# Patient Record
Sex: FEMALE | Race: WHITE | Employment: OTHER | URBAN - METROPOLITAN AREA
[De-identification: names, ages, dates, MRNs, and addresses within clinical notes are randomized per-mention and may not be internally consistent; named-entity substitution may affect disease eponyms.]

---

## 2017-01-03 DIAGNOSIS — Z79.899 ENCOUNTER FOR LONG-TERM (CURRENT) USE OF HIGH-RISK MEDICATION: ICD-10-CM

## 2017-01-03 LAB
BASOPHILS # BLD AUTO: 0 10E9/L (ref 0–0.2)
BASOPHILS NFR BLD AUTO: 0.3 %
DIFFERENTIAL METHOD BLD: ABNORMAL
EOSINOPHIL # BLD AUTO: 0.2 10E9/L (ref 0–0.7)
EOSINOPHIL NFR BLD AUTO: 1.2 %
ERYTHROCYTE [DISTWIDTH] IN BLOOD BY AUTOMATED COUNT: 14.1 % (ref 10–15)
HCT VFR BLD AUTO: 37.8 % (ref 35–47)
HGB BLD-MCNC: 12.4 G/DL (ref 11.7–15.7)
LYMPHOCYTES # BLD AUTO: 2.1 10E9/L (ref 0.8–5.3)
LYMPHOCYTES NFR BLD AUTO: 17 %
MCH RBC QN AUTO: 32.7 PG (ref 26.5–33)
MCHC RBC AUTO-ENTMCNC: 32.8 G/DL (ref 31.5–36.5)
MCV RBC AUTO: 100 FL (ref 78–100)
MONOCYTES # BLD AUTO: 0.8 10E9/L (ref 0–1.3)
MONOCYTES NFR BLD AUTO: 6.4 %
NEUTROPHILS # BLD AUTO: 9.3 10E9/L (ref 1.6–8.3)
NEUTROPHILS NFR BLD AUTO: 75.1 %
PLATELET # BLD AUTO: 412 10E9/L (ref 150–450)
RBC # BLD AUTO: 3.79 10E12/L (ref 3.8–5.2)
WBC # BLD AUTO: 12.3 10E9/L (ref 4–11)

## 2017-01-03 PROCEDURE — 80159 DRUG ASSAY CLOZAPINE: CPT | Mod: 90 | Performed by: FAMILY MEDICINE

## 2017-01-03 PROCEDURE — 36415 COLL VENOUS BLD VENIPUNCTURE: CPT | Performed by: FAMILY MEDICINE

## 2017-01-03 PROCEDURE — 85025 COMPLETE CBC W/AUTO DIFF WBC: CPT | Performed by: FAMILY MEDICINE

## 2017-01-04 DIAGNOSIS — E61.1 IRON DEFICIENCY: Primary | ICD-10-CM

## 2017-01-04 RX ORDER — FERROUS SULFATE 325(65) MG
1 TABLET ORAL 2 TIMES DAILY
Qty: 60 TABLET | Refills: 11 | Status: SHIPPED | OUTPATIENT
Start: 2017-01-04 | End: 2017-11-22

## 2017-01-05 LAB
CLOZAPINE SERPL-MCNC: 769 NG/ML
CLOZAPINE+NOR SERPL-MCNC: 1224 UG/ML
NORCLOZAPINE SERPL-MCNC: 455 NG/ML

## 2017-01-16 ENCOUNTER — HOSPITAL ENCOUNTER (OUTPATIENT)
Dept: RESPIRATORY THERAPY | Facility: CLINIC | Age: 47
Discharge: HOME | End: 2017-01-16
Attending: NURSE PRACTITIONER | Admitting: NURSE PRACTITIONER
Payer: MEDICARE

## 2017-01-16 DIAGNOSIS — J45.20 ASTHMA, INTERMITTENT, UNCOMPLICATED: Chronic | ICD-10-CM

## 2017-01-16 PROCEDURE — 94726 PLETHYSMOGRAPHY LUNG VOLUMES: CPT

## 2017-01-16 PROCEDURE — 40000275 ZZH STATISTIC RCP TIME EA 10 MIN

## 2017-01-16 PROCEDURE — 94726 PLETHYSMOGRAPHY LUNG VOLUMES: CPT | Mod: 26 | Performed by: INTERNAL MEDICINE

## 2017-01-16 PROCEDURE — 94060 EVALUATION OF WHEEZING: CPT

## 2017-01-16 PROCEDURE — 94060 EVALUATION OF WHEEZING: CPT | Mod: 26 | Performed by: INTERNAL MEDICINE

## 2017-01-16 NOTE — LETTER
[facility] RESPIRATORY THERAPY  [address] [address]  Phone: [phone]  Fax: [phone]    January 18, 2017        Doreen Gramajo  [address] [facility] [address]          Dear Doreen,          The results of your recent Pulmonary Function tests were:  unable to be fully completed. No restriction noted.   I'm going to refer you to allergy/asthma for review of asthma. Please schedule appointment: in [place] [phone].    If you have any further questions or problems, please contact our office.      Sincerely,        Josey Bonilla CNP/ oneyda

## 2017-01-17 ENCOUNTER — ALLIED HEALTH/NURSE VISIT (OUTPATIENT)
Dept: FAMILY MEDICINE | Facility: CLINIC | Age: 47
End: 2017-01-17
Payer: MEDICARE

## 2017-01-17 DIAGNOSIS — F31.9 BIPOLAR AFFECTIVE DISORDER, REMISSION STATUS UNSPECIFIED (H): Primary | Chronic | ICD-10-CM

## 2017-01-17 DIAGNOSIS — Z79.899 ENCOUNTER FOR LONG-TERM (CURRENT) USE OF HIGH-RISK MEDICATION: ICD-10-CM

## 2017-01-17 DIAGNOSIS — F20.9 SCHIZOPHRENIA, UNSPECIFIED TYPE (H): ICD-10-CM

## 2017-01-17 LAB
BASOPHILS # BLD AUTO: 0 10E9/L (ref 0–0.2)
BASOPHILS NFR BLD AUTO: 0.3 %
DIFFERENTIAL METHOD BLD: ABNORMAL
EOSINOPHIL # BLD AUTO: 0.2 10E9/L (ref 0–0.7)
EOSINOPHIL NFR BLD AUTO: 1.3 %
ERYTHROCYTE [DISTWIDTH] IN BLOOD BY AUTOMATED COUNT: 14.4 % (ref 10–15)
HCT VFR BLD AUTO: 37.1 % (ref 35–47)
HGB BLD-MCNC: 12.1 G/DL (ref 11.7–15.7)
LYMPHOCYTES # BLD AUTO: 1.5 10E9/L (ref 0.8–5.3)
LYMPHOCYTES NFR BLD AUTO: 13.3 %
MCH RBC QN AUTO: 32.7 PG (ref 26.5–33)
MCHC RBC AUTO-ENTMCNC: 32.6 G/DL (ref 31.5–36.5)
MCV RBC AUTO: 100 FL (ref 78–100)
MONOCYTES # BLD AUTO: 0.6 10E9/L (ref 0–1.3)
MONOCYTES NFR BLD AUTO: 5.4 %
NEUTROPHILS # BLD AUTO: 9.2 10E9/L (ref 1.6–8.3)
NEUTROPHILS NFR BLD AUTO: 79.7 %
PLATELET # BLD AUTO: 415 10E9/L (ref 150–450)
RBC # BLD AUTO: 3.7 10E12/L (ref 3.8–5.2)
WBC # BLD AUTO: 11.6 10E9/L (ref 4–11)

## 2017-01-17 PROCEDURE — 96372 THER/PROPH/DIAG INJ SC/IM: CPT

## 2017-01-17 PROCEDURE — 99207 ZZC NO CHARGE NURSE ONLY: CPT

## 2017-01-17 PROCEDURE — 36415 COLL VENOUS BLD VENIPUNCTURE: CPT | Performed by: FAMILY MEDICINE

## 2017-01-17 PROCEDURE — 85025 COMPLETE CBC W/AUTO DIFF WBC: CPT | Performed by: FAMILY MEDICINE

## 2017-01-17 PROCEDURE — 80159 DRUG ASSAY CLOZAPINE: CPT | Mod: 90 | Performed by: FAMILY MEDICINE

## 2017-01-18 DIAGNOSIS — R41.83 BORDERLINE INTELLECTUAL FUNCTIONING: ICD-10-CM

## 2017-01-18 DIAGNOSIS — F31.9 BIPOLAR AFFECTIVE DISORDER, REMISSION STATUS UNSPECIFIED (H): Chronic | ICD-10-CM

## 2017-01-18 DIAGNOSIS — F20.9 SCHIZOPHRENIA, UNSPECIFIED TYPE (H): Chronic | ICD-10-CM

## 2017-01-18 DIAGNOSIS — F17.219 CIGARETTE NICOTINE DEPENDENCE WITH NICOTINE-INDUCED DISORDER: ICD-10-CM

## 2017-01-18 DIAGNOSIS — Z78.9 LIVES IN GROUP HOME: ICD-10-CM

## 2017-01-18 DIAGNOSIS — J45.20 ASTHMA, INTERMITTENT, UNCOMPLICATED: Primary | Chronic | ICD-10-CM

## 2017-01-18 LAB
CLOZAPINE SERPL-MCNC: 563 NG/ML
CLOZAPINE+NOR SERPL-MCNC: 858 UG/ML
ERV-%PRED-PRE: 111 %
ERV-PRE: 0.83 L
ERV-PRED: 0.75 L
EXPTIME-PRE: 9.48 SEC
FEF2575-%PRED-POST: 139 %
FEF2575-%PRED-PRE: 117 %
FEF2575-POST: 3.86 L/SEC
FEF2575-PRE: 3.25 L/SEC
FEF2575-PRED: 2.78 L/SEC
FEFMAX-%PRED-PRE: 87 %
FEFMAX-PRE: 5.7 L/SEC
FEFMAX-PRED: 6.54 L/SEC
FEV1-%PRED-PRE: 103 %
FEV1-PRE: 2.77 L
FEV1FEV6-PRE: 89 %
FEV1FEV6-PRED: 83 %
FEV1FVC-PRE: 89 %
FEV1FVC-PRED: 81 %
FEV1SVC-PRE: 83 %
FEV1SVC-PRED: 81 %
FIFMAX-PRE: 2.67 L/SEC
FRCPLETH-%PRED-PRE: 89 %
FRCPLETH-PRE: 2.3 L
FRCPLETH-PRED: 2.57 L
FVC-%PRED-PRE: 94 %
FVC-PRE: 3.13 L
FVC-PRED: 3.3 L
IC-%PRED-PRE: 94 %
IC-PRE: 2.42 L
IC-PRED: 2.56 L
NORCLOZAPINE SERPL-MCNC: 295 NG/ML
RVPLETH-%PRED-PRE: 87 %
RVPLETH-PRE: 1.39 L
RVPLETH-PRED: 1.59 L
TLCPLETH-%PRED-PRE: 102 %
TLCPLETH-PRE: 4.72 L
TLCPLETH-PRED: 4.6 L
VC-%PRED-PRE: 100 %
VC-PRE: 3.32 L
VC-PRED: 3.31 L

## 2017-01-31 ENCOUNTER — OFFICE VISIT (OUTPATIENT)
Dept: ALLERGY | Facility: CLINIC | Age: 47
End: 2017-01-31
Payer: MEDICARE

## 2017-01-31 VITALS
HEART RATE: 97 BPM | HEIGHT: 62 IN | WEIGHT: 165 LBS | OXYGEN SATURATION: 98 % | TEMPERATURE: 98.7 F | BODY MASS INDEX: 30.36 KG/M2 | DIASTOLIC BLOOD PRESSURE: 71 MMHG | SYSTOLIC BLOOD PRESSURE: 97 MMHG

## 2017-01-31 DIAGNOSIS — R09.81 NASAL CONGESTION: ICD-10-CM

## 2017-01-31 DIAGNOSIS — J45.20 ASTHMA, INTERMITTENT, UNCOMPLICATED: Primary | Chronic | ICD-10-CM

## 2017-01-31 DIAGNOSIS — Z79.899 ENCOUNTER FOR LONG-TERM (CURRENT) USE OF HIGH-RISK MEDICATION: ICD-10-CM

## 2017-01-31 LAB
BASOPHILS # BLD AUTO: 0 10E9/L (ref 0–0.2)
BASOPHILS NFR BLD AUTO: 0.4 %
DIFFERENTIAL METHOD BLD: ABNORMAL
EOSINOPHIL # BLD AUTO: 0.1 10E9/L (ref 0–0.7)
EOSINOPHIL NFR BLD AUTO: 1.2 %
ERYTHROCYTE [DISTWIDTH] IN BLOOD BY AUTOMATED COUNT: 14.1 % (ref 10–15)
HCT VFR BLD AUTO: 39.5 % (ref 35–47)
HGB BLD-MCNC: 12.8 G/DL (ref 11.7–15.7)
LYMPHOCYTES # BLD AUTO: 2 10E9/L (ref 0.8–5.3)
LYMPHOCYTES NFR BLD AUTO: 17.9 %
MCH RBC QN AUTO: 32.7 PG (ref 26.5–33)
MCHC RBC AUTO-ENTMCNC: 32.4 G/DL (ref 31.5–36.5)
MCV RBC AUTO: 101 FL (ref 78–100)
MONOCYTES # BLD AUTO: 0.7 10E9/L (ref 0–1.3)
MONOCYTES NFR BLD AUTO: 5.8 %
NEUTROPHILS # BLD AUTO: 8.4 10E9/L (ref 1.6–8.3)
NEUTROPHILS NFR BLD AUTO: 74.7 %
PLATELET # BLD AUTO: 411 10E9/L (ref 150–450)
RBC # BLD AUTO: 3.92 10E12/L (ref 3.8–5.2)
WBC # BLD AUTO: 11.2 10E9/L (ref 4–11)

## 2017-01-31 PROCEDURE — 85025 COMPLETE CBC W/AUTO DIFF WBC: CPT

## 2017-01-31 PROCEDURE — 99203 OFFICE O/P NEW LOW 30 MIN: CPT | Performed by: ALLERGY & IMMUNOLOGY

## 2017-01-31 PROCEDURE — 80159 DRUG ASSAY CLOZAPINE: CPT | Mod: 90

## 2017-01-31 PROCEDURE — 36415 COLL VENOUS BLD VENIPUNCTURE: CPT

## 2017-01-31 RX ORDER — ALBUTEROL SULFATE 90 UG/1
2 AEROSOL, METERED RESPIRATORY (INHALATION) EVERY 4 HOURS PRN
Qty: 1 INHALER | Refills: 3 | Status: SHIPPED | OUTPATIENT
Start: 2017-01-31 | End: 2017-03-30 | Stop reason: ALTCHOICE

## 2017-01-31 ASSESSMENT — ENCOUNTER SYMPTOMS
CHEST TIGHTNESS: 1
NAUSEA: 1
WHEEZING: 0
COUGH: 1
DIARRHEA: 0
LIGHT-HEADEDNESS: 0
VOMITING: 0
SHORTNESS OF BREATH: 1
ARTHRALGIAS: 0
EYE DISCHARGE: 0
RHINORRHEA: 1
EYE REDNESS: 0
FACIAL SWELLING: 0
SINUS PRESSURE: 0
FATIGUE: 1
ACTIVITY CHANGE: 0
CHILLS: 0
EYE ITCHING: 0
MYALGIAS: 0
FEVER: 0
ADENOPATHY: 0

## 2017-01-31 NOTE — Clinical Note
Dear Ms. Bonilla  The patient was seen in Allergy Clinic for consultation.   Please see the office visit note for more details. Thank you for the referral!!!

## 2017-01-31 NOTE — Clinical Note
My Asthma Action Plan  Name: Doreen Gramajo   YOB: 1970  Date: 1/31/2017   My doctor: Josey Bonilla   My clinic: [facility]        My Rescue Medicine: Albuterol (Proair/Ventolin/Proventil) HFA        Dose: 2-4 puffs every 4 hrs as needed for persistent cough/wheezing/chest tightness and shortness of breath   My Asthma Severity: intermittent  Avoid your asthma triggers: smoke, upper respiratory infections and exercise or sports        GREEN ZONE   Good Control    I feel good    No cough or wheeze    Can work, sleep and play without asthma symptoms       Take your asthma control medicine every day.     1. If exercise triggers your asthma, take your rescue medication    15 minutes before exercise or sports, and    During exercise if you have asthma symptoms  2. Spacer to use with inhaler: If you have a spacer, make sure to use it with your inhaler             YELLOW ZONE Getting Worse  I have ANY of these:    I do not feel good    Cough or wheeze    Chest feels tight    Wake up at night   1. Keep taking your Green Zone medications  2. Start taking your rescue medicine:    every 20 minutes for up to 1 hour. Then every 4 hours for 24-48 hours.  3. If you stay in the Yellow Zone for more than 12-24 hours, contact your doctor.  4. If you do not return to the Green Zone in 12-24 hours or you get worse, call your doctor           RED ZONE Medical Alert - Get Help  I have ANY of these:    I feel awful    Medicine is not helping    Breathing getting harder    Trouble walking or talking    Nose opens wide to breathe       1. Take your rescue medicine NOW  2. If your provider has prescribed an oral steroid medicine, start taking it NOW  3. Call your doctor NOW  4. If you are still in the Red Zone after 20 minutes and you have not reached your doctor:    Take your rescue medicine again and    Call 911 or go to the emergency room right away    See your regular doctor within 2 weeks of an  Emergency Room or Urgent Care visit for follow-up treatment.        The above medication may be given at school or day care?: N/A (Adult Patient)  Child can carry and use inhaler(s) at school with approval of school nurse?: N/A (Adult Patient)    Electronically signed by: Dany Brito, January 31, 2017    Annual Reminders:  Meet with Asthma Educator,  Flu Shot in the Fall, consider Pneumonia Vaccination for patients with asthma (aged 19 and older).    Pharmacy:    [facility] - [place], [place], [place] - [address] [facility] - [place], [place] - [address] [place] [address] [facility] - [place], [place] - [address] [facility]                    Asthma Triggers  How To Control Things That Make Your Asthma Worse    Triggers are things that make your asthma worse.  Look at the list below to help you find your triggers and what you can do about them.  You can help prevent asthma flare-ups by staying away from your triggers.      Trigger                                                          What you can do   Cigarette Smoke  Tobacco smoke can make asthma worse. Do not allow smoking in your home, car or around you.  Be sure no one smokes at a child s day care or school.  If you smoke, ask your health care provider for ways to help you quit.  Ask family members to quit too.  Ask your health care provider for a referral to Quit Plan to help you quit smoking, or call 3-440-818-PLAN.     Colds, Flu, Bronchitis  These are common triggers of asthma. Wash your hands often.  Don t touch your eyes, nose or mouth.  Get a flu shot every year.     Dust Mites  These are tiny bugs that live in cloth or carpet. They are too small to see. Wash sheets and blankets in hot water every week.   Encase pillows and mattress in dust mite proof covers.  Avoid having carpet if you can. If you have carpet, vacuum weekly.   Use a dust mask and HEPA vacuum.   Pollen and Outdoor Mold  Some people are  allergic to trees, grass, or weed pollen, or molds. Try to keep your windows closed.  Limit time out doors when pollen count is high.   Ask you health care provider about taking medicine during allergy season.     Animal Dander  Some people are allergic to skin flakes, urine or saliva from pets with fur or feathers. Keep pets with fur or feathers out of your home.    If you can t keep the pet outdoors, then keep the pet out of your bedroom.  Keep the bedroom door closed.  Keep pets off cloth furniture and away from stuffed toys.     Mice, Rats, and Cockroaches  Some people are allergic to the waste from these pests.   Cover food and garbage.  Clean up spills and food crumbs.  Store grease in the refrigerator.   Keep food out of the bedroom.   Indoor Mold  This can be a trigger if your home has high moisture. Fix leaking faucets, pipes, or other sources of water.   Clean moldy surfaces.  Dehumidify basement if it is damp and smelly.   Smoke, Strong Odors, and Sprays  These can reduce air quality. Stay away from strong odors and sprays, such as perfume, powder, hair spray, paints, smoke incense, paint, cleaning products, candles and new carpet.   Exercise or Sports  Some people with asthma have this trigger. Be active!  Ask your doctor about taking medicine before sports or exercise to prevent symptoms.    Warm up for 5-10 minutes before and after sports or exercise.     Other Triggers of Asthma  Cold air:  Cover your nose and mouth with a scarf.  Sometimes laughing or crying can be a trigger.  Some medicines and food can trigger asthma.

## 2017-01-31 NOTE — PROGRESS NOTES
SUBJECTIVE:                                                               Doreen Gramajo presents today to our Allergy Clinic at [facility], she is being seen in consultation at the request of Josey Bonilla.  As you know, she is a 46 year old female with history of asthma.  The patient is accompanied by staff from [facility].     She has a complicated medical history, including mental disorder, hypothyroidism, GERD, history of eating disorder, etc.    The patient states she has been diagnosed with asthma few years ago. She develops chest tightness. She has a problem taking an air in, but no problems getting the air out.  Wheezes seldomly. Associated with chest tightness as well. Albuterol is helpful within 2-3 minutes. Smoking, URI, and exertion can trigger her symptoms.    She doesn't think she has a h/o hospitalizations for asthma. No prednisone for the last year. It seems that she wasn't prescribed albuterol for her asthma.    PFTs performed in January 2017 were within normal limits, without significant response after bronchodilator. They were unable to complete DLCO.    On occasions she may develop rhinorrhea, sneezing, and postnasal drip.  She can't say what triggers it or how long it lasts.      Patient Active Problem List   Diagnosis     Schizophrenia (H)     Hypothyroidism     Bipolar affective (H)     Asthma, intermittent     Gastroesophageal reflux disease without esophagitis     CARDIOVASCULAR SCREENING; LDL GOAL LESS THAN 160     Health Care Home     Psychosis     Behavior disturbance     Cigarette nicotine dependence with nicotine-induced disorder     Iron deficiency     Chronic constipation     Urinary incontinence, unspecified type     Borderline intellectual functioning     History of anorexia nervosa     Encounter for general psychiatric examination, requested by authority     Rectal prolapse     Extrapyramidal symptom     History of eating disorder       Past  Medical History   Diagnosis Date     Constipation      Schizophrenia (H)      Schizophrenia     Hypothyroid      Asthma, intermittent      GERD (gastroesophageal reflux disease)      Depressive disorder      Anxiety      Hemorrhoids, external without complications 2/2013      Problem (# of Occurrences) Relation (Name,Age of Onset)    Asthma (1) Father    CANCER (1) Maternal Aunt: unknown cancer    Depression (2) Father, Mother       Negative family history of: Anesthesia Reaction, Colon Polyps, Colon Cancer, Crohn Disease, Ulcerative Colitis        Past Surgical History   Procedure Laterality Date     No history of surgery       Gi surgery       prolapsed rectum     Davinci rectopexy  4/4/2013     Procedure: DAVINCI RECTOPEXY;  davinci assisted ventral rectopexy,colonoscopy,and Anesthesia general with block;  Surgeon: Stephen Singh MD;  Location:  OR     Social History     Social History     Marital Status: Single     Spouse Name: N/A     Number of Children: N/A     Years of Education: N/A     Social History Main Topics     Smoking status: Current Every Day Smoker -- 1.00 packs/day     Types: Cigarettes     Smokeless tobacco: Former User     Quit date: 02/17/2014     Alcohol Use: No     Drug Use: No     Sexual Activity: No     Other Topics Concern      Service Yes     [employer]  [id number]     Blood Transfusions No     Caffeine Concern No     Occupational Exposure No     Hobby Hazards No     Sleep Concern No     Stress Concern No     Weight Concern No     Special Diet No     Back Care No     Exercise No     Bike Helmet No     Seat Belt Yes     Self-Exams No     Social History Narrative    ENVIRONMENTAL HISTORY: [place] The home is heated with a forced air and gas furnace. They does have central air conditioning. The patient's bedroom is furnished with stuffed animals in bed, carpeting in bedroom and fabric window coverings.  Pets inside the house include 1  cat(s). There is not history of cockroach or mice infestation. There is/are 1 smokers in the house.  The house does not have a damp basement.                    Review of Systems   Constitutional: Positive for fatigue. Negative for fever, chills and activity change.   HENT: Positive for postnasal drip, rhinorrhea and sneezing. Negative for congestion, ear discharge, facial swelling, nosebleeds and sinus pressure.    Eyes: Negative for discharge, redness and itching.   Respiratory: Positive for cough, chest tightness and shortness of breath. Negative for wheezing.    Cardiovascular: Negative for chest pain.   Gastrointestinal: Positive for nausea. Negative for vomiting and diarrhea.   Musculoskeletal: Negative for myalgias and arthralgias.   Skin: Negative for rash.   Neurological: Negative for light-headedness.   Hematological: Negative for adenopathy.   Psychiatric/Behavioral: Positive for behavioral problems.           Current outpatient prescriptions:      ferrous sulfate (IRON) 325 (65 FE) MG tablet, Take 1 tablet (325 mg) by mouth 2 times daily, Disp: 60 tablet, Rfl: 11     vitamin B complex with vitamin C (VITAMIN  B COMPLEX) TABS tablet, Take 1 tablet by mouth daily, Disp: 30 tablet, Rfl: 11     multivitamin, therapeutic with minerals (CERTAVITE/ANTIOXIDANTS) TABS tablet, Take 1 tablet by mouth daily, Disp: 90 each, Rfl: 3     polyethylene glycol (MIRALAX) powder, Take 17 g (1 capful) by mouth daily, Disp: 510 g, Rfl: 3     albuterol (PROAIR HFA, PROVENTIL HFA, VENTOLIN HFA) 108 (90 BASE) MCG/ACT inhaler, Inhale 2 puffs into the lungs every 6 hours as needed for shortness of breath / dyspnea or wheezing, Disp: 3 Inhaler, Rfl: 1     senna (SENOKOT) 8.6 MG tablet, Take 3 tablets by mouth 2 times daily, Disp: 180 tablet, Rfl: 9     order for DME, Equipment being ordered: Incontinent supplies for 1 year, Disp: 1 Month, Rfl: 11     pantoprazole (PROTONIX) 20 MG tablet, Take 1 tablet (20 mg) by mouth daily (Reduced  dosage to 20 mg), Disp: 30 tablet, Rfl: 11     HALOPERIDOL PO, Take 10 mg by mouth At Bedtime, Disp: , Rfl:      levothyroxine (SYNTHROID, LEVOTHROID) 50 MCG tablet, Take 1 tablet (50 mcg) by mouth daily, Disp: 30 tablet, Rfl: 11     lithium 300 MG tablet, Take 300 mg by mouth 2 times daily, Disp: , Rfl:      CLOZAPINE PO (CLOZARIL), , Disp: , Rfl:      haloperidol decanoate (HALDOL DECANOATE) 100 MG/ML injection, Inject 100 mg into the muscle every 21 days Order scanned into EPIC, Order from Mary Pollack NP ([facility]) ph:[phone] Received on 5/31/2016. Refills 6., Disp: 1 mL, Rfl: 6     Incontinence Supply Disposable (DEPEND OVERNIGHT BRIEFS MEDIUM) MISC, 1 each daily as needed, Disp: 120 each, Rfl: 5     Escitalopram Oxalate (LEXAPRO PO), Take 20 mg by mouth daily , Disp: , Rfl:      TRAZODONE HCL PO, Take 200 mg by mouth At Bedtime, Disp: , Rfl:      atropine 0.1 mg/mL, Take by mouth At Bedtime, Disp: , Rfl:      HALOPERIDOL PO, Take 5 mg by mouth every 3 hours as needed for agitation (up to three daily) , Disp: , Rfl:      diphenhydrAMINE (BENADRYL) 50 MG capsule, Take 1 capsule (50 mg) by mouth every 6 hours as needed (anxiety or restlessness) (Patient taking differently: Take 50 mg by mouth 3 times daily ), Disp: 56 capsule, Rfl: 0     LORazepam (ATIVAN) 1 MG tablet, Take 1 tablet by mouth every 6 hours as needed. As needed for anxiety, none after 9pm. (Patient taking differently: Take 1 mg by mouth every 3 hours as needed As needed for anxiety, none after 9pm.), Disp: 60 tablet, Rfl: 0     nystatin (MYCOSTATIN) 154914 UNIT/ML suspension, Take 5 mLs (500,000 Units) by mouth 4 times daily, Disp: 60 mL, Rfl: 0     Miconazole Nitrate 2 % OINT, Apply twice daily to the corners of the mouth for 7-14 days, Disp: 30 g, Rfl: 0     simethicone (MYLICON) 80 MG chewable tablet, Take 1 tablet (80 mg) by mouth every 6 hours as needed for flatulence or cramping PRN, Disp: 180 tablet, Rfl:  "3  Immunization History   Administered Date(s) Administered     DTAP (<7y) 07/23/1991, 02/26/1992, 10/25/1993, 10/02/1995, 08/31/1996     Hepatitis B 08/20/2003, 11/07/2003, 05/11/2004     Influenza (IIV3) 10/27/2010     Influenza Vaccine IM 3yrs+ 4 Valent IIV4 11/22/2016     MMR 01/25/1993, 08/20/2003     Meningococcal (Menomune ) 09/26/2005     OPV 07/23/1991, 02/26/1992, 10/25/1993, 08/31/1996     TD (ADULT, 7+) 08/20/2003     TDAP (ADACEL AGES 11-64) 02/11/2014     Allergies   Allergen Reactions     Clozaril [Clozapine] Other (See Comments)     Neutropenia (9/2013: okay to rechallenge)     Milk Products GI Disturbance     Nicotine Rash     Nicotine patch developed a local rash     No Clinical Screening - See Comments Rash     Pt gets a rash from the nicotine patch-adhesive on the patch     OBJECTIVE:                                                                 BP 97/71 mmHg  Pulse 97  Temp(Src) 98.7  F (37.1  C)  Ht 5' 2\" (1.575 m)  Wt 165 lb (74.844 kg)  BMI 30.17 kg/m2  SpO2 98%        Physical Exam   Constitutional: No distress.   HENT:   Head: Normocephalic and atraumatic.   Right Ear: Tympanic membrane, external ear and ear canal normal.   Left Ear: Tympanic membrane, external ear and ear canal normal.   Nose: No mucosal edema or rhinorrhea.   Mouth/Throat: Oropharynx is clear and moist and mucous membranes are normal.   Eyes: Conjunctivae are normal. Right eye exhibits no discharge. Left eye exhibits no discharge.   Neck: Normal range of motion.   Cardiovascular: Normal rate, regular rhythm and normal heart sounds.    No murmur heard.  Pulmonary/Chest: Effort normal and breath sounds normal. No respiratory distress. She has no wheezes. She has no rales.   Musculoskeletal: Normal range of motion.   Lymphadenopathy:     She has no cervical adenopathy.   Neurological: She is alert.   Skin: Skin is warm. No rash noted. She is not diaphoretic.   Psychiatric:   Flat affect   Nursing note and vitals " reviewed.      ASSESSMENT/PLAN:      Problem List Items Addressed This Visit        Respiratory    1. Asthma, intermittent - Primary (Chronic)  Historical diagnosis. Symptoms of chest tightness improved with albuterol clinically consistent with asthma. One flag is that she reports inability to get the air in and no problems getting the air out, which is usually reversed and asthma patients; however, it could be just her perception.  She had normal spirometry without reversibility after nebulized albuterol in PFT lab. Was unable to perform DLCO.  -Continue using albuterol inhaler 2-4 puffs every 4-6 hours as needed for chest tightness/wheezing/shortness of breath/persistent cough.  -Use inhaler with chamber device (provided).          Relevant Orders    Allergen cat epithellium IgE    Allergen dog epithelium IgE    Allergen Tez grass IgE    Allergen orchard grass IgE    Allergen aleks IgE    Allergen D pteronyssinus IgE    Allergen D farinae IgE    Allergen alternaria alternata IgE    Allergen aspergillus fumigatus IgE    Allergen cladosporium herbarum IgE    Allergen penicillium notatum IgE    Allergen Epicoccum purpurascens IgE    Allergen oak white IgE    Allergen Red Ringgold IgE    Allergen silver  birch IgE    Allergen Byron Tree    Allergen white pine IgE    Allergen Ringgold White    Allergen maple box elder IgE    Allergen elm IgE    Allergen cottonwood IgE    Allergen Cedar IgE    Allergen irina white IgE    Allergen English plantain IgE    Allergen giant ragweed IgE    Allergen lamb's quarter IgE    Allergen Mugwort IgE    Allergen ragweed short IgE    Allergen, Kochia/Firebush    Allergen Weed Nettle IgE    Allergen thistle Russian IgE    Allergen Sheep Sorrel IgE    Allergen Sagebrush Wormwood IgE      Other Visit Diagnoses     2. Nasal congestion      Treatment will depend on serum IgE for regional aeroallergen panel and frequency of symptoms. Currently, the patient states that her symptoms are not  frequent and not very severe.        Follow up in 6 months or sooner if needed.      Thank you for allowing us to participate in the care of this patient. Please feel free to contact us if there are any questions or concerns about the patient.    Disclaimer: This note consists of symbols derived from keyboarding, dictation and/or voice recognition software. As a result, there may be errors in the script that have gone undetected. Please consider this when interpreting information found in this chart.    Dany Brito MD   Allergy, Asthma and Immunology  [place], [place] and [place] [place]

## 2017-01-31 NOTE — NURSING NOTE
"Chief Complaint   Patient presents with     Allergy Consult     Refered by Debby Bonilla for asthma       Initial BP 97/71 mmHg  Pulse 97  Temp(Src) 98.7  F (37.1  C)  Ht 5' 2\" (1.575 m)  Wt 165 lb (74.844 kg)  BMI 30.17 kg/m2  SpO2 98% Estimated body mass index is 30.17 kg/(m^2) as calculated from the following:    Height as of this encounter: 5' 2\" (1.575 m).    Weight as of this encounter: 165 lb (74.844 kg).  BP completed using cuff size: regular      "

## 2017-02-01 ASSESSMENT — ASTHMA QUESTIONNAIRES: ACT_TOTALSCORE: 22

## 2017-02-02 LAB
CLOZAPINE SERPL-MCNC: 609 NG/ML
CLOZAPINE+NOR SERPL-MCNC: 955 UG/ML
NORCLOZAPINE SERPL-MCNC: 346 NG/ML

## 2017-02-07 ENCOUNTER — ALLIED HEALTH/NURSE VISIT (OUTPATIENT)
Dept: FAMILY MEDICINE | Facility: CLINIC | Age: 47
End: 2017-02-07
Payer: MEDICARE

## 2017-02-07 DIAGNOSIS — F20.9 SCHIZOPHRENIA, UNSPECIFIED TYPE (H): ICD-10-CM

## 2017-02-07 DIAGNOSIS — R46.89 BEHAVIOR CONCERN: ICD-10-CM

## 2017-02-07 PROCEDURE — 99207 ZZC NO CHARGE NURSE ONLY: CPT

## 2017-02-07 PROCEDURE — 96372 THER/PROPH/DIAG INJ SC/IM: CPT

## 2017-02-14 DIAGNOSIS — Z79.899 ENCOUNTER FOR LONG-TERM (CURRENT) USE OF HIGH-RISK MEDICATION: ICD-10-CM

## 2017-02-14 LAB
BASOPHILS # BLD AUTO: 0 10E9/L (ref 0–0.2)
BASOPHILS NFR BLD AUTO: 0.5 %
DIFFERENTIAL METHOD BLD: ABNORMAL
EOSINOPHIL # BLD AUTO: 0.2 10E9/L (ref 0–0.7)
EOSINOPHIL NFR BLD AUTO: 2.3 %
ERYTHROCYTE [DISTWIDTH] IN BLOOD BY AUTOMATED COUNT: 13.9 % (ref 10–15)
HCT VFR BLD AUTO: 38.4 % (ref 35–47)
HGB BLD-MCNC: 12.4 G/DL (ref 11.7–15.7)
LYMPHOCYTES # BLD AUTO: 1.6 10E9/L (ref 0.8–5.3)
LYMPHOCYTES NFR BLD AUTO: 21.7 %
MCH RBC QN AUTO: 32.6 PG (ref 26.5–33)
MCHC RBC AUTO-ENTMCNC: 32.3 G/DL (ref 31.5–36.5)
MCV RBC AUTO: 101 FL (ref 78–100)
MONOCYTES # BLD AUTO: 0.5 10E9/L (ref 0–1.3)
MONOCYTES NFR BLD AUTO: 6.4 %
NEUTROPHILS # BLD AUTO: 5.1 10E9/L (ref 1.6–8.3)
NEUTROPHILS NFR BLD AUTO: 69.1 %
PLATELET # BLD AUTO: 398 10E9/L (ref 150–450)
RBC # BLD AUTO: 3.8 10E12/L (ref 3.8–5.2)
WBC # BLD AUTO: 7.3 10E9/L (ref 4–11)

## 2017-02-14 PROCEDURE — 80159 DRUG ASSAY CLOZAPINE: CPT | Mod: 90 | Performed by: FAMILY MEDICINE

## 2017-02-14 PROCEDURE — 85025 COMPLETE CBC W/AUTO DIFF WBC: CPT | Performed by: FAMILY MEDICINE

## 2017-02-14 PROCEDURE — 36415 COLL VENOUS BLD VENIPUNCTURE: CPT | Performed by: FAMILY MEDICINE

## 2017-02-17 LAB
CLOZAPINE SERPL-MCNC: 582 NG/ML
CLOZAPINE+NOR SERPL-MCNC: 923 UG/ML
NORCLOZAPINE SERPL-MCNC: 341 NG/ML

## 2017-02-28 ENCOUNTER — MEDICAL CORRESPONDENCE (OUTPATIENT)
Dept: HEALTH INFORMATION MANAGEMENT | Facility: CLINIC | Age: 47
End: 2017-02-28

## 2017-02-28 ENCOUNTER — ALLIED HEALTH/NURSE VISIT (OUTPATIENT)
Dept: FAMILY MEDICINE | Facility: CLINIC | Age: 47
End: 2017-02-28
Payer: MEDICARE

## 2017-02-28 DIAGNOSIS — R09.81 NASAL CONGESTION: ICD-10-CM

## 2017-02-28 DIAGNOSIS — J45.20 ASTHMA, INTERMITTENT, UNCOMPLICATED: Chronic | ICD-10-CM

## 2017-02-28 DIAGNOSIS — Z79.899 ENCOUNTER FOR LONG-TERM (CURRENT) USE OF MEDICATIONS: ICD-10-CM

## 2017-02-28 DIAGNOSIS — F20.0 PARANOID SCHIZOPHRENIA, SUBCHRONIC CONDITION (H): Primary | ICD-10-CM

## 2017-02-28 DIAGNOSIS — F20.9 SCHIZOPHRENIA, UNSPECIFIED TYPE (H): ICD-10-CM

## 2017-02-28 DIAGNOSIS — Z79.899 ENCOUNTER FOR LONG-TERM (CURRENT) USE OF HIGH-RISK MEDICATION: ICD-10-CM

## 2017-02-28 LAB
BASOPHILS # BLD AUTO: 0 10E9/L (ref 0–0.2)
BASOPHILS NFR BLD AUTO: 0.2 %
DIFFERENTIAL METHOD BLD: NORMAL
EOSINOPHIL # BLD AUTO: 0.2 10E9/L (ref 0–0.7)
EOSINOPHIL NFR BLD AUTO: 1.6 %
ERYTHROCYTE [DISTWIDTH] IN BLOOD BY AUTOMATED COUNT: 14.2 % (ref 10–15)
HCT VFR BLD AUTO: 38.2 % (ref 35–47)
HGB BLD-MCNC: 12.5 G/DL (ref 11.7–15.7)
LYMPHOCYTES # BLD AUTO: 2.2 10E9/L (ref 0.8–5.3)
LYMPHOCYTES NFR BLD AUTO: 20.1 %
MCH RBC QN AUTO: 32.8 PG (ref 26.5–33)
MCHC RBC AUTO-ENTMCNC: 32.7 G/DL (ref 31.5–36.5)
MCV RBC AUTO: 100 FL (ref 78–100)
MONOCYTES # BLD AUTO: 0.9 10E9/L (ref 0–1.3)
MONOCYTES NFR BLD AUTO: 7.8 %
NEUTROPHILS # BLD AUTO: 7.8 10E9/L (ref 1.6–8.3)
NEUTROPHILS NFR BLD AUTO: 70.3 %
PLATELET # BLD AUTO: 385 10E9/L (ref 150–450)
RBC # BLD AUTO: 3.81 10E12/L (ref 3.8–5.2)
WBC # BLD AUTO: 11 10E9/L (ref 4–11)

## 2017-02-28 PROCEDURE — 86003 ALLG SPEC IGE CRUDE XTRC EA: CPT | Performed by: ALLERGY & IMMUNOLOGY

## 2017-02-28 PROCEDURE — 99207 ZZC NO CHARGE NURSE ONLY: CPT

## 2017-02-28 PROCEDURE — 85025 COMPLETE CBC W/AUTO DIFF WBC: CPT | Performed by: NURSE PRACTITIONER

## 2017-02-28 PROCEDURE — 80159 DRUG ASSAY CLOZAPINE: CPT | Mod: 90 | Performed by: NURSE PRACTITIONER

## 2017-02-28 PROCEDURE — 96372 THER/PROPH/DIAG INJ SC/IM: CPT

## 2017-02-28 PROCEDURE — 36415 COLL VENOUS BLD VENIPUNCTURE: CPT | Performed by: NURSE PRACTITIONER

## 2017-03-01 LAB
CLOZAPINE SERPL-MCNC: 557 NG/ML
CLOZAPINE+NOR SERPL-MCNC: 882 UG/ML
NORCLOZAPINE SERPL-MCNC: 325 NG/ML

## 2017-03-02 LAB
CALIF WALNUT IGE QN: NORMAL
DEPRECATED MISC ALLERGEN IGE RAST QL: NORMAL
WHITE MULBERRY IGE QN: NORMAL

## 2017-03-03 LAB
A ALTERNATA IGE QN: NORMAL KU(A)/L
A FUMIGATUS IGE QN: NORMAL KU(A)/L
C HERBARUM IGE QN: NORMAL KU(A)/L
CAT DANDER IGG QN: NORMAL KU(A)/L
CEDAR IGE QN: NORMAL KU(A)/L
COCKSFOOT IGE QN: NORMAL KU(A)/L
COMMON RAGWEED IGE QN: NORMAL KU(A)/L
COTTONWOOD IGE QN: NORMAL KU(A)/L
D FARINAE IGE QN: NORMAL KU(A)/L
D PTERONYSS IGE QN: NORMAL KU(A)/L
DOG DANDER+EPITH IGE QN: NORMAL KU(A)/L
E PURPURASCENS IGE QN: NORMAL KU(A)/L
EAST WHITE PINE IGE QN: NORMAL KU(A)/L
ENGL PLANTAIN IGE QN: NORMAL KU(A)/L
FIREBUSH IGE QN: NORMAL KU(A)/L
GIANT RAGWEED IGE QN: NORMAL KU(A)/L
GOOSEFOOT IGE QN: NORMAL KU(A)/L
JOHNSON GRASS IGE QN: NORMAL KU(A)/L
MAPLE IGE QN: NORMAL KU(A)/L
MUGWORT IGE QN: NORMAL KU(A)/L
NETTLE IGE QN: NORMAL KU(A)/L
P NOTATUM IGE QN: NORMAL KU(A)/L
RED MULBERRY IGE QN: NORMAL KU(A)/L
SALTWORT IGE QN: NORMAL KU(A)/L
SHEEP SORREL IGE QN: NORMAL KU(A)/L
SILVER BIRCH IGE QN: NORMAL KU(A)/L
TIMOTHY IGE QN: NORMAL KU(A)/L
WHITE ASH IGE QN: NORMAL KU(A)/L
WHITE ELM IGE QN: NORMAL KU(A)/L
WHITE OAK IGE QN: NORMAL KU(A)/L
WORMWOOD IGE QN: NORMAL KU(A)/L

## 2017-03-06 NOTE — PROGRESS NOTES
Negative serum IgE for regional aeroallergens panel.  It suggests lack of sensitization to environmental allergens.  Unable to recommend avoidance measures or allergy immunotherapy.  From my understanding, her symptoms in the past but not severe.  -Recommend a trial with oral antihistamines on as needed basis first, like Allegra or cetirizine.  If symptoms become persistent, start intranasal fluticasone 2 sprays in each nostril once a day for the duration of symptoms.  If symptoms are well controlled for one month, she may try discontinuing it until next time.

## 2017-03-20 ENCOUNTER — ALLIED HEALTH/NURSE VISIT (OUTPATIENT)
Dept: FAMILY MEDICINE | Facility: CLINIC | Age: 47
End: 2017-03-20
Payer: MEDICARE

## 2017-03-20 DIAGNOSIS — F31.9 BIPOLAR AFFECTIVE DISORDER, REMISSION STATUS UNSPECIFIED (H): Primary | ICD-10-CM

## 2017-03-20 DIAGNOSIS — F20.9 SCHIZOPHRENIA, UNSPECIFIED TYPE (H): ICD-10-CM

## 2017-03-20 PROCEDURE — 99207 ZZC NO CHARGE NURSE ONLY: CPT

## 2017-03-20 PROCEDURE — 96372 THER/PROPH/DIAG INJ SC/IM: CPT

## 2017-03-28 DIAGNOSIS — Z79.899 ENCOUNTER FOR LONG-TERM (CURRENT) USE OF MEDICATIONS: ICD-10-CM

## 2017-03-28 DIAGNOSIS — F20.0 PARANOID SCHIZOPHRENIA, SUBCHRONIC CONDITION (H): ICD-10-CM

## 2017-03-28 LAB
BASOPHILS # BLD AUTO: 0.1 10E9/L (ref 0–0.2)
BASOPHILS NFR BLD AUTO: 0.6 %
DIFFERENTIAL METHOD BLD: ABNORMAL
EOSINOPHIL # BLD AUTO: 0.2 10E9/L (ref 0–0.7)
EOSINOPHIL NFR BLD AUTO: 2.3 %
ERYTHROCYTE [DISTWIDTH] IN BLOOD BY AUTOMATED COUNT: 14 % (ref 10–15)
HCT VFR BLD AUTO: 38.8 % (ref 35–47)
HGB BLD-MCNC: 12.5 G/DL (ref 11.7–15.7)
LYMPHOCYTES # BLD AUTO: 1.8 10E9/L (ref 0.8–5.3)
LYMPHOCYTES NFR BLD AUTO: 21 %
MCH RBC QN AUTO: 32.6 PG (ref 26.5–33)
MCHC RBC AUTO-ENTMCNC: 32.2 G/DL (ref 31.5–36.5)
MCV RBC AUTO: 101 FL (ref 78–100)
MONOCYTES # BLD AUTO: 0.8 10E9/L (ref 0–1.3)
MONOCYTES NFR BLD AUTO: 8.9 %
NEUTROPHILS # BLD AUTO: 5.7 10E9/L (ref 1.6–8.3)
NEUTROPHILS NFR BLD AUTO: 67.2 %
PLATELET # BLD AUTO: 374 10E9/L (ref 150–450)
RBC # BLD AUTO: 3.83 10E12/L (ref 3.8–5.2)
WBC # BLD AUTO: 8.4 10E9/L (ref 4–11)

## 2017-03-28 PROCEDURE — 85025 COMPLETE CBC W/AUTO DIFF WBC: CPT | Performed by: NURSE PRACTITIONER

## 2017-03-28 PROCEDURE — 36415 COLL VENOUS BLD VENIPUNCTURE: CPT | Performed by: NURSE PRACTITIONER

## 2017-03-30 DIAGNOSIS — J45.20 ASTHMA, INTERMITTENT, UNCOMPLICATED: Chronic | ICD-10-CM

## 2017-03-30 RX ORDER — ALBUTEROL SULFATE 90 UG/1
2 AEROSOL, METERED RESPIRATORY (INHALATION) EVERY 6 HOURS
Qty: 1 INHALER | Refills: 3 | Status: SHIPPED | OUTPATIENT
Start: 2017-03-30 | End: 2018-04-16

## 2017-03-30 NOTE — PROGRESS NOTES
Note from [employer] insurance. Proventil HFA not covered. Ventolin HFA is covered. Will switch to new medication. Will be on hold at pharmacy until she needs a refill at [facility].  Adjust this in her medication record at [facility]     RENÉ Nowak

## 2017-03-31 ENCOUNTER — TELEPHONE (OUTPATIENT)
Dept: FAMILY MEDICINE | Facility: CLINIC | Age: 47
End: 2017-03-31

## 2017-03-31 NOTE — TELEPHONE ENCOUNTER
Called and spoke to Doreen herself and also Demetrice the group home owner and relayed the note below from PCP. Demetrice will put this on patient's medication record.   Josey Collins CNP   Nurse Practitioner - Family      []Hide copied text  []Hover for attribution information  Note from [employer] insurance. Proventil HFA not covered. Ventolin HFA is covered. Will switch to new medication. Will be on hold at pharmacy until she needs a refill at [facility].  Adjust this in her medication record at [facility]     RENÉ Nowak         Electronically signed by Josey Bonilla CNP at 3/30/2017  5:36 PM

## 2017-04-06 DIAGNOSIS — E03.9 HYPOTHYROIDISM, UNSPECIFIED TYPE: ICD-10-CM

## 2017-04-06 NOTE — TELEPHONE ENCOUNTER
levothyroxine (SYNTHROID, LEVOTHROID) 50 MCG tablet     Last Written Prescription Date: 6/1/16  Last Quantity: 30, # refills: 11  Last Office Visit with [facility], [facility] or [facility] prescribing provider: 12/6/16   Next 5 appointments (look out 90 days)     Apr 11, 2017  9:30 AM CDT   Nurse Only with [place] PI GREY/LPN   [facility]    [address] [facility] [address]   [phone]            May 02, 2017  9:30 AM CDT   Nurse Only with [place] PI CMA/LPN   [facility]    [address] [facility] [address]   [phone]                   TSH   Date Value Ref Range Status   05/10/2016 2.54 0.40 - 4.00 mU/L Final

## 2017-04-07 RX ORDER — LEVOTHYROXINE SODIUM 50 UG/1
50 TABLET ORAL DAILY
Qty: 30 TABLET | Refills: 0 | Status: SHIPPED | OUTPATIENT
Start: 2017-04-07 | End: 2017-06-01

## 2017-04-11 ENCOUNTER — ALLIED HEALTH/NURSE VISIT (OUTPATIENT)
Dept: FAMILY MEDICINE | Facility: CLINIC | Age: 47
End: 2017-04-11
Payer: MEDICARE

## 2017-04-11 DIAGNOSIS — F31.9 BIPOLAR AFFECTIVE DISORDER, REMISSION STATUS UNSPECIFIED (H): Chronic | ICD-10-CM

## 2017-04-11 DIAGNOSIS — F20.9 SCHIZOPHRENIA, UNSPECIFIED TYPE (H): Primary | Chronic | ICD-10-CM

## 2017-04-11 PROCEDURE — 96372 THER/PROPH/DIAG INJ SC/IM: CPT

## 2017-04-11 PROCEDURE — 99207 ZZC NO CHARGE NURSE ONLY: CPT

## 2017-04-11 RX ORDER — HALOPERIDOL DECANOATE 100 MG/ML
100 INJECTION INTRAMUSCULAR
Qty: 1 ML | Refills: 6 | COMMUNITY
Start: 2017-04-11 | End: 2017-12-05

## 2017-04-18 ENCOUNTER — TRANSFERRED RECORDS (OUTPATIENT)
Dept: HEALTH INFORMATION MANAGEMENT | Facility: CLINIC | Age: 47
End: 2017-04-18

## 2017-04-18 DIAGNOSIS — Z79.899 ENCOUNTER FOR LONG-TERM (CURRENT) USE OF MEDICATIONS: Primary | ICD-10-CM

## 2017-04-25 DIAGNOSIS — F20.0 PARANOID SCHIZOPHRENIA, SUBCHRONIC CONDITION (H): ICD-10-CM

## 2017-04-25 DIAGNOSIS — Z79.899 ENCOUNTER FOR LONG-TERM (CURRENT) USE OF MEDICATIONS: ICD-10-CM

## 2017-04-25 LAB
ANION GAP SERPL CALCULATED.3IONS-SCNC: 8 MMOL/L (ref 3–14)
BASOPHILS # BLD AUTO: 0 10E9/L (ref 0–0.2)
BASOPHILS NFR BLD AUTO: 0.2 %
BUN SERPL-MCNC: 14 MG/DL (ref 7–30)
CALCIUM SERPL-MCNC: 9.5 MG/DL (ref 8.5–10.1)
CHLORIDE SERPL-SCNC: 104 MMOL/L (ref 94–109)
CHOLEST SERPL-MCNC: 190 MG/DL
CO2 SERPL-SCNC: 25 MMOL/L (ref 20–32)
CREAT SERPL-MCNC: 0.84 MG/DL (ref 0.52–1.04)
DIFFERENTIAL METHOD BLD: NORMAL
EOSINOPHIL # BLD AUTO: 0.1 10E9/L (ref 0–0.7)
EOSINOPHIL NFR BLD AUTO: 1.6 %
ERYTHROCYTE [DISTWIDTH] IN BLOOD BY AUTOMATED COUNT: 14.2 % (ref 10–15)
GFR SERPL CREATININE-BSD FRML MDRD: 73 ML/MIN/1.7M2
GLUCOSE SERPL-MCNC: 100 MG/DL (ref 70–99)
HBA1C MFR BLD: 5.2 % (ref 4.3–6)
HCT VFR BLD AUTO: 39.2 % (ref 35–47)
HDLC SERPL-MCNC: 65 MG/DL
HGB BLD-MCNC: 13 G/DL (ref 11.7–15.7)
LDLC SERPL CALC-MCNC: 99 MG/DL
LYMPHOCYTES # BLD AUTO: 2.1 10E9/L (ref 0.8–5.3)
LYMPHOCYTES NFR BLD AUTO: 23.8 %
MCH RBC QN AUTO: 32.9 PG (ref 26.5–33)
MCHC RBC AUTO-ENTMCNC: 33.2 G/DL (ref 31.5–36.5)
MCV RBC AUTO: 99 FL (ref 78–100)
MONOCYTES # BLD AUTO: 0.7 10E9/L (ref 0–1.3)
MONOCYTES NFR BLD AUTO: 7.7 %
NEUTROPHILS # BLD AUTO: 5.9 10E9/L (ref 1.6–8.3)
NEUTROPHILS NFR BLD AUTO: 66.7 %
NONHDLC SERPL-MCNC: 125 MG/DL
PLATELET # BLD AUTO: 404 10E9/L (ref 150–450)
POTASSIUM SERPL-SCNC: 4.3 MMOL/L (ref 3.4–5.3)
RBC # BLD AUTO: 3.95 10E12/L (ref 3.8–5.2)
SODIUM SERPL-SCNC: 137 MMOL/L (ref 133–144)
TRIGL SERPL-MCNC: 128 MG/DL
TSH SERPL DL<=0.005 MIU/L-ACNC: 3.94 MU/L (ref 0.4–4)
WBC # BLD AUTO: 8.8 10E9/L (ref 4–11)

## 2017-04-25 PROCEDURE — 80048 BASIC METABOLIC PNL TOTAL CA: CPT | Performed by: NURSE PRACTITIONER

## 2017-04-25 PROCEDURE — 84443 ASSAY THYROID STIM HORMONE: CPT | Performed by: NURSE PRACTITIONER

## 2017-04-25 PROCEDURE — 83036 HEMOGLOBIN GLYCOSYLATED A1C: CPT | Performed by: NURSE PRACTITIONER

## 2017-04-25 PROCEDURE — 85025 COMPLETE CBC W/AUTO DIFF WBC: CPT | Performed by: NURSE PRACTITIONER

## 2017-04-25 PROCEDURE — 80061 LIPID PANEL: CPT | Performed by: NURSE PRACTITIONER

## 2017-04-25 PROCEDURE — 36415 COLL VENOUS BLD VENIPUNCTURE: CPT | Performed by: NURSE PRACTITIONER

## 2017-05-01 DIAGNOSIS — K59.09 CHRONIC CONSTIPATION: ICD-10-CM

## 2017-05-01 NOTE — TELEPHONE ENCOUNTER
POLYETHYLENE GLYCOL      Last Written Prescription Date: 11/29/16  Last Fill Quantity: 510g,  # refills: 3   Last Office Visit with G, [facility] or [facility] prescribing provider: 11/22/16                                         Next 5 appointments (look out 90 days)     May 02, 2017  9:30 AM CDT   Nurse [facility] with [place] PI GREY/LPN   [facility]    [address] [place] [id number]   [phone]

## 2017-05-02 ENCOUNTER — ALLIED HEALTH/NURSE VISIT (OUTPATIENT)
Dept: FAMILY MEDICINE | Facility: CLINIC | Age: 47
End: 2017-05-02
Payer: MEDICARE

## 2017-05-02 DIAGNOSIS — F20.9 SCHIZOPHRENIA, UNSPECIFIED TYPE (H): ICD-10-CM

## 2017-05-02 PROCEDURE — 96372 THER/PROPH/DIAG INJ SC/IM: CPT

## 2017-05-02 PROCEDURE — 99207 ZZC NO CHARGE NURSE ONLY: CPT

## 2017-05-02 RX ORDER — SENNOSIDES 8.6 MG
TABLET ORAL
Qty: 180 TABLET | Refills: 3 | Status: SHIPPED | OUTPATIENT
Start: 2017-05-02 | End: 2017-07-18

## 2017-05-02 RX ORDER — POLYETHYLENE GLYCOL 3350 17 G/17G
1 POWDER, FOR SOLUTION ORAL DAILY
Qty: 510 G | Refills: 11 | Status: SHIPPED | OUTPATIENT
Start: 2017-05-02 | End: 2018-05-25

## 2017-05-02 NOTE — TELEPHONE ENCOUNTER
senna (SENOKOT) 8.6 MG tablet      Last Written Prescription Date: 7/21/2016  Last Fill Quantity: 180,  # refills: 9   Last Office Visit with [facility], [facility] or [facility] prescribing provider: 12/6/2016                                         Next 5 appointments (look out 90 days)     May 23, 2017  9:30 AM CDT   Nurse Only with [place] PI CMA/LPN   [facility]    [address] [place] [phone]   [phone]            Jun 13, 2017  9:30 AM CDT   Nurse Only with [place] PI CMA/LPN   [facility]    [address] [place] [phone]   [phone]            Jul 03, 2017  9:30 AM CDT   Nurse Only with [place] PI CMA/LPN   [facility]    [address] [place] [phone]   [phone]

## 2017-05-23 ENCOUNTER — ALLIED HEALTH/NURSE VISIT (OUTPATIENT)
Dept: FAMILY MEDICINE | Facility: CLINIC | Age: 47
End: 2017-05-23
Payer: MEDICARE

## 2017-05-23 DIAGNOSIS — Z79.899 ENCOUNTER FOR LONG-TERM (CURRENT) USE OF MEDICATIONS: ICD-10-CM

## 2017-05-23 DIAGNOSIS — F20.0 PARANOID SCHIZOPHRENIA, SUBCHRONIC CONDITION (H): ICD-10-CM

## 2017-05-23 DIAGNOSIS — F20.9 SCHIZOPHRENIA, UNSPECIFIED TYPE (H): ICD-10-CM

## 2017-05-23 LAB
BASOPHILS # BLD AUTO: 0 10E9/L (ref 0–0.2)
BASOPHILS NFR BLD AUTO: 0.3 %
DIFFERENTIAL METHOD BLD: ABNORMAL
EOSINOPHIL # BLD AUTO: 0.1 10E9/L (ref 0–0.7)
EOSINOPHIL NFR BLD AUTO: 0.9 %
ERYTHROCYTE [DISTWIDTH] IN BLOOD BY AUTOMATED COUNT: 13.7 % (ref 10–15)
HCT VFR BLD AUTO: 38.1 % (ref 35–47)
HGB BLD-MCNC: 12.6 G/DL (ref 11.7–15.7)
LYMPHOCYTES # BLD AUTO: 2 10E9/L (ref 0.8–5.3)
LYMPHOCYTES NFR BLD AUTO: 16.4 %
MCH RBC QN AUTO: 33.2 PG (ref 26.5–33)
MCHC RBC AUTO-ENTMCNC: 33.1 G/DL (ref 31.5–36.5)
MCV RBC AUTO: 100 FL (ref 78–100)
MONOCYTES # BLD AUTO: 0.7 10E9/L (ref 0–1.3)
MONOCYTES NFR BLD AUTO: 5.8 %
NEUTROPHILS # BLD AUTO: 9.3 10E9/L (ref 1.6–8.3)
NEUTROPHILS NFR BLD AUTO: 76.6 %
PLATELET # BLD AUTO: 368 10E9/L (ref 150–450)
RBC # BLD AUTO: 3.8 10E12/L (ref 3.8–5.2)
WBC # BLD AUTO: 12.2 10E9/L (ref 4–11)

## 2017-05-23 PROCEDURE — 99207 ZZC NO CHARGE NURSE ONLY: CPT

## 2017-05-23 PROCEDURE — 36415 COLL VENOUS BLD VENIPUNCTURE: CPT | Performed by: NURSE PRACTITIONER

## 2017-05-23 PROCEDURE — 96372 THER/PROPH/DIAG INJ SC/IM: CPT

## 2017-05-23 PROCEDURE — 85025 COMPLETE CBC W/AUTO DIFF WBC: CPT | Performed by: NURSE PRACTITIONER

## 2017-05-26 ENCOUNTER — OFFICE VISIT (OUTPATIENT)
Dept: FAMILY MEDICINE | Facility: CLINIC | Age: 47
End: 2017-05-26
Payer: MEDICARE

## 2017-05-26 VITALS
WEIGHT: 171 LBS | TEMPERATURE: 97.7 F | DIASTOLIC BLOOD PRESSURE: 70 MMHG | RESPIRATION RATE: 20 BRPM | SYSTOLIC BLOOD PRESSURE: 118 MMHG | HEART RATE: 88 BPM | BODY MASS INDEX: 31.47 KG/M2 | HEIGHT: 62 IN

## 2017-05-26 DIAGNOSIS — K13.79 LESION OF HARD PALATE: Primary | ICD-10-CM

## 2017-05-26 PROCEDURE — 99212 OFFICE O/P EST SF 10 MIN: CPT | Performed by: NURSE PRACTITIONER

## 2017-05-26 NOTE — MR AVS SNAPSHOT
After Visit Summary   5/26/2017    Doreen Gramajo    MRN: [id number]           Patient Information     Date Of Birth          1970        Visit Information        Provider Department      5/26/2017 10:40 AM Josey Bonilla CNP [facility]        Today's Diagnoses     Lesion of hard palate    -  1      Care Instructions    Lesion to hard palate appears to be healing     Continue with antibiotic, Lidocaine topical, and Ibuprofen and Tylenol as needed    Re-start mouth rinse (salt water)              Follow-ups after your visit        Your next 10 appointments already scheduled     Jun 13, 2017  9:30 AM CDT   Nurse Only with [place] PI CMA/LPN   [facility] ([facility])    [address] [id number] [phone]            Jun 20, 2017  9:30 AM CDT   LAB with PI LAB   [facility] ([facility])    [address] [id number] [phone]           Patient must bring picture ID.  Patient should be prepared to give a urine specimen  Please do not eat 10-12 hours before your appointment if you are coming in fasting for labs on lipids, cholesterol, or glucose (sugar).  Pregnant women should follow their Care Team instructions. Water with medications is okay. Do not drink coffee or other fluids.   If you have concerns about taking  your medications, please ask at office or if scheduling via [employer], send a message by clicking on Secure Messaging, Message Your Care Team.            Jul 03, 2017  9:30 AM CDT   Nurse Only with [place] PI CMA/LPN   [facility] ([facility])    [address] [id number] [phone]            Jul 18, 2017  9:30 AM CDT   LAB with PI LAB   [facility] ([facility])    [address] [id number] [phone]           Patient must bring picture ID.  Patient should  "be prepared to give a urine specimen  Please do not eat 10-12 hours before your appointment if you are coming in fasting for labs on lipids, cholesterol, or glucose (sugar).  Pregnant women should follow their Care Team instructions. Water with medications is okay. Do not drink coffee or other fluids.   If you have concerns about taking  your medications, please ask at office or if scheduling via [employer], send a message by clicking on Secure Messaging, Message Your Care Team.              Who to contact     If you have questions or need follow up information about today's clinic visit or your schedule please contact [facility] directly at [phone].  Normal or non-critical lab and imaging results will be communicated to you by [employer], letter or phone within 4 business days after the clinic has received the results. If you do not hear from us within 7 days, please contact the clinic through [employer] or phone. If you have a critical or abnormal lab result, we will notify you by phone as soon as possible.  Submit refill requests through [employer] or call your pharmacy and they will forward the refill request to us. Please allow 3 business days for your refill to be completed.          Additional Information About Your Visit        Tradier Information     [employer] lets you send messages to your doctor, view your test results, renew your prescriptions, schedule appointments and more. To sign up, go to [email or web address] . Click on \"Log in\" on the left side of the screen, which will take you to the Welcome page. Then click on \"Sign up Now\" on the right side of the page.     You will be asked to enter the access code listed below, as well as some personal information. Please follow the directions to create your username and password.     Your access code is: [email or web address]  Expires: 2017 10:36 AM     Your access code will  in 90 days. If you need help or a new code, please call your [place] " "clinic or [phone].        Care EveryWhere ID     This is your Care EveryWhere ID. This could be used by other organizations to access your [place] medical records  [id number]        Your Vitals Were     Pulse Temperature Respirations Height Last Period BMI (Body Mass Index)    88 97.7  F (36.5  C) (Tympanic) 20 5' 2\" (1.575 m) 05/12/2017 31.28 kg/m2       Blood Pressure from Last 3 Encounters:   05/26/17 118/70   01/31/17 97/71   11/22/16 118/72    Weight from Last 3 Encounters:   05/26/17 171 lb (77.6 kg)   01/31/17 165 lb (74.8 kg)   11/22/16 162 lb (73.5 kg)              Today, you had the following     No orders found for display         Today's Medication Changes          These changes are accurate as of: 5/26/17 10:48 AM.  If you have any questions, ask your nurse or doctor.               These medicines have changed or have updated prescriptions.        Dose/Directions    DIPHENDRYL PO   This may have changed:  Another medication with the same name was removed. Continue taking this medication, and follow the directions you see here.   Changed by:  Josey Bonilla CNP        Dose:  50 mg   Take 50 mg by mouth every 3 hours as needed   Refills:  0       LORAZEPAM PO   This may have changed:  Another medication with the same name was removed. Continue taking this medication, and follow the directions you see here.   Changed by:  Josey Bonilla CNP        Dose:  1 mg   Take 1 mg by mouth every 3 hours as needed for anxiety   Refills:  0                Primary Care Provider Office Phone # Fax #    Josey Bonilla [phone]       [address] [address]        Thank you!     Thank you for choosing [facility]  for your care. Our goal is always to provide you with excellent care. Hearing back from our patients is one way we can continue to improve our services. Please take a few minutes to complete the " written survey that you may receive in the mail after your visit with us. Thank you!             Your Updated Medication List - Protect others around you: Learn how to safely use, store and throw away your medicines at www.disposemymeds.org.          This list is accurate as of: 5/26/17 10:48 AM.  Always use your most recent med list.                   Brand Name Dispense Instructions for use    * albuterol 108 (90 BASE) MCG/ACT Inhaler    PROAIR HFA/PROVENTIL HFA/VENTOLIN HFA    3 Inhaler    Inhale 2 puffs into the lungs every 6 hours as needed for shortness of breath / dyspnea or wheezing       * albuterol 108 (90 BASE) MCG/ACT Inhaler    PROAIR HFA/PROVENTIL HFA/VENTOLIN HFA    1 Inhaler    Inhale 2 puffs into the lungs every 6 hours (PRN for shortness of breath)       amoxicillin-clavulanate 875-125 MG per tablet    AUGMENTIN     Take 1 tablet by mouth 2 times daily       atropine 0.1 mg/mL Susp suspension      Take by mouth At Bedtime       CLOZAPINE PO    CLOZARIL         DEPEND OVERNIGHT BRIEFS MEDIUM Misc     120 each    1 each daily as needed       DIPHENDRYL PO      Take 50 mg by mouth every 3 hours as needed       ferrous sulfate 325 (65 FE) MG tablet    IRON    60 tablet    Take 1 tablet (325 mg) by mouth 2 times daily       haloperidol decanoate 100 MG/ML injection    HALDOL DECANOATE    1 mL    Inject 100 mg into the muscle every 21 days Order scanned into [employer], Order from Jody Schoenecker ([facility]) ph:[phone] Received on 04/11/2017. Refills 11.       * HALOPERIDOL PO      Take 5 mg by mouth every 3 hours as needed for agitation (up to three daily)       * HALOPERIDOL PO      Take 10 mg by mouth At Bedtime       IBUPROFEN PO      Take 400 mg by mouth every 4 hours as needed for moderate pain       levothyroxine 50 MCG tablet    SYNTHROID/LEVOTHROID    30 tablet    Take 1 tablet (50 mcg) by mouth daily DUE FOR LAB PRIOR TO FURTHER REFILL       LEXAPRO PO      Take 20 mg by  mouth daily       lidocaine 2 % topical gel    XYLOCAINE     Apply topically every 4 hours as needed for moderate pain       lithium 300 MG tablet      Take 300 mg by mouth 2 times daily       LORAZEPAM PO      Take 1 mg by mouth every 3 hours as needed for anxiety       Miconazole Nitrate 2 % ointment     30 g    Apply twice daily to the corners of the mouth for 7-14 days       multivitamin, therapeutic with minerals Tabs tablet     90 each    Take 1 tablet by mouth daily       nystatin 507736 UNIT/ML suspension    MYCOSTATIN    60 mL    Take 5 mLs (500,000 Units) by mouth 4 times daily       order for DME     1 Month    Equipment being ordered: Incontinent supplies for 1 year       pantoprazole 20 MG EC tablet    PROTONIX    30 tablet    Take 1 tablet (20 mg) by mouth daily (Reduced dosage to 20 mg)       polyethylene glycol powder    MIRALAX    510 g    Take 17 g (1 capful) by mouth daily       sennosides 8.6 MG tablet    SENOKOT    180 tablet    Take 3 tablets by mouth 2 times daily       simethicone 80 MG chewable tablet    MYLICON    180 tablet    Take 1 tablet (80 mg) by mouth every 6 hours as needed for flatulence or cramping PRN       TRAZODONE HCL PO      Take 200 mg by mouth At Bedtime       vitamin B complex with vitamin C Tabs tablet     30 tablet    Take 1 tablet by mouth daily       * Notice:  This list has 4 medication(s) that are the same as other medications prescribed for you. Read the directions carefully, and ask your doctor or other care provider to review them with you.

## 2017-05-26 NOTE — NURSING NOTE
"Chief Complaint   Patient presents with     Urgent Care     Follow up        Initial /70  Pulse 88  Temp 97.7  F (36.5  C) (Tympanic)  Resp 20  Wt 171 lb (77.6 kg)  LMP 05/12/2017  BMI 31.28 kg/m2 Estimated body mass index is 31.28 kg/(m^2) as calculated from the following:    Height as of 1/31/17: 5' 2\" (1.575 m).    Weight as of this encounter: 171 lb (77.6 kg).  Medication Reconciliation: complete  "

## 2017-05-26 NOTE — PROGRESS NOTES
SUBJECTIVE:                                                    Doreen Gramajo is a 46 year old female who presents to clinic today for the following health issues:      ED/UC Followup:    Facility:  [facility]   Date of visit: 05/18/2017  Reason for visit: lesion of hard palate - they wanted her assessed before the long holiday weekend  Current Status: Pain and swelling has decreased. Soft diet. Unable to wear her partial dental plate                             Dental appointment 05/30/2017.    Care Everywhere reviewed from 5/18/17 UC visit with Yas Coronel PA-C: 1-1.5 cm erythematus indurated lump on left hard palate just posterior to front teeth  Augmentin  Topical Lidocaine  Tylenol and Ibuprofen as needed    [facility] in [place] attempted a root canal to left incisor and encountered a problem so they stopped and re-scheduled. This lesion appeared soon thereafter. The Dentist was called when this occurred and they scheduled the re-check.    HPI:     Patient Active Problem List   Diagnosis     Schizophrenia (H)     Hypothyroidism     Bipolar affective (H)     Asthma, intermittent     Gastroesophageal reflux disease without esophagitis     CARDIOVASCULAR SCREENING; LDL GOAL LESS THAN 160     Health Care Home     Psychosis     Behavior disturbance     Cigarette nicotine dependence with nicotine-induced disorder     Iron deficiency     Chronic constipation     Urinary incontinence, unspecified type     Borderline intellectual functioning     History of anorexia nervosa     Encounter for general psychiatric examination, requested by authority     Rectal prolapse     Extrapyramidal symptom     History of eating disorder       Current Outpatient Prescriptions:      DiphenhydrAMINE HCl (DIPHENDRYL PO), Take 50 mg by mouth every 3 hours as needed, Disp: , Rfl:      LORAZEPAM PO, Take 1 mg by mouth every 3 hours as needed for anxiety, Disp: , Rfl:      amoxicillin-clavulanate (AUGMENTIN) 875-125 MG per  tablet, Take 1 tablet by mouth 2 times daily, Disp: , Rfl:      lidocaine (XYLOCAINE) 2 % topical gel, Apply topically every 4 hours as needed for moderate pain, Disp: , Rfl:      IBUPROFEN PO, Take 400 mg by mouth every 4 hours as needed for moderate pain, Disp: , Rfl:      polyethylene glycol (MIRALAX) powder, Take 17 g (1 capful) by mouth daily, Disp: 510 g, Rfl: 11     sennosides (SENOKOT) 8.6 MG tablet, Take 3 tablets by mouth 2 times daily, Disp: 180 tablet, Rfl: 3     haloperidol decanoate (HALDOL DECANOATE) 100 MG/ML injection, Inject 100 mg into the muscle every 21 days Order scanned into [facility], Order from Jody Schoenecker ([facility]) ph:[phone] Received on 04/11/2017. Refills 11., Disp: 1 mL, Rfl: 6     levothyroxine (SYNTHROID/LEVOTHROID) 50 MCG tablet, Take 1 tablet (50 mcg) by mouth daily DUE FOR LAB PRIOR TO FURTHER REFILL, Disp: 30 tablet, Rfl: 0     albuterol (PROAIR HFA/PROVENTIL HFA/VENTOLIN HFA) 108 (90 BASE) MCG/ACT Inhaler, Inhale 2 puffs into the lungs every 6 hours (PRN for shortness of breath), Disp: 1 Inhaler, Rfl: 3     ferrous sulfate (IRON) 325 (65 FE) MG tablet, Take 1 tablet (325 mg) by mouth 2 times daily, Disp: 60 tablet, Rfl: 11     vitamin B complex with vitamin C (VITAMIN  B COMPLEX) TABS tablet, Take 1 tablet by mouth daily, Disp: 30 tablet, Rfl: 11     multivitamin, therapeutic with minerals (CERTAVITE/ANTIOXIDANTS) TABS tablet, Take 1 tablet by mouth daily, Disp: 90 each, Rfl: 3     albuterol (PROAIR HFA, PROVENTIL HFA, VENTOLIN HFA) 108 (90 BASE) MCG/ACT inhaler, Inhale 2 puffs into the lungs every 6 hours as needed for shortness of breath / dyspnea or wheezing, Disp: 3 Inhaler, Rfl: 1     nystatin (MYCOSTATIN) 233894 UNIT/ML suspension, Take 5 mLs (500,000 Units) by mouth 4 times daily, Disp: 60 mL, Rfl: 0     Miconazole Nitrate 2 % OINT, Apply twice daily to the corners of the mouth for 7-14 days, Disp: 30 g, Rfl: 0     order for DME, Equipment being  "ordered: Incontinent supplies for 1 year, Disp: 1 Month, Rfl: 11     simethicone (MYLICON) 80 MG chewable tablet, Take 1 tablet (80 mg) by mouth every 6 hours as needed for flatulence or cramping PRN, Disp: 180 tablet, Rfl: 3     pantoprazole (PROTONIX) 20 MG tablet, Take 1 tablet (20 mg) by mouth daily (Reduced dosage to 20 mg), Disp: 30 tablet, Rfl: 11     HALOPERIDOL PO, Take 10 mg by mouth At Bedtime, Disp: , Rfl:      lithium 300 MG tablet, Take 300 mg by mouth 2 times daily, Disp: , Rfl:      CLOZAPINE PO (CLOZARIL), , Disp: , Rfl:      Incontinence Supply Disposable (DEPEND OVERNIGHT BRIEFS MEDIUM) MISC, 1 each daily as needed, Disp: 120 each, Rfl: 5     Escitalopram Oxalate (LEXAPRO PO), Take 20 mg by mouth daily , Disp: , Rfl:      TRAZODONE HCL PO, Take 200 mg by mouth At Bedtime, Disp: , Rfl:      atropine 0.1 mg/mL, Take by mouth At Bedtime, Disp: , Rfl:      HALOPERIDOL PO, Take 5 mg by mouth every 3 hours as needed for agitation (up to three daily) , Disp: , Rfl:      [DISCONTINUED] LORazepam (ATIVAN) 1 MG tablet, Take 1 tablet by mouth every 6 hours as needed. As needed for anxiety, none after 9pm. (Patient taking differently: Take 1 mg by mouth every 3 hours as needed As needed for anxiety, none after 9pm.), Disp: 60 tablet, Rfl: 0  Labs reviewed in EPIC      Reviewed and updated as needed this visit by Provider  Allergies  Meds  Problems      ROS:  Constitutional, HEENT, cardiovascular, pulmonary, gi and gu systems are negative, except as otherwise noted.  ENT/MOUTH: lesion to roof of mouth- improving    PHYSICAL EXAM:   /70  Pulse 88  Temp 97.7  F (36.5  C) (Tympanic)  Resp 20  Ht 5' 2\" (1.575 m)  Wt 171 lb (77.6 kg)  LMP 05/12/2017  BMI 31.28 kg/m2  Body mass index is 31.28 kg/(m^2).  GENERAL APPEARANCE: healthy, alert and no distress  HENT: ear canals and TM's normal and nose and several cavities,   Roof of mouth just posterior to incisor: lesion 1 cm X 1 cm , mildly tender, not " erythemic  NECK: no adenopathy, no asymmetry, masses, or scars and thyroid normal to palpation  RESP: lungs clear to auscultation - no rales, rhonchi or wheezes  CV: regular rates and rhythm, normal S1 S2, no S3 or S4 and no murmur, click or rub  PSYCH: mentation appears normal and affect normal/bright      ASSESSMENT & PLAN:   Doreen was seen today for urgent care.    Diagnoses and all orders for this visit:    Lesion of hard palate        They will follow-up with already scheduled Dental apt 5/30/17  Patient Instructions   Lesion to hard palate appears to be healing     Continue with antibiotic, Lidocaine topical, and Ibuprofen and Tylenol as needed    Re-start mouth rinse (salt water)        Risks, benefits, side effects and rationale for treatment plan fully discussed with the patient and understanding expressed.    RENÉ Bhagat-BC  [facility]

## 2017-05-26 NOTE — PATIENT INSTRUCTIONS
Lesion to hard palate appears to be healing     Continue with antibiotic, Lidocaine topical, and Ibuprofen and Tylenol as needed    Re-start mouth rinse (salt water)

## 2017-05-30 ENCOUNTER — TELEPHONE (OUTPATIENT)
Dept: FAMILY MEDICINE | Facility: CLINIC | Age: 47
End: 2017-05-30

## 2017-05-30 NOTE — TELEPHONE ENCOUNTER
Jalen, one of [facility]'s caregivers left a message this AM stating that patient was with her family over the weekend and the family forgot to give her a dose of her Miralax powder. The house did restart it the Mirilax on Monday and patient is having no issues.    Meliza [place]

## 2017-06-01 DIAGNOSIS — K21.9 GASTROESOPHAGEAL REFLUX DISEASE WITHOUT ESOPHAGITIS: ICD-10-CM

## 2017-06-01 DIAGNOSIS — E03.9 HYPOTHYROIDISM, UNSPECIFIED TYPE: ICD-10-CM

## 2017-06-01 RX ORDER — LEVOTHYROXINE SODIUM 50 UG/1
50 TABLET ORAL DAILY
Qty: 30 TABLET | Refills: 10 | Status: SHIPPED | OUTPATIENT
Start: 2017-06-01 | End: 2018-03-09

## 2017-06-01 RX ORDER — PANTOPRAZOLE SODIUM 20 MG/1
TABLET, DELAYED RELEASE ORAL
Qty: 30 TABLET | Refills: 5 | Status: SHIPPED | OUTPATIENT
Start: 2017-06-01 | End: 2017-10-23

## 2017-06-01 NOTE — TELEPHONE ENCOUNTER
levothyroxine     Last Written Prescription Date: 4/7/2017  Last Quantity: 30, # refills: 0  Last Office Visit with [facility], [facility] or [facility] prescribing provider: 5/26/2017   Next 5 appointments (look out 90 days)     Jun 13, 2017  9:30 AM CDT   Nurse Only with [place] PI CMA/LPN   [facility]    [address] [address]   [phone]            Jul 03, 2017  9:30 AM CDT   Nurse Only with [place] PI CMA/LPN   [facility]    [address] [id number] [phone]                   TSH   Date Value Ref Range Status   04/25/2017 3.94 0.40 - 4.00 mU/L Final     pantoprazole      Last Written Prescription Date: 6/23/2016  Last Fill Quantity: 30,  # refills: 11   Last Office Visit with [facility], [facility] or [facility] prescribing provider: 5/26/2017                                         Next 5 appointments (look out 90 days)     Jun 13, 2017  9:30 AM CDT   Nurse Only with [place] PI CMA/LPN   [facility]    [address] [id number] [phone]            Jul 03, 2017  9:30 AM CDT   Nurse Only with [place] PI CMA/LPN   [facility]    [address] [id number] [phone]

## 2017-06-01 NOTE — TELEPHONE ENCOUNTER
TSH   Date Value Ref Range Status   04/25/2017 3.94 0.40 - 4.00 mU/L Final   05/10/2016 2.54 0.40 - 4.00 mU/L Final   03/10/2015 3.04 0.40 - 4.00 mU/L Final     Comment:     Effective 7/30/2014, the reference range for this assay has changed to reflect   new instrumentation/methodology.     02/04/2014 2.46 0.4 - 5.0 mU/L Final   05/21/2013 2.90 0.4 - 5.0 mU/L Final     Refill given.  DIANA Shukla RN

## 2017-06-13 ENCOUNTER — ALLIED HEALTH/NURSE VISIT (OUTPATIENT)
Dept: FAMILY MEDICINE | Facility: CLINIC | Age: 47
End: 2017-06-13
Payer: MEDICARE

## 2017-06-13 DIAGNOSIS — F91.9 DISTURBANCE OF CONDUCT: ICD-10-CM

## 2017-06-13 DIAGNOSIS — F20.9 SCHIZOPHRENIA, UNSPECIFIED TYPE (H): ICD-10-CM

## 2017-06-13 PROCEDURE — 96372 THER/PROPH/DIAG INJ SC/IM: CPT

## 2017-06-13 PROCEDURE — 99207 ZZC NO CHARGE NURSE ONLY: CPT

## 2017-06-20 DIAGNOSIS — F20.0 PARANOID SCHIZOPHRENIA, SUBCHRONIC CONDITION (H): ICD-10-CM

## 2017-06-20 DIAGNOSIS — Z79.899 ENCOUNTER FOR LONG-TERM (CURRENT) USE OF MEDICATIONS: ICD-10-CM

## 2017-06-20 LAB
BASOPHILS # BLD AUTO: 0 10E9/L (ref 0–0.2)
BASOPHILS NFR BLD AUTO: 0.4 %
DIFFERENTIAL METHOD BLD: ABNORMAL
EOSINOPHIL # BLD AUTO: 0.2 10E9/L (ref 0–0.7)
EOSINOPHIL NFR BLD AUTO: 1.6 %
ERYTHROCYTE [DISTWIDTH] IN BLOOD BY AUTOMATED COUNT: 13.6 % (ref 10–15)
HCT VFR BLD AUTO: 36.5 % (ref 35–47)
HGB BLD-MCNC: 12 G/DL (ref 11.7–15.7)
LYMPHOCYTES # BLD AUTO: 1.8 10E9/L (ref 0.8–5.3)
LYMPHOCYTES NFR BLD AUTO: 17.5 %
MCH RBC QN AUTO: 32.9 PG (ref 26.5–33)
MCHC RBC AUTO-ENTMCNC: 32.9 G/DL (ref 31.5–36.5)
MCV RBC AUTO: 100 FL (ref 78–100)
MONOCYTES # BLD AUTO: 0.7 10E9/L (ref 0–1.3)
MONOCYTES NFR BLD AUTO: 6.8 %
NEUTROPHILS # BLD AUTO: 7.6 10E9/L (ref 1.6–8.3)
NEUTROPHILS NFR BLD AUTO: 73.7 %
PLATELET # BLD AUTO: 371 10E9/L (ref 150–450)
RBC # BLD AUTO: 3.65 10E12/L (ref 3.8–5.2)
WBC # BLD AUTO: 10.3 10E9/L (ref 4–11)

## 2017-06-20 PROCEDURE — 36415 COLL VENOUS BLD VENIPUNCTURE: CPT | Performed by: NURSE PRACTITIONER

## 2017-06-20 PROCEDURE — 85025 COMPLETE CBC W/AUTO DIFF WBC: CPT | Performed by: NURSE PRACTITIONER

## 2017-07-03 ENCOUNTER — ALLIED HEALTH/NURSE VISIT (OUTPATIENT)
Dept: FAMILY MEDICINE | Facility: CLINIC | Age: 47
End: 2017-07-03
Payer: MEDICARE

## 2017-07-03 DIAGNOSIS — F31.9 BIPOLAR AFFECTIVE DISORDER, REMISSION STATUS UNSPECIFIED (H): Primary | Chronic | ICD-10-CM

## 2017-07-03 DIAGNOSIS — F20.9 SCHIZOPHRENIA, UNSPECIFIED TYPE (H): ICD-10-CM

## 2017-07-03 PROCEDURE — 96372 THER/PROPH/DIAG INJ SC/IM: CPT

## 2017-07-03 PROCEDURE — 99207 ZZC NO CHARGE NURSE ONLY: CPT

## 2017-07-18 ENCOUNTER — OFFICE VISIT (OUTPATIENT)
Dept: FAMILY MEDICINE | Facility: CLINIC | Age: 47
End: 2017-07-18
Payer: MEDICARE

## 2017-07-18 VITALS
HEART RATE: 84 BPM | DIASTOLIC BLOOD PRESSURE: 68 MMHG | RESPIRATION RATE: 20 BRPM | BODY MASS INDEX: 28.34 KG/M2 | TEMPERATURE: 98.7 F | SYSTOLIC BLOOD PRESSURE: 124 MMHG | HEIGHT: 62 IN | WEIGHT: 154 LBS

## 2017-07-18 DIAGNOSIS — K21.9 GASTROESOPHAGEAL REFLUX DISEASE WITHOUT ESOPHAGITIS: ICD-10-CM

## 2017-07-18 DIAGNOSIS — Z00.00 ROUTINE GENERAL MEDICAL EXAMINATION AT A HEALTH CARE FACILITY: ICD-10-CM

## 2017-07-18 DIAGNOSIS — F17.219 CIGARETTE NICOTINE DEPENDENCE WITH NICOTINE-INDUCED DISORDER: ICD-10-CM

## 2017-07-18 DIAGNOSIS — E03.9 HYPOTHYROIDISM, UNSPECIFIED TYPE: Chronic | ICD-10-CM

## 2017-07-18 DIAGNOSIS — F20.0 PARANOID SCHIZOPHRENIA, SUBCHRONIC CONDITION (H): ICD-10-CM

## 2017-07-18 DIAGNOSIS — Z79.899 ENCOUNTER FOR LONG-TERM (CURRENT) USE OF MEDICATIONS: Primary | ICD-10-CM

## 2017-07-18 DIAGNOSIS — J45.20 ASTHMA, INTERMITTENT, UNCOMPLICATED: Chronic | ICD-10-CM

## 2017-07-18 DIAGNOSIS — R32 URINARY INCONTINENCE, UNSPECIFIED TYPE: ICD-10-CM

## 2017-07-18 DIAGNOSIS — F25.9 SCHIZOAFFECTIVE DISORDER, UNSPECIFIED TYPE (H): Chronic | ICD-10-CM

## 2017-07-18 DIAGNOSIS — F31.9 BIPOLAR AFFECTIVE DISORDER, REMISSION STATUS UNSPECIFIED (H): Chronic | ICD-10-CM

## 2017-07-18 DIAGNOSIS — K59.09 CHRONIC CONSTIPATION: ICD-10-CM

## 2017-07-18 DIAGNOSIS — Z78.9 LIVES IN GROUP HOME: Primary | ICD-10-CM

## 2017-07-18 DIAGNOSIS — F20.9 SCHIZOPHRENIA, UNSPECIFIED TYPE (H): Chronic | ICD-10-CM

## 2017-07-18 LAB
BASOPHILS # BLD AUTO: 0 10E9/L (ref 0–0.2)
BASOPHILS NFR BLD AUTO: 0.3 %
DIFFERENTIAL METHOD BLD: NORMAL
EOSINOPHIL # BLD AUTO: 0.2 10E9/L (ref 0–0.7)
EOSINOPHIL NFR BLD AUTO: 1.8 %
ERYTHROCYTE [DISTWIDTH] IN BLOOD BY AUTOMATED COUNT: 13.6 % (ref 10–15)
HCT VFR BLD AUTO: 38.8 % (ref 35–47)
HGB BLD-MCNC: 12.8 G/DL (ref 11.7–15.7)
LITHIUM SERPL-SCNC: 0.7 MMOL/L (ref 0.6–1.2)
LYMPHOCYTES # BLD AUTO: 1.9 10E9/L (ref 0.8–5.3)
LYMPHOCYTES NFR BLD AUTO: 18.1 %
MCH RBC QN AUTO: 32.9 PG (ref 26.5–33)
MCHC RBC AUTO-ENTMCNC: 33 G/DL (ref 31.5–36.5)
MCV RBC AUTO: 100 FL (ref 78–100)
MONOCYTES # BLD AUTO: 0.7 10E9/L (ref 0–1.3)
MONOCYTES NFR BLD AUTO: 6.1 %
NEUTROPHILS # BLD AUTO: 7.8 10E9/L (ref 1.6–8.3)
NEUTROPHILS NFR BLD AUTO: 73.7 %
PLATELET # BLD AUTO: 416 10E9/L (ref 150–450)
RBC # BLD AUTO: 3.89 10E12/L (ref 3.8–5.2)
WBC # BLD AUTO: 10.6 10E9/L (ref 4–11)

## 2017-07-18 PROCEDURE — 99214 OFFICE O/P EST MOD 30 MIN: CPT | Performed by: NURSE PRACTITIONER

## 2017-07-18 PROCEDURE — 80178 ASSAY OF LITHIUM: CPT | Performed by: NURSE PRACTITIONER

## 2017-07-18 PROCEDURE — 85025 COMPLETE CBC W/AUTO DIFF WBC: CPT | Performed by: NURSE PRACTITIONER

## 2017-07-18 PROCEDURE — 36415 COLL VENOUS BLD VENIPUNCTURE: CPT | Performed by: NURSE PRACTITIONER

## 2017-07-18 RX ORDER — CLOZAPINE 200 MG/1
TABLET ORAL
COMMUNITY
Start: 2017-07-18

## 2017-07-18 RX ORDER — INCONTINENCE PAD,LINER,DISP
1 EACH MISCELLANEOUS DAILY PRN
Qty: 31 EACH | Refills: 11 | Status: SHIPPED | OUTPATIENT
Start: 2017-07-18

## 2017-07-18 RX ORDER — SENNOSIDES 8.6 MG
TABLET ORAL
Qty: 180 TABLET | Refills: 5 | Status: SHIPPED | OUTPATIENT
Start: 2017-07-18 | End: 2017-11-22

## 2017-07-18 NOTE — PROGRESS NOTES
SUBJECTIVE:                                                    Doreen Gramajo is a 46 year old female who presents to clinic today for the following health issues:      Asthma- stable, continue Asthma meds and recommend smoking cessation  GERD- stable, continue Protonix  Hypothyroidism- stable, continue Levothyroxine  Tobacco- 1 pack lasts 3 days. Not ready to quit  Iron deficiency- stable, continues taking supplement  Urinary incontinence- chronic, needs DME for depends    Wt Readings from Last 10 Encounters:   07/18/17 154 lb (69.9 kg)   05/26/17 171 lb (77.6 kg)   01/31/17 165 lb (74.8 kg)   11/22/16 162 lb (73.5 kg)   09/29/16 162 lb 6.4 oz (73.7 kg)   09/06/16 163 lb (73.9 kg)   07/13/16 154 lb (69.9 kg)   06/21/16 148 lb (67.1 kg)   11/17/15 135 lb (61.2 kg)   06/23/15 160 lb (72.6 kg)       ACT Total Scores 7/18/2017   ACT TOTAL SCORE (Goal Greater than or Equal to 20) 22   In the past 12 months, how many times did you visit the emergency room for your asthma without being admitted to the hospital? 0   In the past 12 months, how many times were you hospitalized overnight because of your asthma? 0       TSH   Date Value Ref Range Status   04/25/2017 3.94 0.40 - 4.00 mU/L Final   05/10/2016 2.54 0.40 - 4.00 mU/L Final   03/10/2015 3.04 0.40 - 4.00 mU/L Final     Comment:     Effective 7/30/2014, the reference range for this assay has changed to reflect   new instrumentation/methodology.     02/04/2014 2.46 0.4 - 5.0 mU/L Final   05/21/2013 2.90 0.4 - 5.0 mU/L Final     LDL Cholesterol Calculated   Date Value Ref Range Status   04/25/2017 99 <100 mg/dL Final     Comment:     Desirable:       <100 mg/dl   05/10/2016 93 <100 mg/dL Final     Comment:     Desirable:       <100 mg/dl     BP Readings from Last 6 Encounters:   07/18/17 124/68   05/26/17 118/70   01/31/17 97/71   11/22/16 118/72   09/29/16 112/76   09/06/16 110/62       HPI:   PCP:  Josey Bonilla, [facility] [phone]    Patient Active Problem  List   Diagnosis     Schizophrenia (H)     Hypothyroidism     Bipolar affective (H)     Asthma, intermittent     Gastroesophageal reflux disease without esophagitis     CARDIOVASCULAR SCREENING; LDL GOAL LESS THAN 160     Health Care Home     Psychosis     Behavior disturbance     Cigarette nicotine dependence with nicotine-induced disorder     Iron deficiency     Chronic constipation     Urinary incontinence, unspecified type     Borderline intellectual functioning     History of anorexia nervosa     Encounter for general psychiatric examination, requested by authority     Rectal prolapse     Extrapyramidal symptom     History of eating disorder     Disturbance of conduct     Current Outpatient Prescriptions   Medication     Incontinence Supply Disposable (DEPEND UNDERGARMENTS) MISC     sennosides (SENOKOT) 8.6 MG tablet     cloZAPine (CLOZARIL) 200 MG tablet     pantoprazole (PROTONIX) 20 MG EC tablet     levothyroxine (SYNTHROID/LEVOTHROID) 50 MCG tablet     DiphenhydrAMINE HCl (DIPHENDRYL PO)     LORAZEPAM PO     IBUPROFEN PO     polyethylene glycol (MIRALAX) powder     haloperidol decanoate (HALDOL DECANOATE) 100 MG/ML injection     albuterol (PROAIR HFA/PROVENTIL HFA/VENTOLIN HFA) 108 (90 BASE) MCG/ACT Inhaler     ferrous sulfate (IRON) 325 (65 FE) MG tablet     vitamin B complex with vitamin C (VITAMIN  B COMPLEX) TABS tablet     multivitamin, therapeutic with minerals (CERTAVITE/ANTIOXIDANTS) TABS tablet     simethicone (MYLICON) 80 MG chewable tablet     HALOPERIDOL PO     lithium 300 MG tablet     Escitalopram Oxalate (LEXAPRO PO)     TRAZODONE HCL PO     atropine 0.1 mg/mL     HALOPERIDOL PO     [DISCONTINUED] albuterol (PROAIR HFA, PROVENTIL HFA, VENTOLIN HFA) 108 (90 BASE) MCG/ACT inhaler     [DISCONTINUED] CLOZAPINE PO (CLOZARIL)     No current facility-administered medications for this visit.        Reviewed and updated:  Tobacco  Allergies  Meds  Med Hx  Surg Hx  Fam Hx  Soc Hx  "    ROS:  Constitutional, neuro, ENT, endocrine, pulmonary, cardiac, gastrointestinal, genitourinary, musculoskeletal, integument and psychiatric systems are negative, except as otherwise noted.  GI: NEGATIVE for nausea, abdominal pain, heartburn, or change in bowel habits, constipation and Hx GERD  : urinary incontinence  PSYCHIATRIC: multiple psych diagnoses    PHYSICAL EXAM:   /68 (BP Location: Right arm, Patient Position: Sitting, Cuff Size: Adult Regular)  Pulse 84  Temp 98.7  F (37.1  C) (Tympanic)  Resp 20  Ht 5' 2\" (1.575 m)  Wt 154 lb (69.9 kg)  LMP 07/09/2017  BMI 28.17 kg/m2  Body mass index is 28.17 kg/(m^2).  See scanned physical    ASSESSMENT & PLAN:   Doreen was seen today for physical.    Diagnoses and all orders for this visit:    Lives in group home    Routine general medical examination at a health care facility    Schizophrenia, unspecified type (H)    Bipolar affective disorder, remission status unspecified (H)    Asthma, intermittent, uncomplicated    Schizoaffective disorder, unspecified type (H)    Hypothyroidism, unspecified type    Cigarette nicotine dependence with nicotine-induced disorder    Gastroesophageal reflux disease without esophagitis    Urinary incontinence, unspecified type  -     Incontinence Supply Disposable (DEPEND UNDERGARMENTS) MISC; 1 each daily as needed MEDIUM sized briefs    Chronic constipation  -     sennosides (SENOKOT) 8.6 MG tablet; Take 3 tablets by mouth 2 times daily    Patient continues to follow with Psychiatry for multiple psychiatric disorders    Patient Instructions       Prevention Guidelines, Women Ages 40 to 49  Screening tests and vaccines are an important part of managing your health. Health counseling is essential, too. Below are guidelines for these, for women ages 40 to 49. Talk with your healthcare provider to make sure you re up-to-date on what you need.  Screening Who needs it How often   Type 2 diabetes or prediabetes All adults " beginning at age 45 and adults without symptoms at any age who are overweight or obese and have 1 or more additional risk factors for diabetes At least every 3 years   Alcohol misuse All women in this age group At routine exams   Blood pressure All women in this age group Every 2 years if your blood pressure is less than 120/80 mm Hg; yearly if your systolic blood pressure is 120 to 139 mm Hg, or your diastolic blood pressure reading is 80 to 89 mm Hg   Breast cancer All women in this age group Yearly mammogram and clinical breast exam2  Each woman should make her own decision. If a woman decides to have mammograms before age 50, they should be done every 2 years.3   Cervical cancer All women in this age group, except women who have had a complete hysterectomy Pap test every 3 years or Pap test plus human papilloma virus (HPV) test every 5 years   Chlamydia Women at increased risk for infection At routine exams if you're at risk or have symptoms   Depression All women in this age group At routine exams   Gonorrhea Sexually active women at increased risk for infection At routine exams   Hepatitis C Anyone at increased risk; 1 time for those born between 1945 and 1965 At routine exams   High cholesterol or triglycerides All women ages 45 and older who are at risk for coronary artery disease; younger women, talk with your healthcare provider At least every 5 years   HIV All women At routine exams   Obesity All women in this age group At routine exams   Syphilis Women at increased risk for infection - talk with your healthcare provider At routine exams   Tuberculosis Women at increased risk for infection - talk with your healthcare provider Ask your healthcare provider   Vision All women in this age group Complete exam at age 40 and eye exams every 2 to 4 years. If you have a chronic disease, ask your healthcare provider how often you should have your eyes examined.4   Vaccine Who needs it How often   Chickenpox  (varicella) All women in this age group who have no record of this infection or vaccine 2 doses; the second dose should be given at least 4 weeks after the first dose   Hepatitis A Women at increased risk for infection - talk with your healthcare provider 2 doses given 6 months apart   Hepatitis B Women at increased risk for infection - talk with your healthcare provider 3 doses over 6 months; second dose should be given 1 month after the first dose; the third dose should be given at least 2 months after the second dose and at least 4 months after the first dose   Haemophilus influenzae Type B (HIB) Women at increased risk 1 to 3 doses   Influenza (flu) All women in this age group Once a year   Measles, mumps, rubella (MMR) All women in this age group who have no record of these infections or vaccines 1 or 2 doses   Meningococcal Women at increased risk for infection - talk with your healthcare provider 1 or more doses   Pneumococcal conjugate vaccine (PCV13) and pneumococcal polysaccharide vaccine (PPSV23) Women at increased risk for infection - talk with your healthcare provider 1 or 2 doses   Tetanus/diphtheria/pertussis (Td/Tdap) booster All women in this age group A one-time dose of Tdap instead of a Td booster after age 18, then Td every 10 years   Counseling Who needs it How often   BRCA gene mutation testing for breast and ovarian cancer susceptibility Women with increased risk for having gene mutation When your risk is known   Breast cancer and chemoprevention Women at high risk for breast cancer When your risk is known   Diet and exercise Women who are overweight or obese When diagnosed, and then at routine exams   Domestic violence Women at the age in which they are able to have children At routine exams   Sexually transmitted infection prevention Women at increased risk for infection - talk with your healthcare provider At routine exams   Use of tobacco and the health effects it can cause All women in  this age group Every exam   1[facility] Association  2American Cancer Society  3[place].S[place] Preventive Services Task Force  4[facility] [employer] of [employer]  Date Last Reviewed: 8/27/2015 2000-2017 [employer]. [address] [place] [address], [place] [address]. All rights reserved. This information is not intended as a substitute for professional medical care. Always follow your healthcare professional's instructions.          Risks, benefits, side effects and rationale for treatment plan fully discussed with the patient and understanding expressed.    RENÉ Bhagat-BC  [facility]

## 2017-07-18 NOTE — MR AVS SNAPSHOT
After Visit Summary   7/18/2017    Doreen Gramajo    MRN: [id number]           Patient Information     Date Of Birth          1970        Visit Information        Provider Department      7/18/2017 9:00 AM Josey Bonilla CNP [facility]        Today's Diagnoses     Lives in group home    -  1    Routine general medical examination at a health care facility        Schizophrenia, unspecified type (H)        Bipolar affective disorder, remission status unspecified (H)        Asthma, intermittent, uncomplicated        Schizoaffective disorder, unspecified type (H)        Hypothyroidism, unspecified type        Cigarette nicotine dependence with nicotine-induced disorder        Gastroesophageal reflux disease without esophagitis        Urinary incontinence, unspecified type        Chronic constipation          Care Instructions      Prevention Guidelines, Women Ages 40 to 49  Screening tests and vaccines are an important part of managing your health. Health counseling is essential, too. Below are guidelines for these, for women ages 40 to 49. Talk with your healthcare provider to make sure you re up-to-date on what you need.  Screening Who needs it How often   Type 2 diabetes or prediabetes All adults beginning at age 45 and adults without symptoms at any age who are overweight or obese and have 1 or more additional risk factors for diabetes At least every 3 years   Alcohol misuse All women in this age group At routine exams   Blood pressure All women in this age group Every 2 years if your blood pressure is less than 120/80 mm Hg; yearly if your systolic blood pressure is 120 to 139 mm Hg, or your diastolic blood pressure reading is 80 to 89 mm Hg   Breast cancer All women in this age group Yearly mammogram and clinical breast exam2  Each woman should make her own decision. If a woman decides to have mammograms before age 50, they should be done every 2 years.3   Cervical  cancer All women in this age group, except women who have had a complete hysterectomy Pap test every 3 years or Pap test plus human papilloma virus (HPV) test every 5 years   Chlamydia Women at increased risk for infection At routine exams if you're at risk or have symptoms   Depression All women in this age group At routine exams   Gonorrhea Sexually active women at increased risk for infection At routine exams   Hepatitis C Anyone at increased risk; 1 time for those born between 1945 and 1965 At routine exams   High cholesterol or triglycerides All women ages 45 and older who are at risk for coronary artery disease; younger women, talk with your healthcare provider At least every 5 years   HIV All women At routine exams   Obesity All women in this age group At routine exams   Syphilis Women at increased risk for infection - talk with your healthcare provider At routine exams   Tuberculosis Women at increased risk for infection - talk with your healthcare provider Ask your healthcare provider   Vision All women in this age group Complete exam at age 40 and eye exams every 2 to 4 years. If you have a chronic disease, ask your healthcare provider how often you should have your eyes examined.4   Vaccine Who needs it How often   Chickenpox (varicella) All women in this age group who have no record of this infection or vaccine 2 doses; the second dose should be given at least 4 weeks after the first dose   Hepatitis A Women at increased risk for infection - talk with your healthcare provider 2 doses given 6 months apart   Hepatitis B Women at increased risk for infection - talk with your healthcare provider 3 doses over 6 months; second dose should be given 1 month after the first dose; the third dose should be given at least 2 months after the second dose and at least 4 months after the first dose   Haemophilus influenzae Type B (HIB) Women at increased risk 1 to 3 doses   Influenza (flu) All women in this age group  Once a year   Measles, mumps, rubella (MMR) All women in this age group who have no record of these infections or vaccines 1 or 2 doses   Meningococcal Women at increased risk for infection - talk with your healthcare provider 1 or more doses   Pneumococcal conjugate vaccine (PCV13) and pneumococcal polysaccharide vaccine (PPSV23) Women at increased risk for infection - talk with your healthcare provider 1 or 2 doses   Tetanus/diphtheria/pertussis (Td/Tdap) booster All women in this age group A one-time dose of Tdap instead of a Td booster after age 18, then Td every 10 years   Counseling Who needs it How often   BRCA gene mutation testing for breast and ovarian cancer susceptibility Women with increased risk for having gene mutation When your risk is known   Breast cancer and chemoprevention Women at high risk for breast cancer When your risk is known   Diet and exercise Women who are overweight or obese When diagnosed, and then at routine exams   Domestic violence Women at the age in which they are able to have children At routine exams   Sexually transmitted infection prevention Women at increased risk for infection - talk with your healthcare provider At routine exams   Use of tobacco and the health effects it can cause All women in this age group Every exam   1American Diabetes Association  2American Cancer Society  3U.S. Preventive Services Task Force  4[facility] Academy of Ophthalmology  Date Last Reviewed: 8/27/2015 2000-2017 [employer]. [address] [place] [address], [place] [address]. All rights reserved. This information is not intended as a substitute for professional medical care. Always follow your healthcare professional's instructions.                Follow-ups after your visit        Future tests that were ordered for you today     Open Future Orders        Priority Expected Expires Ordered    *MA Screening Digital Bilateral Routine  7/18/2018 7/18/2017            Who to contact     If you  "have questions or need follow up information about today's clinic visit or your schedule please contact [facility] directly at [phone].  Normal or non-critical lab and imaging results will be communicated to you by MyChart, letter or phone within 4 business days after the clinic has received the results. If you do not hear from us within 7 days, please contact the clinic through [employer] or phone. If you have a critical or abnormal lab result, we will notify you by phone as soon as possible.  Submit refill requests through [employer] or call your pharmacy and they will forward the refill request to us. Please allow 3 business days for your refill to be completed.          Additional Information About Your Visit        [employer] Information     [employer] lets you send messages to your doctor, view your test results, renew your prescriptions, schedule appointments and more. To sign up, go to [email or web address] . Click on \"Log in\" on the left side of the screen, which will take you to the Welcome page. Then click on \"Sign up Now\" on the right side of the page.     You will be asked to enter the access code listed below, as well as some personal information. Please follow the directions to create your username and password.     Your access code is: [email or web address]  Expires: 2017  9:42 AM     Your access code will  in 90 days. If you need help or a new code, please call your [facility] or [phone].        Care EveryWhere ID     This is your Care EveryWhere ID. This could be used by other organizations to access your [place] medical records  [id number]        Your Vitals Were     Pulse Temperature Respirations Height Last Period BMI (Body Mass Index)    84 98.7  F (37.1  C) (Tympanic) 20 5' 2\" (1.575 m) 2017 28.17 kg/m2       Blood Pressure from Last 3 Encounters:   17 124/68   17 118/70   17 97/71    Weight from Last 3 Encounters:   17 154 lb (69.9 kg) "   05/26/17 171 lb (77.6 kg)   01/31/17 165 lb (74.8 kg)              We Performed the Following     Asthma Action Plan (AAP)          Today's Medication Changes          These changes are accurate as of: 7/18/17 10:59 AM.  If you have any questions, ask your nurse or doctor.               These medicines have changed or have updated prescriptions.        Dose/Directions    Clozapine 200 MG tablet   Commonly known as:  CLOZARIL   Indication:  150 mg every AM, 650 mg every HS   This may have changed:    - medication strength  - how to take this  - additional instructions   Changed by:  Josey Bonilla CNP        100 mg at breakfast and 650 mg after dinner   Refills:  0       DEPEND UNDERGARMENTS Misc   This may have changed:  additional instructions   Used for:  Urinary incontinence, unspecified type   Changed by:  Josey Bonilla CNP        Dose:  1 each   1 each daily as needed MEDIUM sized briefs   Quantity:  31 each   Refills:  11       sennosides 8.6 MG tablet   Commonly known as:  SENOKOT   This may have changed:  See the new instructions.   Used for:  Chronic constipation   Changed by:  Josey Bonilla CNP        Take 3 tablets by mouth 2 times daily   Quantity:  180 tablet   Refills:  5            Where to get your medicines      These medications were sent to [facility] [address]  [address] [facility] [address]    Hours:  test rx sent successfully  2/8/05   Phone:  [phone]     sennosides 8.6 MG tablet         Some of these will need a paper prescription and others can be bought over the counter.  Ask your nurse if you have questions.     Bring a paper prescription for each of these medications     DEPEND UNDERGARMENTS Misc                Primary Care Provider Office Phone # Fax #    Josey Bonilla [phone]       [address] [address]         Equal Access to Services     [facility]: Hadii aad ku hadirinacarisa Jagrutibethany, waaxda luqadaha, qaybta kaalmamarcela mathews, alise giles. So [facility] [phone].    ATENCIÓN: Si habla daphne, tiene a mariano disposición servicios gratuitos de asistencia lingüística. Llame al [phone].    We comply with applicable federal civil rights laws and [place] laws. We do not discriminate on the basis of race, color, national origin, age, disability sex, sexual orientation or gender identity.            Thank you!     Thank you for choosing [facility]  for your care. Our goal is always to provide you with excellent care. Hearing back from our patients is one way we can continue to improve our services. Please take a few minutes to complete the written survey that you may receive in the mail after your visit with us. Thank you!             Your Updated Medication List - Protect others around you: Learn how to safely use, store and throw away your medicines at www.disposemymeds.org.          This list is accurate as of: 7/18/17 10:59 AM.  Always use your most recent med list.                   Brand Name Dispense Instructions for use Diagnosis    albuterol 108 (90 BASE) MCG/ACT Inhaler    PROAIR HFA/PROVENTIL HFA/VENTOLIN HFA    1 Inhaler    Inhale 2 puffs into the lungs every 6 hours (PRN for shortness of breath)    Asthma, intermittent, uncomplicated       atropine 0.1 mg/mL Susp suspension      Take by mouth At Bedtime        Clozapine 200 MG tablet    CLOZARIL     100 mg at breakfast and 650 mg after dinner        DEPEND UNDERGARMENTS Misc     31 each    1 each daily as needed MEDIUM sized briefs    Urinary incontinence, unspecified type       DIPHENDRYL PO      Take 50 mg by mouth every 3 hours as needed        ferrous sulfate 325 (65 FE) MG tablet    IRON    60 tablet    Take 1 tablet (325 mg) by mouth 2 times daily    Iron deficiency       haloperidol decanoate 100 MG/ML  injection    HALDOL DECANOATE    1 mL    Inject 100 mg into the muscle every 21 days Order scanned into [facility], Order from Jody Schoenecker ([facility]) ph:[phone] Received on 04/11/2017. Refills 11.    Schizophrenia, unspecified type (H), Bipolar affective disorder, remission status unspecified (H)       * HALOPERIDOL PO      Take 5 mg by mouth every 3 hours as needed for agitation (up to three daily)        * HALOPERIDOL PO      Take 10 mg by mouth At Bedtime        IBUPROFEN PO      Take 400 mg by mouth every 4 hours as needed for moderate pain        levothyroxine 50 MCG tablet    SYNTHROID/LEVOTHROID    30 tablet    Take 1 tablet (50 mcg) by mouth daily    Hypothyroidism, unspecified type       LEXAPRO PO      Take 20 mg by mouth daily        lithium 300 MG tablet      Take 300 mg by mouth 2 times daily        LORAZEPAM PO      Take 1 mg by mouth every 3 hours as needed for anxiety        multivitamin, therapeutic with minerals Tabs tablet     90 each    Take 1 tablet by mouth daily    Schizoaffective disorder, unspecified type (H)       pantoprazole 20 MG EC tablet    PROTONIX    30 tablet    Take 1 tablet (20 mg) by mouth daily (Reduced dosage to 20 mg)    Gastroesophageal reflux disease without esophagitis       polyethylene glycol powder    MIRALAX    510 g    Take 17 g (1 capful) by mouth daily    Chronic constipation       sennosides 8.6 MG tablet    SENOKOT    180 tablet    Take 3 tablets by mouth 2 times daily    Chronic constipation       simethicone 80 MG chewable tablet    MYLICON    180 tablet    Take 1 tablet (80 mg) by mouth every 6 hours as needed for flatulence or cramping PRN    Flatulence, eructation and gas pain       TRAZODONE HCL PO      Take 200 mg by mouth At Bedtime        vitamin B complex with vitamin C Tabs tablet     30 tablet    Take 1 tablet by mouth daily    Iron deficiency       * Notice:  This list has 2 medication(s) that are the same as other medications  prescribed for you. Read the directions carefully, and ask your doctor or other care provider to review them with you.

## 2017-07-18 NOTE — LETTER
My Asthma Action Plan  Name: Doreen Gramajo   YOB: 1970  Date: 7/18/2017   My doctor: Josey Bonilla CNP   My clinic: [facility]        My Control Medicine: None  My Rescue Medicine: Albuterol (Proair/Ventolin/Proventil) inhaler 2 puffs as needed   My Asthma Severity: intermittent  Avoid your asthma triggers: exercise or sports               GREEN ZONE   Good Control    I feel good    No cough or wheeze    Can work, sleep and play without asthma symptoms       Take your asthma control medicine every day.     1. If exercise triggers your asthma, take your rescue medication    15 minutes before exercise or sports, and    During exercise if you have asthma symptoms  2. Spacer to use with inhaler: If you have a spacer, make sure to use it with your inhaler             YELLOW ZONE Getting Worse  I have ANY of these:    I do not feel good    Cough or wheeze    Chest feels tight    Wake up at night   1. Keep taking your Green Zone medications  2. Start taking your rescue medicine:    every 20 minutes for up to 1 hour. Then every 4 hours for 24-48 hours.  3. If you stay in the Yellow Zone for more than 12-24 hours, contact your doctor.  4. If you do not return to the Green Zone in 12-24 hours or you get worse, start taking your oral steroid medicine if prescribed by your provider.           RED ZONE Medical Alert - Get Help  I have ANY of these:    I feel awful    Medicine is not helping    Breathing getting harder    Trouble walking or talking    Nose opens wide to breathe       1. Take your rescue medicine NOW  2. If your provider has prescribed an oral steroid medicine, start taking it NOW  3. Call your doctor NOW  4. If you are still in the Red Zone after 20 minutes and you have not reached your doctor:    Take your rescue medicine again and    Call 911 or go to the emergency room right away    See your regular doctor within 2 weeks of an Emergency Room or Urgent Care visit for  follow-up treatment.        Electronically signed by: Nena Reagan, July 18, 2017    Annual Reminders:  Meet with Asthma Educator,  Flu Shot in the Fall, consider Pneumonia Vaccination for patients with asthma (aged 19 and older).    Pharmacy: [place], [place] - [facility] [address]                    Asthma Triggers  How To Control Things That Make Your Asthma Worse    Triggers are things that make your asthma worse.  Look at the list below to help you find your triggers and what you can do about them.  You can help prevent asthma flare-ups by staying away from your triggers.      Trigger                                                          What you can do   Cigarette Smoke  Tobacco smoke can make asthma worse. Do not allow smoking in your home, car or around you.  Be sure no one smokes at a child s day care or school.  If you smoke, ask your health care provider for ways to help you quit.  Ask family members to quit too.  Ask your health care provider for a referral to Quit Plan to help you quit smoking, or call 1-194-990-PLAN.     Colds, Flu, Bronchitis  These are common triggers of asthma. Wash your hands often.  Don t touch your eyes, nose or mouth.  Get a flu shot every year.     Dust Mites  These are tiny bugs that live in cloth or carpet. They are too small to see. Wash sheets and blankets in hot water every week.   Encase pillows and mattress in dust mite proof covers.  Avoid having carpet if you can. If you have carpet, vacuum weekly.   Use a dust mask and HEPA vacuum.   Pollen and Outdoor Mold  Some people are allergic to trees, grass, or weed pollen, or molds. Try to keep your windows closed.  Limit time out doors when pollen count is high.   Ask you health care provider about taking medicine during allergy season.     Animal Dander  Some people are allergic to skin flakes, urine or saliva from pets with fur or feathers. Keep pets with fur or feathers out of your  home.    If you can t keep the pet outdoors, then keep the pet out of your bedroom.  Keep the bedroom door closed.  Keep pets off cloth furniture and away from stuffed toys.     Mice, Rats, and Cockroaches  Some people are allergic to the waste from these pests.   Cover food and garbage.  Clean up spills and food crumbs.  Store grease in the refrigerator.   Keep food out of the bedroom.   Indoor Mold  This can be a trigger if your home has high moisture. Fix leaking faucets, pipes, or other sources of water.   Clean moldy surfaces.  Dehumidify basement if it is damp and smelly.   Smoke, Strong Odors, and Sprays  These can reduce air quality. Stay away from strong odors and sprays, such as perfume, powder, hair spray, paints, smoke incense, paint, cleaning products, candles and new carpet.   Exercise or Sports  Some people with asthma have this trigger. Be active!  Ask your doctor about taking medicine before sports or exercise to prevent symptoms.    Warm up for 5-10 minutes before and after sports or exercise.     Other Triggers of Asthma  Cold air:  Cover your nose and mouth with a scarf.  Sometimes laughing or crying can be a trigger.  Some medicines and food can trigger asthma.

## 2017-07-18 NOTE — NURSING NOTE
"Chief Complaint   Patient presents with     Physical     Annual ELIE see scanned copy        Initial /68 (BP Location: Right arm, Patient Position: Sitting, Cuff Size: Adult Regular)  Pulse 84  Temp 98.7  F (37.1  C) (Tympanic)  Resp 20  Ht 5' 2\" (1.575 m)  Wt 154 lb (69.9 kg)  LMP 07/09/2017  BMI 28.17 kg/m2 Estimated body mass index is 28.17 kg/(m^2) as calculated from the following:    Height as of this encounter: 5' 2\" (1.575 m).    Weight as of this encounter: 154 lb (69.9 kg).  Medication Reconciliation: complete    Health Maintenance that is potentially due pending provider review:  ACT    Done today .    "

## 2017-07-18 NOTE — PATIENT INSTRUCTIONS
Prevention Guidelines, Women Ages 40 to 49  Screening tests and vaccines are an important part of managing your health. Health counseling is essential, too. Below are guidelines for these, for women ages 40 to 49. Talk with your healthcare provider to make sure you re up-to-date on what you need.  Screening Who needs it How often   Type 2 diabetes or prediabetes All adults beginning at age 45 and adults without symptoms at any age who are overweight or obese and have 1 or more additional risk factors for diabetes At least every 3 years   Alcohol misuse All women in this age group At routine exams   Blood pressure All women in this age group Every 2 years if your blood pressure is less than 120/80 mm Hg; yearly if your systolic blood pressure is 120 to 139 mm Hg, or your diastolic blood pressure reading is 80 to 89 mm Hg   Breast cancer All women in this age group Yearly mammogram and clinical breast exam2  Each woman should make her own decision. If a woman decides to have mammograms before age 50, they should be done every 2 years.3   Cervical cancer All women in this age group, except women who have had a complete hysterectomy Pap test every 3 years or Pap test plus human papilloma virus (HPV) test every 5 years   Chlamydia Women at increased risk for infection At routine exams if you're at risk or have symptoms   Depression All women in this age group At routine exams   Gonorrhea Sexually active women at increased risk for infection At routine exams   Hepatitis C Anyone at increased risk; 1 time for those born between 1945 and 1965 At routine exams   High cholesterol or triglycerides All women ages 45 and older who are at risk for coronary artery disease; younger women, talk with your healthcare provider At least every 5 years   HIV All women At routine exams   Obesity All women in this age group At routine exams   Syphilis Women at increased risk for infection - talk with your healthcare provider At routine  exams   Tuberculosis Women at increased risk for infection - talk with your healthcare provider Ask your healthcare provider   Vision All women in this age group Complete exam at age 40 and eye exams every 2 to 4 years. If you have a chronic disease, ask your healthcare provider how often you should have your eyes examined.4   Vaccine Who needs it How often   Chickenpox (varicella) All women in this age group who have no record of this infection or vaccine 2 doses; the second dose should be given at least 4 weeks after the first dose   Hepatitis A Women at increased risk for infection - talk with your healthcare provider 2 doses given 6 months apart   Hepatitis B Women at increased risk for infection - talk with your healthcare provider 3 doses over 6 months; second dose should be given 1 month after the first dose; the third dose should be given at least 2 months after the second dose and at least 4 months after the first dose   Haemophilus influenzae Type B (HIB) Women at increased risk 1 to 3 doses   Influenza (flu) All women in this age group Once a year   Measles, mumps, rubella (MMR) All women in this age group who have no record of these infections or vaccines 1 or 2 doses   Meningococcal Women at increased risk for infection - talk with your healthcare provider 1 or more doses   Pneumococcal conjugate vaccine (PCV13) and pneumococcal polysaccharide vaccine (PPSV23) Women at increased risk for infection - talk with your healthcare provider 1 or 2 doses   Tetanus/diphtheria/pertussis (Td/Tdap) booster All women in this age group A one-time dose of Tdap instead of a Td booster after age 18, then Td every 10 years   Counseling Who needs it How often   BRCA gene mutation testing for breast and ovarian cancer susceptibility Women with increased risk for having gene mutation When your risk is known   Breast cancer and chemoprevention Women at high risk for breast cancer When your risk is known   Diet and exercise  Women who are overweight or obese When diagnosed, and then at routine exams   Domestic violence Women at the age in which they are able to have children At routine exams   Sexually transmitted infection prevention Women at increased risk for infection - talk with your healthcare provider At routine exams   Use of tobacco and the health effects it can cause All women in this age group Every exam   1[facility] Diabetes Association  2American Cancer Society  3U.S. Preventive Services Task Force  4[facility] [employer]  Date Last Reviewed: 8/27/2015 2000-2017 [employer]. [address] [place], [place] [address]. All rights reserved. This information is not intended as a substitute for professional medical care. Always follow your healthcare professional's instructions.

## 2017-07-19 DIAGNOSIS — F25.9 SCHIZOAFFECTIVE DISORDER, CHRONIC CONDITION (H): Primary | ICD-10-CM

## 2017-07-19 ASSESSMENT — ASTHMA QUESTIONNAIRES: ACT_TOTALSCORE: 22

## 2017-07-20 ENCOUNTER — TELEPHONE (OUTPATIENT)
Dept: FAMILY MEDICINE | Facility: CLINIC | Age: 47
End: 2017-07-20

## 2017-07-20 DIAGNOSIS — F91.9 DISTURBANCE OF CONDUCT: ICD-10-CM

## 2017-07-20 DIAGNOSIS — F20.9 SCHIZOPHRENIA, UNSPECIFIED TYPE (H): Chronic | ICD-10-CM

## 2017-07-20 DIAGNOSIS — R32 URINARY INCONTINENCE, UNSPECIFIED TYPE: ICD-10-CM

## 2017-07-20 DIAGNOSIS — F25.9 SCHIZOAFFECTIVE DISORDER, UNSPECIFIED TYPE (H): Chronic | ICD-10-CM

## 2017-07-20 DIAGNOSIS — K59.09 CHRONIC CONSTIPATION: Primary | ICD-10-CM

## 2017-07-20 DIAGNOSIS — R29.818 EXTRAPYRAMIDAL SYMPTOM: ICD-10-CM

## 2017-07-20 DIAGNOSIS — F31.9 BIPOLAR AFFECTIVE DISORDER, REMISSION STATUS UNSPECIFIED (H): Chronic | ICD-10-CM

## 2017-07-20 NOTE — TELEPHONE ENCOUNTER
Received a request form [facility] requesting incontinence supplies. If you feel this patient is able to get these products please send us an Rx for INCONTINENCE PRODUCTS (up to 300), Gloves, Wipes & Chux Pads. Please include all associated ICD-10 Diagnosis Codes. If you don't include a REFILL amount of quantity we can give the patient the products they need even if they are different products. If you feel this patient DOES NOT qualify, please stat on this form and send it back to me ASAP. Please also note that with their insurance if there is NOT an expiration date, I can make the Rx good for 5 years for this patient.    Thank you for your time in this matter. If you have any questions, please call us at [phone].    Please fax the completed copy back at [phone].

## 2017-07-31 ENCOUNTER — RADIANT APPOINTMENT (OUTPATIENT)
Dept: MAMMOGRAPHY | Facility: CLINIC | Age: 47
End: 2017-07-31
Attending: NURSE PRACTITIONER
Payer: MEDICARE

## 2017-07-31 DIAGNOSIS — Z12.31 VISIT FOR SCREENING MAMMOGRAM: ICD-10-CM

## 2017-07-31 PROCEDURE — G0202 SCR MAMMO BI INCL CAD: HCPCS | Mod: TC

## 2017-08-01 ENCOUNTER — ALLIED HEALTH/NURSE VISIT (OUTPATIENT)
Dept: FAMILY MEDICINE | Facility: CLINIC | Age: 47
End: 2017-08-01
Payer: MEDICARE

## 2017-08-01 DIAGNOSIS — F20.9 SCHIZOPHRENIA, UNSPECIFIED TYPE (H): ICD-10-CM

## 2017-08-01 PROCEDURE — 99207 ZZC NO CHARGE NURSE ONLY: CPT

## 2017-08-01 PROCEDURE — 96372 THER/PROPH/DIAG INJ SC/IM: CPT

## 2017-08-01 NOTE — MR AVS SNAPSHOT
After Visit Summary   8/1/2017    Doreen Gramajo    MRN: [id number]           Patient Information     Date Of Birth          1970        Visit Information        Provider Department      8/1/2017 9:30 AM [place] PI CMA/LPN [facility]        Today's Diagnoses     Schizophrenia, unspecified type (H)           Follow-ups after your visit        Your next 10 appointments already scheduled     Aug 15, 2017  9:00 AM CDT   LAB with [facility] LAB   [facility]    [address] [place] [id number]   [phone]           Patient must bring picture ID. Patient should be prepared to give a urine specimen  Please do not eat 10-12 hours before your appointment if you are coming in fasting for labs on lipids, cholesterol, or glucose (sugar). Pregnant women should follow their Care Team instructions. Water with medications is okay. Do not drink coffee or other fluids. If you have concerns about taking  your medications, please ask at office or if scheduling via MycooNt, send a message by clicking on Secure Messaging, Message Your Care Team.            Aug 22, 2017 10:15 AM CDT   Nurse Only with [place] PI CMA/LPN   [facility]    [address] [place] [id number]   [phone]            Sep 12, 2017 10:15 AM CDT   Nurse Only with [place] PI CMA/LPN   [facility]    [address] [place] [address]   [phone]            Sep 12, 2017 10:30 AM CDT   LAB with PI LAB   [facility]    [address] [place] [address]   [phone]           Patient must bring picture ID. Patient should be prepared to give a urine specimen  Please do not eat 10-12 hours before your appointment if you are coming in fasting for labs on lipids, cholesterol, or glucose (sugar). Pregnant women should follow their  Care Team instructions. Water with medications is okay. Do not drink coffee or other fluids. If you have concerns about taking  your medications, please ask at office or if scheduling via [employer]ite, send a message by clicking on Secure Messaging, Message Your Care Team.            Oct 03, 2017 10:15 AM CDT   Nurse Only with [place] PI GREY/LPN   [facility]    [address] [place] [id number] [phone]            Oct 10, 2017  9:00 AM CDT   LAB with PI LAB   [facility]    [address] [place] [id number] [phone]           Patient must bring picture ID. Patient should be prepared to give a urine specimen  Please do not eat 10-12 hours before your appointment if you are coming in fasting for labs on lipids, cholesterol, or glucose (sugar). Pregnant women should follow their Care Team instructions. Water with medications is okay. Do not drink coffee or other fluids. If you have concerns about taking  your medications, please ask at office or if scheduling via [employer], send a message by clicking on Secure Messaging, Message Your Care Team.              Who to contact     If you have questions or need follow up information about today's clinic visit or your schedule please contact [facility] directly at [phone].  Normal or non-critical lab and imaging results will be communicated to you by Flipzuhart, letter or phone within 4 business days after the clinic has received the results. If you do not hear from us within 7 days, please contact the clinic through Flipzuhart or phone. If you have a critical or abnormal lab result, we will notify you by phone as soon as possible.  Submit refill requests through Jobvite or call your pharmacy and they will forward the refill request to us. Please allow 3 business days for your refill to be completed.          Additional Information About Your Visit    "     MyChart Information     [facility] lets you send messages to your doctor, view your test results, renew your prescriptions, schedule appointments and more. To sign up, go to [email or web address] . Click on \"Log in\" on the left side of the screen, which will take you to the Welcome page. Then click on \"Sign up Now\" on the right side of the page.     You will be asked to enter the access code listed below, as well as some personal information. Please follow the directions to create your username and password.     Your access code is: [email or web address]  Expires: 2017  9:42 AM     Your access code will  in 90 days. If you need help or a new code, please call your [facility] clinic or [phone].        Care EveryWhere ID     This is your Care EveryWhere ID. This could be used by other organizations to access your [facility] medical records  [id number]        Your Vitals Were     Last Period                   2017            Blood Pressure from Last 3 Encounters:   17 124/68   17 118/70   17 97/71    Weight from Last 3 Encounters:   17 154 lb (69.9 kg)   17 171 lb (77.6 kg)   17 165 lb (74.8 kg)              We Performed the Following     HALOPERIDOL DECANOATE INJ     INJECTION INTRAMUSCULAR OR SUB-Q        Primary Care Provider Office Phone # Fax #    Josey Lawton Chad, [facility] [phone]       [address] [address]        Equal Access to Services     [facility]: Hadii maxwell velasquez hadasho Soomaali, waaxda luqadaha, qaybta kaalmada alise mathews . So [facility] [phone].    ATENCIÓN: Si habla español, tiene a mariano disposición servicios gratuitos de asistencia lingüística. Llame al [phone].    We comply with applicable federal civil rights laws and [place] laws. We do not discriminate on the basis of race, color, national origin, age, disability sex, sexual orientation " or gender identity.            Thank you!     Thank you for choosing [facility]  for your care. Our goal is always to provide you with excellent care. Hearing back from our patients is one way we can continue to improve our services. Please take a few minutes to complete the written survey that you may receive in the mail after your visit with us. Thank you!             Your Updated Medication List - Protect others around you: Learn how to safely use, store and throw away your medicines at www.disposemymeds.org.          This list is accurate as of: 8/1/17  9:35 AM.  Always use your most recent med list.                   Brand Name Dispense Instructions for use Diagnosis    albuterol 108 (90 BASE) MCG/ACT Inhaler    PROAIR HFA/PROVENTIL HFA/VENTOLIN HFA    1 Inhaler    Inhale 2 puffs into the lungs every 6 hours (PRN for shortness of breath)    Asthma, intermittent, uncomplicated       atropine 0.1 mg/mL Susp suspension      Take by mouth At Bedtime        Clozapine 200 MG tablet    CLOZARIL     100 mg at breakfast and 650 mg after dinner        DEPEND UNDERGARMENTS Misc     31 each    1 each daily as needed MEDIUM sized briefs    Urinary incontinence, unspecified type       DIPHENDRYL PO      Take 50 mg by mouth every 3 hours as needed        ferrous sulfate 325 (65 FE) MG tablet    IRON    60 tablet    Take 1 tablet (325 mg) by mouth 2 times daily    Iron deficiency       haloperidol decanoate 100 MG/ML injection    HALDOL DECANOATE    1 mL    Inject 100 mg into the muscle every 21 days Order scanned into [employer], Order from Jody Schoenecker ([facility]) ph:[phone] Received on 04/11/2017. Refills 11.    Schizophrenia, unspecified type (H), Bipolar affective disorder, remission status unspecified (H)       * HALOPERIDOL PO      Take 5 mg by mouth every 3 hours as needed for agitation (up to three daily)        * HALOPERIDOL PO      Take 10 mg by mouth At Bedtime         IBUPROFEN PO      Take 400 mg by mouth every 4 hours as needed for moderate pain        levothyroxine 50 MCG tablet    SYNTHROID/LEVOTHROID    30 tablet    Take 1 tablet (50 mcg) by mouth daily    Hypothyroidism, unspecified type       LEXAPRO PO      Take 20 mg by mouth daily        lithium 300 MG tablet      Take 300 mg by mouth 2 times daily        LORAZEPAM PO      Take 1 mg by mouth every 3 hours as needed for anxiety        multivitamin, therapeutic with minerals Tabs tablet     90 each    Take 1 tablet by mouth daily    Schizoaffective disorder, unspecified type (H)       * order for DME     1 each    INCONTINENT PRODUCTS (UP / MONTH)    Chronic constipation, Schizophrenia, unspecified type (H), Schizoaffective disorder, unspecified type (H), Disturbance of conduct, Extrapyramidal symptom, Urinary incontinence, unspecified type, Bipolar affective disorder, remission status unspecified (H)       * order for DME     1 each    GLOVES    Chronic constipation, Schizophrenia, unspecified type (H), Schizoaffective disorder, unspecified type (H), Disturbance of conduct, Extrapyramidal symptom, Urinary incontinence, unspecified type, Bipolar affective disorder, remission status unspecified (H)       * order for DME     1 each    INCONTINENT PERSONAL WIPES    Chronic constipation, Schizophrenia, unspecified type (H), Schizoaffective disorder, unspecified type (H), Disturbance of conduct, Extrapyramidal symptom, Urinary incontinence, unspecified type, Bipolar affective disorder, remission status unspecified (H)       * order for DME     1 each    CHUX PADS    Chronic constipation, Schizophrenia, unspecified type (H), Schizoaffective disorder, unspecified type (H), Disturbance of conduct, Extrapyramidal symptom, Urinary incontinence, unspecified type, Bipolar affective disorder, remission status unspecified (H)       pantoprazole 20 MG EC tablet    PROTONIX    30 tablet    Take 1 tablet (20 mg) by mouth daily  (Reduced dosage to 20 mg)    Gastroesophageal reflux disease without esophagitis       polyethylene glycol powder    MIRALAX    510 g    Take 17 g (1 capful) by mouth daily    Chronic constipation       sennosides 8.6 MG tablet    SENOKOT    180 tablet    Take 3 tablets by mouth 2 times daily    Chronic constipation       simethicone 80 MG chewable tablet    MYLICON    180 tablet    Take 1 tablet (80 mg) by mouth every 6 hours as needed for flatulence or cramping PRN    Flatulence, eructation and gas pain       TRAZODONE HCL PO      Take 200 mg by mouth At Bedtime        vitamin B complex with vitamin C Tabs tablet     30 tablet    Take 1 tablet by mouth daily    Iron deficiency       * Notice:  This list has 6 medication(s) that are the same as other medications prescribed for you. Read the directions carefully, and ask your doctor or other care provider to review them with you.

## 2017-08-01 NOTE — NURSING NOTE
Prior to injection verified patient identity using patient's name and date of birth.    injection of Haldol was given by Johanny Garcia. Patient instructed to remain in clinic for 15 minutes afterwards, and to report any adverse reaction to me immediately.

## 2017-08-14 ENCOUNTER — TELEPHONE (OUTPATIENT)
Dept: ALLERGY | Facility: CLINIC | Age: 47
End: 2017-08-14

## 2017-08-16 ENCOUNTER — TELEPHONE (OUTPATIENT)
Dept: FAMILY MEDICINE | Facility: CLINIC | Age: 47
End: 2017-08-16

## 2017-08-16 DIAGNOSIS — F20.0 PARANOID SCHIZOPHRENIA, SUBCHRONIC CONDITION (H): ICD-10-CM

## 2017-08-16 DIAGNOSIS — Z79.899 ENCOUNTER FOR LONG-TERM (CURRENT) USE OF MEDICATIONS: ICD-10-CM

## 2017-08-16 DIAGNOSIS — F25.9 SCHIZOAFFECTIVE DISORDER, CHRONIC CONDITION (H): ICD-10-CM

## 2017-08-16 LAB
BASOPHILS # BLD AUTO: 0 10E9/L (ref 0–0.2)
BASOPHILS NFR BLD AUTO: 0.2 %
DIFFERENTIAL METHOD BLD: ABNORMAL
EOSINOPHIL # BLD AUTO: 0.1 10E9/L (ref 0–0.7)
EOSINOPHIL NFR BLD AUTO: 0.9 %
ERYTHROCYTE [DISTWIDTH] IN BLOOD BY AUTOMATED COUNT: 14.2 % (ref 10–15)
HCT VFR BLD AUTO: 37.1 % (ref 35–47)
HGB BLD-MCNC: 12.2 G/DL (ref 11.7–15.7)
LYMPHOCYTES # BLD AUTO: 1.5 10E9/L (ref 0.8–5.3)
LYMPHOCYTES NFR BLD AUTO: 12.6 %
MCH RBC QN AUTO: 32.8 PG (ref 26.5–33)
MCHC RBC AUTO-ENTMCNC: 32.9 G/DL (ref 31.5–36.5)
MCV RBC AUTO: 100 FL (ref 78–100)
MONOCYTES # BLD AUTO: 0.6 10E9/L (ref 0–1.3)
MONOCYTES NFR BLD AUTO: 4.6 %
NEUTROPHILS # BLD AUTO: 9.8 10E9/L (ref 1.6–8.3)
NEUTROPHILS NFR BLD AUTO: 81.7 %
PLATELET # BLD AUTO: 379 10E9/L (ref 150–450)
RBC # BLD AUTO: 3.72 10E12/L (ref 3.8–5.2)
WBC # BLD AUTO: 12.1 10E9/L (ref 4–11)

## 2017-08-16 PROCEDURE — 85025 COMPLETE CBC W/AUTO DIFF WBC: CPT | Performed by: NURSE PRACTITIONER

## 2017-08-16 PROCEDURE — 80159 DRUG ASSAY CLOZAPINE: CPT | Mod: 90 | Performed by: NURSE PRACTITIONER

## 2017-08-16 PROCEDURE — 36415 COLL VENOUS BLD VENIPUNCTURE: CPT | Performed by: NURSE PRACTITIONER

## 2017-08-16 NOTE — TELEPHONE ENCOUNTER
Patient came in for some labs and asked the  if she could get an extension on her sudafed and her robitussin as she is still having symptoms. No fever. Please advise.    Meliza [place]

## 2017-08-16 NOTE — TELEPHONE ENCOUNTER
"[facility] and he stated \" she is actually getting better and she doesn't need any further medications \" he will call back with any further concerns.  Ratna Alejo RN    "

## 2017-08-19 LAB
CLOZAPINE AND METABOLITES TOTAL: 1018 NG/ML
CLOZAPINE SERPL-MCNC: 516 NG/ML
CLOZAPINE-N-OXIDE QUANT: 158 NG/ML
NORCLOZAPINE SERPL-MCNC: 344 NG/ML

## 2017-08-22 ENCOUNTER — ALLIED HEALTH/NURSE VISIT (OUTPATIENT)
Dept: FAMILY MEDICINE | Facility: CLINIC | Age: 47
End: 2017-08-22
Payer: MEDICARE

## 2017-08-22 DIAGNOSIS — F20.9 SCHIZOPHRENIA, UNSPECIFIED TYPE (H): ICD-10-CM

## 2017-08-22 PROCEDURE — 96372 THER/PROPH/DIAG INJ SC/IM: CPT

## 2017-08-22 PROCEDURE — 99207 ZZC NO CHARGE NURSE ONLY: CPT

## 2017-08-22 NOTE — MR AVS SNAPSHOT
After Visit Summary   8/22/2017    Doreen Gramajo    MRN: [id number]           Patient Information     Date Of Birth          1970        Visit Information        Provider Department      8/22/2017 3:45 PM [place] PI CMA/LPN [facility]        Today's Diagnoses     Schizophrenia, unspecified type (H)           Follow-ups after your visit        Your next 10 appointments already scheduled     Sep 12, 2017 10:15 AM CDT   Nurse Only with [place] PI CMA/LPN   [facility]    [address] [address]   [phone]            Sep 12, 2017 10:30 AM CDT   LAB with PI LAB   [facility] ([facility]    [address] [address]   [phone]           Patient must bring picture ID. Patient should be prepared to give a urine specimen  Please do not eat 10-12 hours before your appointment if you are coming in fasting for labs on lipids, cholesterol, or glucose (sugar). Pregnant women should follow their Care Team instructions. Water with medications is okay. Do not drink coffee or other fluids. If you have concerns about taking  your medications, please ask at office or if scheduling via HubCast, send a message by clicking on Secure Messaging, Message Your Care Team.            Oct 03, 2017 10:15 AM CDT   Nurse Only with [place] PI CMA/LPN   [facility]    [address] [address]   [phone]            Oct 10, 2017  9:00 AM CDT   LAB with PI LAB   [facility]    [address] [address]   [phone]           Patient must bring picture ID. Patient should be prepared to give a urine specimen  Please do not eat 10-12 hours before your appointment if you are coming in fasting for labs on lipids, cholesterol, or glucose (sugar). Pregnant women should follow their  "Care Team instructions. Water with medications is okay. Do not drink coffee or other fluids. If you have concerns about taking  your medications, please ask at office or if scheduling via [employer], send a message by clicking on Secure Messaging, Message Your Care Team.              Who to contact     If you have questions or need follow up information about today's clinic visit or your schedule please contact [facility] directly at [phone].  Normal or non-critical lab and imaging results will be communicated to you by CarHound[employer], letter or phone within 4 business days after the clinic has received the results. If you do not hear from us within 7 days, please contact the clinic through [employer] or phone. If you have a critical or abnormal lab result, we will notify you by phone as soon as possible.  Submit refill requests through [employer] or call your pharmacy and they will forward the refill request to us. Please allow 3 business days for your refill to be completed.          Additional Information About Your Visit        [employer] Information     [employer] lets you send messages to your doctor, view your test results, renew your prescriptions, schedule appointments and more. To sign up, go to [email or web address] . Click on \"Log in\" on the left side of the screen, which will take you to the Welcome page. Then click on \"Sign up Now\" on the right side of the page.     You will be asked to enter the access code listed below, as well as some personal information. Please follow the directions to create your username and password.     Your access code is: [email or web address]  Expires: 2017  9:42 AM     Your access code will  in 90 days. If you need help or a new code, please call your [facility] or [phone].        Care EveryWhere ID     This is your Care EveryWhere ID. This could be used by other organizations to access your [place] medical records  [id number]         Blood Pressure from " Last 3 Encounters:   07/18/17 124/68   05/26/17 118/70   01/31/17 97/71    Weight from Last 3 Encounters:   07/18/17 154 lb (69.9 kg)   05/26/17 171 lb (77.6 kg)   01/31/17 165 lb (74.8 kg)              We Performed the Following     HALOPERIDOL DECANOATE INJ     INJECTION INTRAMUSCULAR OR SUB-Q        Primary Care Provider Office Phone # Fax #    Josey Bonilla, [facility] [phone]       [address] [address]        Equal Access to Services     [facility]: Hadii aad ku hadasho Soomaali, waaxda luqadaha, qaybta kaalmada reid, alise gutierrez . So [facility] [phone].    ATENCIÓN: Si habla español, tiene a mariano disposición servicios gratuitos de asistencia lingüística. Llame al [phone].    We comply with applicable federal civil rights laws and [place] laws. We do not discriminate on the basis of race, color, national origin, age, disability sex, sexual orientation or gender identity.            Thank you!     Thank you for choosing [facility]  for your care. Our goal is always to provide you with excellent care. Hearing back from our patients is one way we can continue to improve our services. Please take a few minutes to complete the written survey that you may receive in the mail after your visit with us. Thank you!             Your Updated Medication List - Protect others around you: Learn how to safely use, store and throw away your medicines at www.disposemymeds.org.          This list is accurate as of: 8/22/17  6:44 PM.  Always use your most recent med list.                   Brand Name Dispense Instructions for use Diagnosis    albuterol 108 (90 BASE) MCG/ACT Inhaler    PROAIR HFA/PROVENTIL HFA/VENTOLIN HFA    1 Inhaler    Inhale 2 puffs into the lungs every 6 hours (PRN for shortness of breath)    Asthma, intermittent, uncomplicated       atropine 0.1 mg/mL Susp suspension      Take by mouth At Bedtime        Clozapine  200 MG tablet    CLOZARIL     100 mg at breakfast and 650 mg after dinner        DEPEND UNDERGARMENTS Misc     31 each    1 each daily as needed MEDIUM sized briefs    Urinary incontinence, unspecified type       DIPHENDRYL PO      Take 50 mg by mouth every 3 hours as needed        ferrous sulfate 325 (65 FE) MG tablet    IRON    60 tablet    Take 1 tablet (325 mg) by mouth 2 times daily    Iron deficiency       haloperidol decanoate 100 MG/ML injection    HALDOL DECANOATE    1 mL    Inject 100 mg into the muscle every 21 days Order scanned into [facility], Order from Jody Schoenecker ([facility]) ph:[phone] Received on 04/11/2017. Refills 11.    Schizophrenia, unspecified type (H), Bipolar affective disorder, remission status unspecified (H)       * HALOPERIDOL PO      Take 5 mg by mouth every 3 hours as needed for agitation (up to three daily)        * HALOPERIDOL PO      Take 10 mg by mouth At Bedtime        IBUPROFEN PO      Take 400 mg by mouth every 4 hours as needed for moderate pain        levothyroxine 50 MCG tablet    SYNTHROID/LEVOTHROID    30 tablet    Take 1 tablet (50 mcg) by mouth daily    Hypothyroidism, unspecified type       LEXAPRO PO      Take 20 mg by mouth daily        lithium 300 MG tablet      Take 300 mg by mouth 2 times daily        LORAZEPAM PO      Take 1 mg by mouth every 3 hours as needed for anxiety        multivitamin, therapeutic with minerals Tabs tablet     90 each    Take 1 tablet by mouth daily    Schizoaffective disorder, unspecified type (H)       * order for DME     1 each    INCONTINENT PRODUCTS (UP / MONTH)    Chronic constipation, Schizophrenia, unspecified type (H), Schizoaffective disorder, unspecified type (H), Disturbance of conduct, Extrapyramidal symptom, Urinary incontinence, unspecified type, Bipolar affective disorder, remission status unspecified (H)       * order for DME     1 each    GLOVES    Chronic constipation, Schizophrenia,  unspecified type (H), Schizoaffective disorder, unspecified type (H), Disturbance of conduct, Extrapyramidal symptom, Urinary incontinence, unspecified type, Bipolar affective disorder, remission status unspecified (H)       * order for DME     1 each    INCONTINENT PERSONAL WIPES    Chronic constipation, Schizophrenia, unspecified type (H), Schizoaffective disorder, unspecified type (H), Disturbance of conduct, Extrapyramidal symptom, Urinary incontinence, unspecified type, Bipolar affective disorder, remission status unspecified (H)       * order for DME     1 each    CHUX PADS    Chronic constipation, Schizophrenia, unspecified type (H), Schizoaffective disorder, unspecified type (H), Disturbance of conduct, Extrapyramidal symptom, Urinary incontinence, unspecified type, Bipolar affective disorder, remission status unspecified (H)       pantoprazole 20 MG EC tablet    PROTONIX    30 tablet    Take 1 tablet (20 mg) by mouth daily (Reduced dosage to 20 mg)    Gastroesophageal reflux disease without esophagitis       polyethylene glycol powder    MIRALAX    510 g    Take 17 g (1 capful) by mouth daily    Chronic constipation       sennosides 8.6 MG tablet    SENOKOT    180 tablet    Take 3 tablets by mouth 2 times daily    Chronic constipation       simethicone 80 MG chewable tablet    MYLICON    180 tablet    Take 1 tablet (80 mg) by mouth every 6 hours as needed for flatulence or cramping PRN    Flatulence, eructation and gas pain       TRAZODONE HCL PO      Take 200 mg by mouth At Bedtime        vitamin B complex with vitamin C Tabs tablet     30 tablet    Take 1 tablet by mouth daily    Iron deficiency       * Notice:  This list has 6 medication(s) that are the same as other medications prescribed for you. Read the directions carefully, and ask your doctor or other care provider to review them with you.

## 2017-09-11 ENCOUNTER — ALLIED HEALTH/NURSE VISIT (OUTPATIENT)
Dept: FAMILY MEDICINE | Facility: CLINIC | Age: 47
End: 2017-09-11
Payer: MEDICARE

## 2017-09-11 DIAGNOSIS — F20.9 SCHIZOPHRENIA, UNSPECIFIED TYPE (H): ICD-10-CM

## 2017-09-11 PROCEDURE — 96372 THER/PROPH/DIAG INJ SC/IM: CPT

## 2017-09-11 PROCEDURE — 99207 ZZC NO CHARGE NURSE ONLY: CPT

## 2017-09-12 DIAGNOSIS — Z79.899 ENCOUNTER FOR LONG-TERM (CURRENT) USE OF MEDICATIONS: ICD-10-CM

## 2017-09-12 DIAGNOSIS — F20.0 PARANOID SCHIZOPHRENIA, SUBCHRONIC CONDITION (H): ICD-10-CM

## 2017-09-12 LAB
BASOPHILS # BLD AUTO: 0 10E9/L (ref 0–0.2)
BASOPHILS NFR BLD AUTO: 0.3 %
DIFFERENTIAL METHOD BLD: ABNORMAL
EOSINOPHIL # BLD AUTO: 0.2 10E9/L (ref 0–0.7)
EOSINOPHIL NFR BLD AUTO: 1.7 %
ERYTHROCYTE [DISTWIDTH] IN BLOOD BY AUTOMATED COUNT: 14.8 % (ref 10–15)
HCT VFR BLD AUTO: 35.3 % (ref 35–47)
HGB BLD-MCNC: 11.4 G/DL (ref 11.7–15.7)
LYMPHOCYTES # BLD AUTO: 1.6 10E9/L (ref 0.8–5.3)
LYMPHOCYTES NFR BLD AUTO: 16.1 %
MCH RBC QN AUTO: 32.9 PG (ref 26.5–33)
MCHC RBC AUTO-ENTMCNC: 32.3 G/DL (ref 31.5–36.5)
MCV RBC AUTO: 102 FL (ref 78–100)
MONOCYTES # BLD AUTO: 0.7 10E9/L (ref 0–1.3)
MONOCYTES NFR BLD AUTO: 6.6 %
NEUTROPHILS # BLD AUTO: 7.6 10E9/L (ref 1.6–8.3)
NEUTROPHILS NFR BLD AUTO: 75.3 %
PLATELET # BLD AUTO: 353 10E9/L (ref 150–450)
RBC # BLD AUTO: 3.47 10E12/L (ref 3.8–5.2)
WBC # BLD AUTO: 10.1 10E9/L (ref 4–11)

## 2017-09-12 PROCEDURE — 85025 COMPLETE CBC W/AUTO DIFF WBC: CPT | Performed by: NURSE PRACTITIONER

## 2017-09-12 PROCEDURE — 36415 COLL VENOUS BLD VENIPUNCTURE: CPT | Performed by: NURSE PRACTITIONER

## 2017-10-03 ENCOUNTER — ALLIED HEALTH/NURSE VISIT (OUTPATIENT)
Dept: FAMILY MEDICINE | Facility: CLINIC | Age: 47
End: 2017-10-03
Payer: MEDICARE

## 2017-10-03 DIAGNOSIS — Z79.899 LITHIUM USE: Primary | ICD-10-CM

## 2017-10-03 DIAGNOSIS — F20.9 SCHIZOPHRENIA, UNSPECIFIED TYPE (H): ICD-10-CM

## 2017-10-03 DIAGNOSIS — F91.9 DISRUPTIVE BEHAVIOR DISORDER: ICD-10-CM

## 2017-10-03 PROCEDURE — 99207 ZZC NO CHARGE NURSE ONLY: CPT

## 2017-10-03 PROCEDURE — 96372 THER/PROPH/DIAG INJ SC/IM: CPT

## 2017-10-03 NOTE — MR AVS SNAPSHOT
After Visit Summary   10/3/2017    Doreen Gramajo    MRN: [id number]           Patient Information     Date Of Birth          1970        Visit Information        Provider Department      10/3/2017 10:15 AM [place] PI GREY/LPN [facility]        Today's Diagnoses     Disruptive behavior disorder        Schizophrenia, unspecified type (H)           Follow-ups after your visit        Your next 10 appointments already scheduled     Oct 10, 2017  9:00 AM CDT   LAB with [facility] LAB   [facility]    [address] [address]   [phone]           Patient must bring picture ID. Patient should be prepared to give a urine specimen  Please do not eat 10-12 hours before your appointment if you are coming in fasting for labs on lipids, cholesterol, or glucose (sugar). Pregnant women should follow their Care Team instructions. Water with medications is okay. Do not drink coffee or other fluids. If you have concerns about taking  your medications, please ask at office or if scheduling via [employer], send a message by clicking on Secure Messaging, Message Your Care Team.              Who to contact     If you have questions or need follow up information about today's clinic visit or your schedule please contact [facility] directly at [phone].  Normal or non-critical lab and imaging results will be communicated to you by MyChart, letter or phone within 4 business days after the clinic has received the results. If you do not hear from us within 7 days, please contact the clinic through MyChart or phone. If you have a critical or abnormal lab result, we will notify you by phone as soon as possible.  Submit refill requests through [employer] or call your pharmacy and they will forward the refill request to us. Please allow 3 business days for your refill to be completed.          Additional Information About Your Visit       "  MyChart Information     [employer] lets you send messages to your doctor, view your test results, renew your prescriptions, schedule appointments and more. To sign up, go to [email or web address] . Click on \"Log in\" on the left side of the screen, which will take you to the Welcome page. Then click on \"Sign up Now\" on the right side of the page.     You will be asked to enter the access code listed below, as well as some personal information. Please follow the directions to create your username and password.     Your access code is: [email or web address]  Expires: 12/10/2017  4:16 PM     Your access code will  in 90 days. If you need help or a new code, please call your [place] clinic or [phone].        Care EveryWhere ID     This is your Care EveryWhere ID. This could be used by other organizations to access your [place] medical records  [id number]         Blood Pressure from Last 3 Encounters:   17 124/68   17 118/70   17 97/71    Weight from Last 3 Encounters:   17 154 lb (69.9 kg)   17 171 lb (77.6 kg)   17 165 lb (74.8 kg)              We Performed the Following     HALOPERIDOL DECANOATE INJ     INJECTION INTRAMUSCULAR OR SUB-Q        Primary Care Provider Office Phone # Fax #    Josey Lawton Chad, [facility] [phone]       [address] [address]        Equal Access to Services     [facility]: Hadii aad ku hadasho Soomaali, waaxda luqadaha, qaybta kaalmada adeegyada, waxay lauro gutierrez . So [facility] [phone].    ATENCIÓN: Si habla español, tiene a mariano disposición servicios gratuitos de asistencia lingüística. Llame al [phone].    We comply with applicable federal civil rights laws and [place] laws. We do not discriminate on the basis of race, color, national origin, age, disability, sex, sexual orientation, or gender identity.            Thank you!     Thank you for choosing [facility]  " for your care. Our goal is always to provide you with excellent care. Hearing back from our patients is one way we can continue to improve our services. Please take a few minutes to complete the written survey that you may receive in the mail after your visit with us. Thank you!             Your Updated Medication List - Protect others around you: Learn how to safely use, store and throw away your medicines at www.disposemymeds.org.          This list is accurate as of: 10/3/17 11:01 AM.  Always use your most recent med list.                   Brand Name Dispense Instructions for use Diagnosis    albuterol 108 (90 BASE) MCG/ACT Inhaler    PROAIR HFA/PROVENTIL HFA/VENTOLIN HFA    1 Inhaler    Inhale 2 puffs into the lungs every 6 hours (PRN for shortness of breath)    Asthma, intermittent, uncomplicated       atropine 0.1 mg/mL Susp suspension      Take by mouth At Bedtime        Clozapine 200 MG tablet    CLOZARIL     100 mg at breakfast and 650 mg after dinner        DEPEND UNDERGARMENTS Misc     31 each    1 each daily as needed MEDIUM sized briefs    Urinary incontinence, unspecified type       DIPHENDRYL PO      Take 50 mg by mouth every 3 hours as needed        ferrous sulfate 325 (65 FE) MG tablet    IRON    60 tablet    Take 1 tablet (325 mg) by mouth 2 times daily    Iron deficiency       haloperidol decanoate 100 MG/ML injection    HALDOL DECANOATE    1 mL    Inject 100 mg into the muscle every 21 days Order scanned into [facility], Order from Jody Schoenecker ([facility]) ph:[phone] Received on 04/11/2017. Refills 11.    Schizophrenia, unspecified type (H), Bipolar affective disorder, remission status unspecified (H)       * HALOPERIDOL PO      Take 5 mg by mouth every 3 hours as needed for agitation (up to three daily)        * HALOPERIDOL PO      Take 10 mg by mouth At Bedtime        IBUPROFEN PO      Take 400 mg by mouth every 4 hours as needed for moderate pain        levothyroxine  50 MCG tablet    SYNTHROID/LEVOTHROID    30 tablet    Take 1 tablet (50 mcg) by mouth daily    Hypothyroidism, unspecified type       LEXAPRO PO      Take 20 mg by mouth daily        lithium 300 MG tablet      Take 300 mg by mouth 2 times daily        LORAZEPAM PO      Take 1 mg by mouth every 3 hours as needed for anxiety        multivitamin, therapeutic with minerals Tabs tablet     90 each    Take 1 tablet by mouth daily    Schizoaffective disorder, unspecified type (H)       * order for DME     1 each    INCONTINENT PRODUCTS (UP / MONTH)    Chronic constipation, Schizophrenia, unspecified type (H), Schizoaffective disorder, unspecified type (H), Disturbance of conduct, Extrapyramidal symptom, Urinary incontinence, unspecified type, Bipolar affective disorder, remission status unspecified (H)       * order for DME     1 each    GLOVES    Chronic constipation, Schizophrenia, unspecified type (H), Schizoaffective disorder, unspecified type (H), Disturbance of conduct, Extrapyramidal symptom, Urinary incontinence, unspecified type, Bipolar affective disorder, remission status unspecified (H)       * order for DME     1 each    INCONTINENT PERSONAL WIPES    Chronic constipation, Schizophrenia, unspecified type (H), Schizoaffective disorder, unspecified type (H), Disturbance of conduct, Extrapyramidal symptom, Urinary incontinence, unspecified type, Bipolar affective disorder, remission status unspecified (H)       * order for DME     1 each    CHUX PADS    Chronic constipation, Schizophrenia, unspecified type (H), Schizoaffective disorder, unspecified type (H), Disturbance of conduct, Extrapyramidal symptom, Urinary incontinence, unspecified type, Bipolar affective disorder, remission status unspecified (H)       pantoprazole 20 MG EC tablet    PROTONIX    30 tablet    Take 1 tablet (20 mg) by mouth daily (Reduced dosage to 20 mg)    Gastroesophageal reflux disease without esophagitis       polyethylene glycol  powder    MIRALAX    510 g    Take 17 g (1 capful) by mouth daily    Chronic constipation       sennosides 8.6 MG tablet    SENOKOT    180 tablet    Take 3 tablets by mouth 2 times daily    Chronic constipation       simethicone 80 MG chewable tablet    MYLICON    180 tablet    Take 1 tablet (80 mg) by mouth every 6 hours as needed for flatulence or cramping PRN    Flatulence, eructation and gas pain       TRAZODONE HCL PO      Take 200 mg by mouth At Bedtime        vitamin B complex with vitamin C Tabs tablet     30 tablet    Take 1 tablet by mouth daily    Iron deficiency       * Notice:  This list has 6 medication(s) that are the same as other medications prescribed for you. Read the directions carefully, and ask your doctor or other care provider to review them with you.

## 2017-10-10 DIAGNOSIS — Z79.899 ENCOUNTER FOR LONG-TERM (CURRENT) USE OF MEDICATIONS: ICD-10-CM

## 2017-10-10 DIAGNOSIS — F20.0 PARANOID SCHIZOPHRENIA, SUBCHRONIC CONDITION (H): ICD-10-CM

## 2017-10-10 DIAGNOSIS — Z79.899 LITHIUM USE: ICD-10-CM

## 2017-10-10 LAB
BASOPHILS # BLD AUTO: 0 10E9/L (ref 0–0.2)
BASOPHILS NFR BLD AUTO: 0.3 %
DIFFERENTIAL METHOD BLD: ABNORMAL
EOSINOPHIL # BLD AUTO: 0.1 10E9/L (ref 0–0.7)
EOSINOPHIL NFR BLD AUTO: 0.9 %
ERYTHROCYTE [DISTWIDTH] IN BLOOD BY AUTOMATED COUNT: 14 % (ref 10–15)
HCT VFR BLD AUTO: 35.7 % (ref 35–47)
HGB BLD-MCNC: 11.6 G/DL (ref 11.7–15.7)
LITHIUM SERPL-SCNC: 0.9 MMOL/L (ref 0.6–1.2)
LYMPHOCYTES # BLD AUTO: 1.9 10E9/L (ref 0.8–5.3)
LYMPHOCYTES NFR BLD AUTO: 21.7 %
MCH RBC QN AUTO: 32.7 PG (ref 26.5–33)
MCHC RBC AUTO-ENTMCNC: 32.5 G/DL (ref 31.5–36.5)
MCV RBC AUTO: 101 FL (ref 78–100)
MONOCYTES # BLD AUTO: 0.6 10E9/L (ref 0–1.3)
MONOCYTES NFR BLD AUTO: 6.8 %
NEUTROPHILS # BLD AUTO: 6 10E9/L (ref 1.6–8.3)
NEUTROPHILS NFR BLD AUTO: 70.3 %
PLATELET # BLD AUTO: 402 10E9/L (ref 150–450)
RBC # BLD AUTO: 3.55 10E12/L (ref 3.8–5.2)
TSH SERPL DL<=0.005 MIU/L-ACNC: 3.02 MU/L (ref 0.4–4)
WBC # BLD AUTO: 8.6 10E9/L (ref 4–11)

## 2017-10-10 PROCEDURE — 85025 COMPLETE CBC W/AUTO DIFF WBC: CPT | Performed by: NURSE PRACTITIONER

## 2017-10-10 PROCEDURE — 36415 COLL VENOUS BLD VENIPUNCTURE: CPT | Performed by: NURSE PRACTITIONER

## 2017-10-10 PROCEDURE — 80178 ASSAY OF LITHIUM: CPT | Performed by: NURSE PRACTITIONER

## 2017-10-10 PROCEDURE — 84443 ASSAY THYROID STIM HORMONE: CPT | Performed by: NURSE PRACTITIONER

## 2017-10-23 DIAGNOSIS — E61.1 IRON DEFICIENCY: ICD-10-CM

## 2017-10-23 DIAGNOSIS — K21.9 GASTROESOPHAGEAL REFLUX DISEASE WITHOUT ESOPHAGITIS: ICD-10-CM

## 2017-10-23 RX ORDER — PANTOPRAZOLE SODIUM 20 MG/1
TABLET, DELAYED RELEASE ORAL
Qty: 30 TABLET | Refills: 3 | Status: SHIPPED | OUTPATIENT
Start: 2017-10-23 | End: 2018-01-15

## 2017-10-23 RX ORDER — MULTIVITAMIN WITH IRON
TABLET ORAL
Qty: 30 TABLET | Refills: 11 | Status: SHIPPED | OUTPATIENT
Start: 2017-10-23 | End: 2018-07-26

## 2017-10-24 ENCOUNTER — ALLIED HEALTH/NURSE VISIT (OUTPATIENT)
Dept: FAMILY MEDICINE | Facility: CLINIC | Age: 47
End: 2017-10-24
Payer: MEDICARE

## 2017-10-24 DIAGNOSIS — F91.9 DISRUPTIVE BEHAVIOR DISORDER: ICD-10-CM

## 2017-10-24 DIAGNOSIS — F20.9 SCHIZOPHRENIA, UNSPECIFIED TYPE (H): ICD-10-CM

## 2017-10-24 PROCEDURE — 99207 ZZC NO CHARGE NURSE ONLY: CPT

## 2017-10-24 PROCEDURE — 96372 THER/PROPH/DIAG INJ SC/IM: CPT

## 2017-11-07 DIAGNOSIS — Z79.899 ENCOUNTER FOR LONG-TERM (CURRENT) USE OF MEDICATIONS: ICD-10-CM

## 2017-11-07 DIAGNOSIS — F20.0 PARANOID SCHIZOPHRENIA, SUBCHRONIC CONDITION (H): ICD-10-CM

## 2017-11-07 LAB
BASOPHILS # BLD AUTO: 0 10E9/L (ref 0–0.2)
BASOPHILS NFR BLD AUTO: 0.3 %
DIFFERENTIAL METHOD BLD: ABNORMAL
EOSINOPHIL # BLD AUTO: 0 10E9/L (ref 0–0.7)
EOSINOPHIL NFR BLD AUTO: 0.1 %
ERYTHROCYTE [DISTWIDTH] IN BLOOD BY AUTOMATED COUNT: 14 % (ref 10–15)
HCT VFR BLD AUTO: 37.9 % (ref 35–47)
HGB BLD-MCNC: 12.3 G/DL (ref 11.7–15.7)
LYMPHOCYTES # BLD AUTO: 1.9 10E9/L (ref 0.8–5.3)
LYMPHOCYTES NFR BLD AUTO: 20.9 %
MCH RBC QN AUTO: 33.1 PG (ref 26.5–33)
MCHC RBC AUTO-ENTMCNC: 32.5 G/DL (ref 31.5–36.5)
MCV RBC AUTO: 102 FL (ref 78–100)
MONOCYTES # BLD AUTO: 0.6 10E9/L (ref 0–1.3)
MONOCYTES NFR BLD AUTO: 7 %
NEUTROPHILS # BLD AUTO: 6.5 10E9/L (ref 1.6–8.3)
NEUTROPHILS NFR BLD AUTO: 71.7 %
PLATELET # BLD AUTO: 376 10E9/L (ref 150–450)
RBC # BLD AUTO: 3.72 10E12/L (ref 3.8–5.2)
WBC # BLD AUTO: 9 10E9/L (ref 4–11)

## 2017-11-07 PROCEDURE — 85025 COMPLETE CBC W/AUTO DIFF WBC: CPT | Performed by: NURSE PRACTITIONER

## 2017-11-07 PROCEDURE — 36415 COLL VENOUS BLD VENIPUNCTURE: CPT | Performed by: NURSE PRACTITIONER

## 2017-11-14 ENCOUNTER — OFFICE VISIT (OUTPATIENT)
Dept: FAMILY MEDICINE | Facility: CLINIC | Age: 47
End: 2017-11-14
Payer: MEDICARE

## 2017-11-14 VITALS
HEART RATE: 96 BPM | TEMPERATURE: 97.8 F | OXYGEN SATURATION: 100 % | RESPIRATION RATE: 20 BRPM | HEIGHT: 62 IN | WEIGHT: 139 LBS | SYSTOLIC BLOOD PRESSURE: 104 MMHG | DIASTOLIC BLOOD PRESSURE: 68 MMHG | BODY MASS INDEX: 25.58 KG/M2

## 2017-11-14 DIAGNOSIS — R09.81 NASAL CONGESTION: Primary | ICD-10-CM

## 2017-11-14 DIAGNOSIS — F91.9 DISRUPTIVE BEHAVIOR DISORDER: ICD-10-CM

## 2017-11-14 DIAGNOSIS — F20.9 SCHIZOPHRENIA, UNSPECIFIED TYPE (H): ICD-10-CM

## 2017-11-14 DIAGNOSIS — S39.012A BACK STRAIN, INITIAL ENCOUNTER: ICD-10-CM

## 2017-11-14 PROCEDURE — 99214 OFFICE O/P EST MOD 30 MIN: CPT | Mod: 25 | Performed by: NURSE PRACTITIONER

## 2017-11-14 PROCEDURE — 96372 THER/PROPH/DIAG INJ SC/IM: CPT | Performed by: NURSE PRACTITIONER

## 2017-11-14 NOTE — PATIENT INSTRUCTIONS
1. Nasal congestion  Acute  -Follow instructions below  -Use an over-the-counter antihistamine like zyrtec, allegra, claritin to help reduce runny nose    2. Back strain, initial encounter  Chronic, stablae  - PHYSICAL THERAPY REFERRAL      Nasal congestion    The sinuses are air-filled spaces within the bones of the face. They connect to the inside of the nose. Sinusitis is an inflammation of the tissue lining the sinus cavity. Sinus inflammation can occur during a cold or hay-fever (allergies to pollens and other particles in the air) and cause symptoms of sinus congestion and fullness and perhaps a low-grade fever. These symptoms can last up to 7-10 days. This does not require antibiotic treatment.  Home Care:  1. Drink plenty of water, hot tea, and other liquids to stay well hydrated. This thins the mucus and promotes sinus drainage.  2. Apply heat to the painful areas of the face. Use a towel soaked in hot water. Or,  the shower and direct the hot spray onto your face. This is a good way to inhale warm water vapor and get heat on your face at the same time. (Cover your mouth and nose with your hands so you can still breathe as you do this.)  3. Use a vaporizer with products such as Vicks VapoRub (contains menthol) at night. Suck on peppermint, menthol or eucalyptus hard candies during the day.  4. An expectorant containing guaifenesin (such as Robitussin), helps to thin the mucus and promote drainage from the sinuses.  5. Over-the-counter decongestants may be used unless a similar medicine was prescribed. Nasal sprays work the fastest. Use one that contains phenylephrine (Rik-synephrine, Sinex, Flonase or others). First blow the nose gently to remove mucus, then apply the drops. Do not use these medicines more often than directed on the label or for more than three days or symptoms may worsen. You may also use tablets containing pseudoephedrine (Sudafed). Many sinus remedies combine ingredients, which  may increase side effects. Read the labels or ask the pharmacist for help. NOTE: Persons with high blood pressure should not use decongestants. They can raise blood pressure.  Look at CORICIDIN products instead.  6. Antihistamines are useful if allergies are a cause of your sinusitis. The mildest one is chlorpheniramine (available without a prescription). The dose for adults is 8-12mg three times a day. [NOTE: Do not use chlorpheniramine if you have glaucoma or if you are a man with trouble urinating due to an enlarged prostate.] Claritin (loratidine) is an antihistamine that causes less drowsiness and is a good alternative for daytime use.  7. When allergies are the cause for sinusitis, a saline nasal rinse alone may give relief. Saline nasal rinse reduces swelling and clears excess mucus. This allows sinuses to drain. Pre-packaged cans are available at most drug stores. These contain pre-mixed salt in an irrigation device.  8. You may use acetaminophen (Tylenol) or ibuprofen (Motrin, Advil) to control pain, unless another pain medicine was prescribed. [ NOTE: If you have chronic liver or kidney disease or ever had a stomach ulcer, talk with your doctor before using these medicines.] (Aspirin should never be used in anyone under 18 years of age who is ill with a fever. It may cause severe liver damage.)  9. Using a neti pot, or pre-mixed nasal saline rinse can daily. This will help clear your sinuses of mucous:  Use Saltwater Rinses  Rinses help keep your sinuses and nose moist. Mix a teaspoon of salt in eight ounces of bottled, water. Use a bulb syringe to gently squirt the water into your nose a few times a day. You can also buy pre-mixed saline nasal sprays (i.e. Neti Pot, Saline spray, Sinus rinse). Daily rinses will be helpful. Humidification will also help- steam your head for 10 minutes, take a steam shower for 10 minutes, and ensure your dwelling has sufficient humidification.  Medications  Your doctor may  prescribe medications to help treat your sinusitis. If you have an infection, antibiotics can help clear it up. If you are prescribed antibiotics, take all pills on schedule until they are gone, even if you feel better. Decongestants help relieve swelling. Use decongestant sprays for short periods only under the direction of your doctor. If you have allergies, your doctor may prescribe medications to help relieve them.   Decongestants- over-the-counter Pseudophedrine  Nasal corticosteroid- Nasonex, Flonase  Antihistamine- over-the-counter Claritin, Allegra, Zyrtec etc...         To use the neti pot, tilt your head sideways over the sink and place the spout of the neti pot in the upper nostril. Breathing through your open mouth, gently pour the saltwater solution into your upper nostril so that the liquid drains through the lower nostril. Repeat on the other side.    Be sure to rinse the irrigation device after each use with similarly distilled, sterile, previously boiled and cooled, or filtered water and leave open to air dry.    Neti pots are often available in pharmacies and health food stores, as well online.     Apply Hot or Cold Packs  Applying heat to the area surrounding your sinuses may make you feel more comfortable. Use a hot water bottle or a hand towel dipped in hot water. Some people also find ice packs effective for relieving pain.  Follow Up  with your health care provider, or this facility in one week or as instructed by our staff if not improving.  Get Prompt Medical Attention  if any of the following occur:    Green or yellow drainage from the nose or into the back of the throat (post-nasal drip)    Worsening sinus pain or headache    Stiff neck    Unusual drowsiness, confusion or not acting like your normal self    Swelling of the forehead or eyelids    Vision problems including blurred or double vision    Fever of 100.4 F (38 C) or higher, or as directed by your healthcare provider    Seizure     8969-3209 Pavithra Samuel, [address] [place] [address], [place] [address]. All rights reserved. This information is not intended as a substitute for professional medical care. Always follow your healthcare professional's instructions.

## 2017-11-14 NOTE — NURSING NOTE
"Chief Complaint   Patient presents with     Cough       Initial /68 (BP Location: Right arm, Patient Position: Sitting, Cuff Size: Adult Regular)  Pulse 96  Temp 97.8  F (36.6  C) (Tympanic)  Resp 20  Ht 5' 2\" (1.575 m)  Wt 139 lb (63 kg)  SpO2 100%  BMI 25.42 kg/m2 Estimated body mass index is 25.42 kg/(m^2) as calculated from the following:    Height as of this encounter: 5' 2\" (1.575 m).    Weight as of this encounter: 139 lb (63 kg).  Medication Reconciliation: complete    Health Maintenance that is potentially due pending provider review:  NONE    n/a    Is there anyone who you would like to be able to receive your results? Not Applicable  If yes have patient fill out WOODY    "

## 2017-11-14 NOTE — PROGRESS NOTES
SUBJECTIVE:   Doreen Gramajo is a 47 year old female who presents to clinic today for the following health issues:    Group home staff with her    RESPIRATORY SYMPTOMS      Duration: 2 weeks     Description  nasal congestion and cough    Severity: cough not resolving     Accompanying signs and symptoms: None    History (predisposing factors):  tobacco abuse trying to quit     Precipitating or alleviating factors: None    Therapies tried and outcome:  Robitussin and cough drops       She noted on the way out, that she works cleaning hotels, and has had some low back pain on/off. She is seen by Chiropractor who thought a back brace might be helpful. She denies any injury. She denies any prior Physical Therapy.     WOODY signed for Dr. Shoenecker, psychiatrist from [place]. She'll get her Haldol injection today    HPI:   PCP:  Josey Bonilla, [facility] [phone]    Patient Active Problem List   Diagnosis     Schizophrenia (H)     Hypothyroidism     Bipolar affective (H)     Asthma, intermittent     Gastroesophageal reflux disease without esophagitis     CARDIOVASCULAR SCREENING; LDL GOAL LESS THAN 160     Health Care Home     Psychosis     Behavior disturbance     Cigarette nicotine dependence with nicotine-induced disorder     Iron deficiency     Chronic constipation     Urinary incontinence, unspecified type     Borderline intellectual functioning     History of anorexia nervosa     Rectal prolapse     Extrapyramidal symptom     History of eating disorder     Disturbance of conduct     Current Outpatient Prescriptions   Medication     pantoprazole (PROTONIX) 20 MG EC tablet     B Complex-C TABS     order for DME     order for DME     order for DME     order for DME     Incontinence Supply Disposable (DEPEND UNDERGARMENTS) MISC     sennosides (SENOKOT) 8.6 MG tablet     cloZAPine (CLOZARIL) 200 MG tablet     levothyroxine (SYNTHROID/LEVOTHROID) 50 MCG tablet     DiphenhydrAMINE HCl (DIPHENDRYL PO)     LORAZEPAM  "PO     IBUPROFEN PO     polyethylene glycol (MIRALAX) powder     haloperidol decanoate (HALDOL DECANOATE) 100 MG/ML injection     albuterol (PROAIR HFA/PROVENTIL HFA/VENTOLIN HFA) 108 (90 BASE) MCG/ACT Inhaler     ferrous sulfate (IRON) 325 (65 FE) MG tablet     multivitamin, therapeutic with minerals (CERTAVITE/ANTIOXIDANTS) TABS tablet     simethicone (MYLICON) 80 MG chewable tablet     HALOPERIDOL PO     lithium 300 MG tablet     Escitalopram Oxalate (LEXAPRO PO)     TRAZODONE HCL PO     atropine 0.1 mg/mL     HALOPERIDOL PO     No current facility-administered medications for this visit.        Health Maintenance Due   Topic Date Due     INFLUENZA VACCINE (SYSTEM ASSIGNED)  09/01/2017       Reviewed and updated:  Tobacco  Allergies  Meds  Med Hx  Surg Hx  Fam Hx  Soc Hx     ROS:  Constitutional, HEENT, cardiovascular, pulmonary, gi and gu systems are negative, except as otherwise noted.  CONSTITUTIONAL:NEGATIVE for fever, chills, change in weight  EYES: NEGATIVE for vision changes or irritation  ENT/MOUTH: NEGATIVE for ear, mouth and throat problems, clear rhinorrhea  RESP:NEGATIVE for significant cough or SOB  CV: NEGATIVE for chest pain, palpitations or peripheral edema  MUSCULOSKELETAL: NEGATIVE for significant arthralgias or myalgia, occasional low back strain  PSYCHIATRIC: NEGATIVE for changes in mood or affect, history of psychiatric disorders      PHYSICAL EXAM:   /68 (BP Location: Right arm, Patient Position: Sitting, Cuff Size: Adult Regular)  Pulse 96  Temp 97.8  F (36.6  C) (Tympanic)  Resp 20  Ht 5' 2\" (1.575 m)  Wt 139 lb (63 kg)  SpO2 100%  BMI 25.42 kg/m2  Body mass index is 25.42 kg/(m^2).  GENERAL APPEARANCE: healthy, alert and no distress  HENT: ear canals and TM's normal and nose and mouth without ulcers or lesions, clear rhinorrhea  NECK: no adenopathy, no asymmetry, masses, or scars and thyroid normal to palpation  RESP: lungs clear to auscultation - no rales, rhonchi or " wheezes  CV: regular rates and rhythm, normal S1 S2, no S3 or S4 and no murmur, click or rub  MS: extremities normal- no gross deformities noted  PSYCH: mentation appears normal and affect normal/bright    ASSESSMENT & PLAN:     Haldol injection given in clinic today per Dr. Schoenecker's order    1. Nasal congestion  Acute  -Follow instructions below  -Use an over-the-counter antihistamine like zyrtec, allegra, claritin to help reduce runny nose    2. Back strain, initial encounter  Chronic, stablae  - PHYSICAL THERAPY REFERRAL      Nasal congestion    The sinuses are air-filled spaces within the bones of the face. They connect to the inside of the nose. Sinusitis is an inflammation of the tissue lining the sinus cavity. Sinus inflammation can occur during a cold or hay-fever (allergies to pollens and other particles in the air) and cause symptoms of sinus congestion and fullness and perhaps a low-grade fever. These symptoms can last up to 7-10 days. This does not require antibiotic treatment.  Home Care:  1. Drink plenty of water, hot tea, and other liquids to stay well hydrated. This thins the mucus and promotes sinus drainage.  2. Apply heat to the painful areas of the face. Use a towel soaked in hot water. Or,  the shower and direct the hot spray onto your face. This is a good way to inhale warm water vapor and get heat on your face at the same time. (Cover your mouth and nose with your hands so you can still breathe as you do this.)  3. Use a vaporizer with products such as Vicks VapoRub (contains menthol) at night. Suck on peppermint, menthol or eucalyptus hard candies during the day.  4. An expectorant containing guaifenesin (such as Robitussin), helps to thin the mucus and promote drainage from the sinuses.  5. Over-the-counter decongestants may be used unless a similar medicine was prescribed. Nasal sprays work the fastest. Use one that contains phenylephrine (Rik-synephrine, Sinex, Flonase or  others). First blow the nose gently to remove mucus, then apply the drops. Do not use these medicines more often than directed on the label or for more than three days or symptoms may worsen. You may also use tablets containing pseudoephedrine (Sudafed). Many sinus remedies combine ingredients, which may increase side effects. Read the labels or ask the pharmacist for help. NOTE: Persons with high blood pressure should not use decongestants. They can raise blood pressure.  Look at CORICIDIN products instead.  6. Antihistamines are useful if allergies are a cause of your sinusitis. The mildest one is chlorpheniramine (available without a prescription). The dose for adults is 8-12mg three times a day. [NOTE: Do not use chlorpheniramine if you have glaucoma or if you are a man with trouble urinating due to an enlarged prostate.] Claritin (loratidine) is an antihistamine that causes less drowsiness and is a good alternative for daytime use.  7. When allergies are the cause for sinusitis, a saline nasal rinse alone may give relief. Saline nasal rinse reduces swelling and clears excess mucus. This allows sinuses to drain. Pre-packaged cans are available at most drug stores. These contain pre-mixed salt in an irrigation device.  8. You may use acetaminophen (Tylenol) or ibuprofen (Motrin, Advil) to control pain, unless another pain medicine was prescribed. [ NOTE: If you have chronic liver or kidney disease or ever had a stomach ulcer, talk with your doctor before using these medicines.] (Aspirin should never be used in anyone under 18 years of age who is ill with a fever. It may cause severe liver damage.)  9. Using a neti pot, or pre-mixed nasal saline rinse can daily. This will help clear your sinuses of mucous:  Use Saltwater Rinses  Rinses help keep your sinuses and nose moist. Mix a teaspoon of salt in eight ounces of bottled, water. Use a bulb syringe to gently squirt the water into your nose a few times a day. You  can also buy pre-mixed saline nasal sprays (i.e. Neti Pot, Saline spray, Sinus rinse). Daily rinses will be helpful. Humidification will also help- steam your head for 10 minutes, take a steam shower for 10 minutes, and ensure your dwelling has sufficient humidification.  Medications  Your doctor may prescribe medications to help treat your sinusitis. If you have an infection, antibiotics can help clear it up. If you are prescribed antibiotics, take all pills on schedule until they are gone, even if you feel better. Decongestants help relieve swelling. Use decongestant sprays for short periods only under the direction of your doctor. If you have allergies, your doctor may prescribe medications to help relieve them.   Decongestants- over-the-counter Pseudophedrine  Nasal corticosteroid- Nasonex, Flonase  Antihistamine- over-the-counter Claritin, Allegra, Zyrtec etc...         To use the neti pot, tilt your head sideways over the sink and place the spout of the neti pot in the upper nostril. Breathing through your open mouth, gently pour the saltwater solution into your upper nostril so that the liquid drains through the lower nostril. Repeat on the other side.    Be sure to rinse the irrigation device after each use with similarly distilled, sterile, previously boiled and cooled, or filtered water and leave open to air dry.    Neti pots are often available in pharmacies and health food stores, as well online.     Apply Hot or Cold Packs  Applying heat to the area surrounding your sinuses may make you feel more comfortable. Use a hot water bottle or a hand towel dipped in hot water. Some people also find ice packs effective for relieving pain.  Follow Up  with your health care provider, or this facility in one week or as instructed by our staff if not improving.  Get Prompt Medical Attention  if any of the following occur:    Green or yellow drainage from the nose or into the back of the throat (post-nasal  drip)    Worsening sinus pain or headache    Stiff neck    Unusual drowsiness, confusion or not acting like your normal self    Swelling of the forehead or eyelids    Vision problems including blurred or double vision    Fever of 100.4 F (38 C) or higher, or as directed by your healthcare provider    Seizure    [id number] Pavithra Samuel, [address] [place] [id number]. All rights reserved. This information is not intended as a substitute for professional medical care. Always follow your healthcare professional's instructions.    Risks, benefits, side effects and rationale for treatment plan fully discussed with the patient and understanding expressed.    Josey Bonilla, [facility]

## 2017-11-14 NOTE — MR AVS SNAPSHOT
After Visit Summary   11/14/2017    Doreen Gramajo    MRN: [id number]           Patient Information     Date Of Birth          1970        Visit Information        Provider Department      11/14/2017 10:00 AM Josey Bonilla CNP [facility]        Today's Diagnoses     Nasal congestion    -  1    Back strain, initial encounter          Care Instructions    1. Nasal congestion  Acute  -Follow instructions below  -Use an over-the-counter antihistamine like zyrtec, allegra, claritin to help reduce runny nose    2. Back strain, initial encounter  Chronic, stablae  - PHYSICAL THERAPY REFERRAL      Nasal congestion    The sinuses are air-filled spaces within the bones of the face. They connect to the inside of the nose. Sinusitis is an inflammation of the tissue lining the sinus cavity. Sinus inflammation can occur during a cold or hay-fever (allergies to pollens and other particles in the air) and cause symptoms of sinus congestion and fullness and perhaps a low-grade fever. These symptoms can last up to 7-10 days. This does not require antibiotic treatment.  Home Care:  1. Drink plenty of water, hot tea, and other liquids to stay well hydrated. This thins the mucus and promotes sinus drainage.  2. Apply heat to the painful areas of the face. Use a towel soaked in hot water. Or,  the shower and direct the hot spray onto your face. This is a good way to inhale warm water vapor and get heat on your face at the same time. (Cover your mouth and nose with your hands so you can still breathe as you do this.)  3. Use a vaporizer with products such as Vicks VapoRub (contains menthol) at night. Suck on peppermint, menthol or eucalyptus hard candies during the day.  4. An expectorant containing guaifenesin (such as Robitussin), helps to thin the mucus and promote drainage from the sinuses.  5. Over-the-counter decongestants may be used unless a similar medicine was prescribed.  Nasal sprays work the fastest. Use one that contains phenylephrine (Rik-synephrine, Sinex, Flonase or others). First blow the nose gently to remove mucus, then apply the drops. Do not use these medicines more often than directed on the label or for more than three days or symptoms may worsen. You may also use tablets containing pseudoephedrine (Sudafed). Many sinus remedies combine ingredients, which may increase side effects. Read the labels or ask the pharmacist for help. NOTE: Persons with high blood pressure should not use decongestants. They can raise blood pressure.  Look at CORICIDIN products instead.  6. Antihistamines are useful if allergies are a cause of your sinusitis. The mildest one is chlorpheniramine (available without a prescription). The dose for adults is 8-12mg three times a day. [NOTE: Do not use chlorpheniramine if you have glaucoma or if you are a man with trouble urinating due to an enlarged prostate.] Claritin (loratidine) is an antihistamine that causes less drowsiness and is a good alternative for daytime use.  7. When allergies are the cause for sinusitis, a saline nasal rinse alone may give relief. Saline nasal rinse reduces swelling and clears excess mucus. This allows sinuses to drain. Pre-packaged cans are available at most drug stores. These contain pre-mixed salt in an irrigation device.  8. You may use acetaminophen (Tylenol) or ibuprofen (Motrin, Advil) to control pain, unless another pain medicine was prescribed. [ NOTE: If you have chronic liver or kidney disease or ever had a stomach ulcer, talk with your doctor before using these medicines.] (Aspirin should never be used in anyone under 18 years of age who is ill with a fever. It may cause severe liver damage.)  9. Using a neti pot, or pre-mixed nasal saline rinse can daily. This will help clear your sinuses of mucous:  Use Saltwater Rinses  Rinses help keep your sinuses and nose moist. Mix a teaspoon of salt in eight ounces  of bottled, water. Use a bulb syringe to gently squirt the water into your nose a few times a day. You can also buy pre-mixed saline nasal sprays (i.e. Neti Pot, Saline spray, Sinus rinse). Daily rinses will be helpful. Humidification will also help- steam your head for 10 minutes, take a steam shower for 10 minutes, and ensure your dwelling has sufficient humidification.  Medications  Your doctor may prescribe medications to help treat your sinusitis. If you have an infection, antibiotics can help clear it up. If you are prescribed antibiotics, take all pills on schedule until they are gone, even if you feel better. Decongestants help relieve swelling. Use decongestant sprays for short periods only under the direction of your doctor. If you have allergies, your doctor may prescribe medications to help relieve them.   Decongestants- over-the-counter Pseudophedrine  Nasal corticosteroid- Nasonex, Flonase  Antihistamine- over-the-counter Claritin, Allegra, Zyrtec etc...         To use the neti pot, tilt your head sideways over the sink and place the spout of the neti pot in the upper nostril. Breathing through your open mouth, gently pour the saltwater solution into your upper nostril so that the liquid drains through the lower nostril. Repeat on the other side.    Be sure to rinse the irrigation device after each use with similarly distilled, sterile, previously boiled and cooled, or filtered water and leave open to air dry.    Neti pots are often available in pharmacies and health food stores, as well online.     Apply Hot or Cold Packs  Applying heat to the area surrounding your sinuses may make you feel more comfortable. Use a hot water bottle or a hand towel dipped in hot water. Some people also find ice packs effective for relieving pain.  Follow Up  with your health care provider, or this facility in one week or as instructed by our staff if not improving.  Get Prompt Medical Attention  if any of the following  "occur:    Green or yellow drainage from the nose or into the back of the throat (post-nasal drip)    Worsening sinus pain or headache    Stiff neck    Unusual drowsiness, confusion or not acting like your normal self    Swelling of the forehead or eyelids    Vision problems including blurred or double vision    Fever of 100.4 F (38 C) or higher, or as directed by your healthcare provider    Seizure    [id number] [employer]ith[employer], [address] [place] [address], [place] [address]. All rights reserved. This information is not intended as a substitute for professional medical care. Always follow your healthcare professional's instructions.          Follow-ups after your visit        Additional Services     PHYSICAL THERAPY REFERRAL       *This therapy referral will be filtered to a centralized scheduling office at [facility] and the patient will receive a call to schedule an appointment at a [place] location most convenient for them. *     [facility] provides Physical Therapy evaluation and treatment and many specialty services across the [place] system.  If requesting a specialty program, please choose from the list below.    If you have not heard from the scheduling office within 2 business days, please call [phone] for all locations, with the exception of [place], please call [phone].  Treatment: Evaluation & Treatment  Special Instructions/Modalities: back strengthening exercises  Special Programs:     Please be aware that coverage of these services is subject to the terms and limitations of your health insurance plan.  Call member services at your health plan with any benefit or coverage questions.      **Note to Provider:  If you are referring outside of [place] for the therapy appointment, please list the name of the location in the \"special instructions\" above, print the referral and give to the patient to schedule the appointment.                  Your next 10 " "appointments already scheduled     Dec 05, 2017  9:00 AM CST   [facility]    [address] [id number] [phone]           Please do not eat 10-12 hours before your appointment if you are coming in fasting for labs on lipids, cholesterol, or glucose (sugar). This does not apply to pregnant women. Water, hot tea and black coffee (with nothing added) are okay. Do not drink other fluids, diet soda or chew gum.            Dec 05, 2017  9:30 AM CST   Nurse Only with [place] PI CMA/LPN   [facility]    [address] [id number] [phone]            Dec 26, 2017  5:00 PM CST   Nurse Only with [place] PI CMA/LPN   [facility]    [address] [id number] [phone]              Who to contact     If you have questions or need follow up information about today's clinic visit or your schedule please contact [facility] directly at [phone].  Normal or non-critical lab and imaging results will be communicated to you by MyChart, letter or phone within 4 business days after the clinic has received the results. If you do not hear from us within 7 days, please contact the clinic through [facility] or phone. If you have a critical or abnormal lab result, we will notify you by phone as soon as possible.  Submit refill requests through [employer] or call your pharmacy and they will forward the refill request to us. Please allow 3 business days for your refill to be completed.          Additional Information About Your Visit        MyChart Information     [employer] lets you send messages to your doctor, view your test results, renew your prescriptions, schedule appointments and more. To sign up, go to [email or web address] . Click on \"Log in\" on the left side of the screen, which will take you to the Welcome " "page. Then click on \"Sign up Now\" on the right side of the page.     You will be asked to enter the access code listed below, as well as some personal information. Please follow the directions to create your username and password.     Your access code is: [email or web address]  Expires: 12/10/2017  3:16 PM     Your access code will  in 90 days. If you need help or a new code, please call your [place] clinic or [phone].        Care EveryWhere ID     This is your Care EveryWhere ID. This could be used by other organizations to access your [place] medical records  [id number]        Your Vitals Were     Pulse Temperature Respirations Height Pulse Oximetry BMI (Body Mass Index)    96 97.8  F (36.6  C) (Tympanic) 20 5' 2\" (1.575 m) 100% 25.42 kg/m2       Blood Pressure from Last 3 Encounters:   17 104/68   17 124/68   17 118/70    Weight from Last 3 Encounters:   17 139 lb (63 kg)   17 154 lb (69.9 kg)   17 171 lb (77.6 kg)              We Performed the Following     PHYSICAL THERAPY REFERRAL        Primary Care Provider Office Phone # Fax #    Josey Leighann Bonilla, [facility] [phone]       [address] [facility] [address]        Equal Access to Services     [facility]: Hadii maxwell silvao Sobethany, waaxda luqadaha, qaybta kaalmada reid, alise gutierrez . So [facility] [phone].    ATENCIÓN: Si habla español, tiene a mariano disposición servicios gratuitos de asistencia lingüística. Llame al [phone].    We comply with applicable federal civil rights laws and [place] laws. We do not discriminate on the basis of race, color, national origin, age, disability, sex, sexual orientation, or gender identity.            Thank you!     Thank you for choosing [facility]  for your care. Our goal is always to provide you with excellent care. Hearing back from our patients is one way we can continue to improve our services. " Please take a few minutes to complete the written survey that you may receive in the mail after your visit with us. Thank you!             Your Updated Medication List - Protect others around you: Learn how to safely use, store and throw away your medicines at www.disposemymeds.org.          This list is accurate as of: 11/14/17 10:33 AM.  Always use your most recent med list.                   Brand Name Dispense Instructions for use Diagnosis    albuterol 108 (90 BASE) MCG/ACT Inhaler    PROAIR HFA/PROVENTIL HFA/VENTOLIN HFA    1 Inhaler    Inhale 2 puffs into the lungs every 6 hours (PRN for shortness of breath)    Asthma, intermittent, uncomplicated       atropine 0.1 mg/mL Susp suspension      Take by mouth At Bedtime        B Complex-C Tabs     30 tablet    Take 1 tablet by mouth daily    Iron deficiency       Clozapine 200 MG tablet    CLOZARIL     100 mg at breakfast and 650 mg after dinner        DEPEND UNDERGARMENTS Misc     31 each    1 each daily as needed MEDIUM sized briefs    Urinary incontinence, unspecified type       DIPHENDRYL PO      Take 50 mg by mouth every 3 hours as needed        ferrous sulfate 325 (65 FE) MG tablet    IRON    60 tablet    Take 1 tablet (325 mg) by mouth 2 times daily    Iron deficiency       haloperidol decanoate 100 MG/ML injection    HALDOL DECANOATE    1 mL    Inject 100 mg into the muscle every 21 days Order scanned into [facility], Order from Jody Schoenecker ([facility]) ph:[phone] Received on 04/11/2017. Refills 11.    Schizophrenia, unspecified type (H), Bipolar affective disorder, remission status unspecified (H)       * HALOPERIDOL PO      Take 5 mg by mouth every 3 hours as needed for agitation (up to three daily)        * HALOPERIDOL PO      Take 10 mg by mouth At Bedtime        IBUPROFEN PO      Take 400 mg by mouth every 4 hours as needed for moderate pain        levothyroxine 50 MCG tablet    SYNTHROID/LEVOTHROID    30 tablet    Take 1  tablet (50 mcg) by mouth daily    Hypothyroidism, unspecified type       LEXAPRO PO      Take 20 mg by mouth daily        lithium 300 MG tablet      Take 300 mg by mouth 2 times daily        LORAZEPAM PO      Take 1 mg by mouth every 3 hours as needed for anxiety        multivitamin, therapeutic with minerals Tabs tablet     90 each    Take 1 tablet by mouth daily    Schizoaffective disorder, unspecified type (H)       * order for DME     1 each    INCONTINENT PRODUCTS (UP / MONTH)    Chronic constipation, Schizophrenia, unspecified type (H), Schizoaffective disorder, unspecified type (H), Disturbance of conduct, Extrapyramidal symptom, Urinary incontinence, unspecified type, Bipolar affective disorder, remission status unspecified (H)       * order for DME     1 each    GLOVES    Chronic constipation, Schizophrenia, unspecified type (H), Schizoaffective disorder, unspecified type (H), Disturbance of conduct, Extrapyramidal symptom, Urinary incontinence, unspecified type, Bipolar affective disorder, remission status unspecified (H)       * order for DME     1 each    INCONTINENT PERSONAL WIPES    Chronic constipation, Schizophrenia, unspecified type (H), Schizoaffective disorder, unspecified type (H), Disturbance of conduct, Extrapyramidal symptom, Urinary incontinence, unspecified type, Bipolar affective disorder, remission status unspecified (H)       * order for DME     1 each    CHUX PADS    Chronic constipation, Schizophrenia, unspecified type (H), Schizoaffective disorder, unspecified type (H), Disturbance of conduct, Extrapyramidal symptom, Urinary incontinence, unspecified type, Bipolar affective disorder, remission status unspecified (H)       pantoprazole 20 MG EC tablet    PROTONIX    30 tablet    Take 1 tablet (20 mg) by mouth daily (Reduced dosage to 20 mg)    Gastroesophageal reflux disease without esophagitis       polyethylene glycol powder    MIRALAX    510 g    Take 17 g (1 capful) by mouth  daily    Chronic constipation       sennosides 8.6 MG tablet    SENOKOT    180 tablet    Take 3 tablets by mouth 2 times daily    Chronic constipation       simethicone 80 MG chewable tablet    MYLICON    180 tablet    Take 1 tablet (80 mg) by mouth every 6 hours as needed for flatulence or cramping PRN    Flatulence, eructation and gas pain       TRAZODONE HCL PO      Take 200 mg by mouth At Bedtime        * Notice:  This list has 6 medication(s) that are the same as other medications prescribed for you. Read the directions carefully, and ask your doctor or other care provider to review them with you.

## 2017-11-20 ENCOUNTER — TELEPHONE (OUTPATIENT)
Dept: FAMILY MEDICINE | Facility: CLINIC | Age: 47
End: 2017-11-20

## 2017-11-20 NOTE — TELEPHONE ENCOUNTER
Received records from [facility] Dr. Schoenecker. Given to Josey to review.    Meliza [place]

## 2017-11-21 ENCOUNTER — HOSPITAL ENCOUNTER (OUTPATIENT)
Dept: PHYSICAL THERAPY | Facility: CLINIC | Age: 47
Setting detail: THERAPIES SERIES
End: 2017-11-21
Attending: NURSE PRACTITIONER
Payer: MEDICARE

## 2017-11-21 PROCEDURE — G8984 CARRY CURRENT STATUS: HCPCS | Mod: GP,CJ | Performed by: PHYSICAL THERAPIST

## 2017-11-21 PROCEDURE — 97110 THERAPEUTIC EXERCISES: CPT | Mod: GP | Performed by: PHYSICAL THERAPIST

## 2017-11-21 PROCEDURE — 97161 PT EVAL LOW COMPLEX 20 MIN: CPT | Mod: GP | Performed by: PHYSICAL THERAPIST

## 2017-11-21 PROCEDURE — 40000718 ZZHC STATISTIC PT DEPARTMENT ORTHO VISIT: Performed by: PHYSICAL THERAPIST

## 2017-11-21 PROCEDURE — G8985 CARRY GOAL STATUS: HCPCS | Mod: GP,CI | Performed by: PHYSICAL THERAPIST

## 2017-11-21 NOTE — PROGRESS NOTES
[facility]          OUTPATIENT PHYSICAL THERAPY ORTHOPEDIC EVALUATION  PLAN OF TREATMENT FOR OUTPATIENT REHABILITATION  (COMPLETE FOR INITIAL CLAIMS ONLY)  Patient's Last Name, First Name, M.I.  YOB: 1970  Doreen Gramajo    Provider s Name:  [facility]   Medical Record No.  [id number]   Start of Care Date:  11/21/17   Onset Date:  11/14/17   Type:     _X__PT   ___OT   ___SLP Medical Diagnosis:  back strain     PT Diagnosis:  acute back pain   Visits from SOC:  1      _________________________________________________________________________________  Plan of Treatment/Functional Goals:  stretching, strengthening, ROM, neuromuscular re-education, manual therapy, joint mobilization           Goals  Goal Identifier: 1  Goal Description: Patient will be independant with HEP for long term self management  Target Date: 01/02/18    Goal Identifier: 2  Goal Description: Patient will be able to work 1 hour without increases in low back nadia  Target Date: 01/02/18    Goal Identifier: 3  Goal Description: Patient will be able to lift 20# floor to counter demonstrating correct mechanics   Target Date: 01/02/18          Therapy Frequency:  1 time/week  Predicted Duration of Therapy Intervention:  6 weeks    Sue Frankel PT                 I CERTIFY THE NEED FOR THESE SERVICES FURNISHED UNDER        THIS PLAN OF TREATMENT AND WHILE UNDER MY CARE     (Physician co-signature of this document indicates review and certification of the therapy plan).                         Certification Date From:  11/21/17   Certification Date To:  01/16/18    Referring Provider:  Chad    Initial Assessment        See Epic Evaluation Start of Care Date: 11/21/17

## 2017-11-21 NOTE — PROGRESS NOTES
11/21/17 1400   General Information   Type of Visit Initial OP Ortho PT Evaluation   Start of Care Date 11/21/17   Referring Physician [place]   Patient/Family Goals Statement decrease pain   Orders Evaluate and Treat   Orders Comment back strengthening exercises   Date of Order 11/14/17   Insurance Type Medicare   Medical Diagnosis back strain   Surgical/Medical history reviewed Yes  (history of mental illness)   Precautions/Limitations no known precautions/limitations   Body Part(s)   Body Part(s) Lumbar Spine/SI   Presentation and Etiology   Pertinent history of current problem (include personal factors and/or comorbidities that impact the POC) Pt reports low back pain while at work especially with working where she is a . Has typically seen a chiropractor which has been helpful but has not kept it away.  Works 3 days a week and this aggravates it.  Here with Jalen from [facility].    Impairments A. Pain;E. Decreased flexibility   Functional Limitations perform activities of daily living;perform required work activities;perform desired leisure / sports activities   Symptom Location mid low back   How/Where did it occur From insidious onset   Onset date of current episode/exacerbation 11/14/17   Chronicity New   Pain rating (0-10 point scale) Best (/10);Worst (/10)   Best (/10) 4   Worst (/10) 8   Pain quality C. Aching   Frequency of pain/symptoms C. With activity   Pain/symptoms exacerbated by A. Sitting;C. Lifting;I. Bending;L. Work tasks   Pain/symptoms eased by H. Cold;G. Heat;C. Rest   Progression of symptoms since onset: Unchanged   Current Level of Function   Patient role/employment history A. Employed   Employment Comments Housekeeping   Living environment Group home   Fall Risk Screen   Have you fallen 2 or more times in the past year? Yes   Have you fallen and had an injury in the past year? No   Is patient a fall risk? No   Fall screen comments Not due to balance deficits, due to tripping  or slipping.    System Outcome Measures   Outcome Measures Low Back Pain (see Oswestry and Chris)   Lumbar Spine/SI Objective Findings   Observation Forward flexed posture, rounded shoulders   Balance/Proprioception (Single Leg Stance) B 10 seconds   Flexion ROM Hands to lower calf   Extension ROM Extension past neutral, mild pain   Right Side Bending ROM equivalent side to side, hand to knee   Left Side Bending ROM equivalent side to side, hand to knee   Repeated Extension-Standing ROM no effect   Repeated Flexion-Standing ROM no effect   Hip Screen Hip ROM WNL   Transversus Abdominus Strength ([place] Leg Lowering-deg) fair   Hip Flexion (L2) Strength 4+/5   Hip Abduction Strength 4/5   Knee Flexion Strength 4+/5   Knee Extension (L3) Strength 4+/5   Ankle Dorsiflexion (L4) Strength 5/5   Ankle Plantar Flexion (S1) Strength 5/5   Hamstring Flexibility Limited 28* on L, 25* on R in 90/90 position   Hip Flexor Flexibility Limited mild B   Piriformis Flexibility Limited B   SLR -   Crossover SLR -   Slump Test -   Segmental Mobility NT tender to light touch   Palpation Tender to light touch B paraspinals    Planned Therapy Interventions   Planned Therapy Interventions stretching;strengthening;ROM;neuromuscular re-education;manual therapy;joint mobilization   Clinical Impression   Criteria for Skilled Therapeutic Interventions Met yes, treatment indicated   PT Diagnosis acute back pain   Influenced by the following impairments pain, decreased ROM, decreased muscle strength   Functional limitations due to impairments lifting, carrying, bending, lifting   Clinical Presentation Stable/Uncomplicated   Clinical Presentation Rationale acute mild back pain, supportive group home   Clinical Decision Making (Complexity) Low complexity   Therapy Frequency 1 time/week   Predicted Duration of Therapy Intervention (days/wks) 6 weeks   Risk & Benefits of therapy have been explained Yes   Patient, Family & other staff in agreement  with plan of care Yes   Education Assessment   Preferred Learning Style Demonstration;Pictures/video   Barriers to Learning No barriers   ORTHO GOALS   PT Ortho Eval Goals 1;2;3   Ortho Goal 1   Goal Identifier 1   Goal Description Patient will be independant with HEP for long term self management   Target Date 01/02/18   Ortho Goal 2   Goal Identifier 2   Goal Description Patient will be able to work 1 hour without increases in low back nadia   Target Date 01/02/18   Ortho Goal 3   Goal Identifier 3   Goal Description Patient will be able to lift 20# floor to counter demonstrating correct mechanics    Target Date 01/02/18   Total Evaluation Time   Total Evaluation Time 30   Therapy Certification   Certification date from 11/21/17   Certification date to 01/16/18   Medical Diagnosis back strain

## 2017-11-22 DIAGNOSIS — E61.1 IRON DEFICIENCY: ICD-10-CM

## 2017-11-22 DIAGNOSIS — F25.9 SCHIZOAFFECTIVE DISORDER, UNSPECIFIED TYPE (H): Chronic | ICD-10-CM

## 2017-11-22 DIAGNOSIS — K59.09 CHRONIC CONSTIPATION: ICD-10-CM

## 2017-11-22 RX ORDER — FERROUS SULFATE 324(65)MG
TABLET, DELAYED RELEASE (ENTERIC COATED) ORAL
Qty: 60 TABLET | Refills: 3 | Status: SHIPPED | OUTPATIENT
Start: 2017-11-22 | End: 2018-03-09

## 2017-11-22 RX ORDER — SENNOSIDES 8.6 MG
TABLET ORAL
Qty: 180 TABLET | Refills: 0 | Status: SHIPPED | OUTPATIENT
Start: 2017-11-22 | End: 2017-12-21

## 2017-11-22 RX ORDER — FOLIC ACID/MV,IRON,MIN/LUTEIN 0.4-18-25
TABLET ORAL
Qty: 30 TABLET | Refills: 3 | Status: SHIPPED | OUTPATIENT
Start: 2017-11-22 | End: 2018-03-09

## 2017-11-22 NOTE — TELEPHONE ENCOUNTER
sennosides (SENOKOT) 8.6 MG tablet      Last Written Prescription Date: 7/18/17  Last Fill Quantity: 180,  # refills: 5                                     Next 5 appointments (look out 90 days)     Dec 05, 2017  9:30 AM CST   Nurse Only with [place] PI CMA/LPN   [facility]    [address] [place] [id number]   [phone]            Dec 26, 2017  5:00 PM CST   Nurse Only with [place] PI CMA/LPN   [facility]    [address] [place] [id number]   [phone]

## 2017-11-28 ENCOUNTER — HOSPITAL ENCOUNTER (OUTPATIENT)
Dept: PHYSICAL THERAPY | Facility: CLINIC | Age: 47
Setting detail: THERAPIES SERIES
End: 2017-11-28
Attending: NURSE PRACTITIONER
Payer: MEDICARE

## 2017-11-28 PROCEDURE — 40000718 ZZHC STATISTIC PT DEPARTMENT ORTHO VISIT: Performed by: PHYSICAL THERAPIST

## 2017-11-28 PROCEDURE — 97110 THERAPEUTIC EXERCISES: CPT | Mod: GP | Performed by: PHYSICAL THERAPIST

## 2017-12-05 ENCOUNTER — MEDICAL CORRESPONDENCE (OUTPATIENT)
Dept: HEALTH INFORMATION MANAGEMENT | Facility: CLINIC | Age: 47
End: 2017-12-05

## 2017-12-05 ENCOUNTER — ALLIED HEALTH/NURSE VISIT (OUTPATIENT)
Dept: FAMILY MEDICINE | Facility: CLINIC | Age: 47
End: 2017-12-05
Payer: MEDICARE

## 2017-12-05 ENCOUNTER — HOSPITAL ENCOUNTER (OUTPATIENT)
Dept: PHYSICAL THERAPY | Facility: CLINIC | Age: 47
Setting detail: THERAPIES SERIES
End: 2017-12-05
Attending: NURSE PRACTITIONER
Payer: MEDICARE

## 2017-12-05 DIAGNOSIS — F20.0 PARANOID SCHIZOPHRENIA, SUBCHRONIC CONDITION WITH ACUTE EXACERBATION (H): ICD-10-CM

## 2017-12-05 DIAGNOSIS — F20.9 SCHIZOPHRENIA, UNSPECIFIED TYPE (H): Chronic | ICD-10-CM

## 2017-12-05 DIAGNOSIS — F20.0 PARANOID SCHIZOPHRENIA, SUBCHRONIC CONDITION (H): ICD-10-CM

## 2017-12-05 DIAGNOSIS — F31.9 BIPOLAR AFFECTIVE DISORDER, REMISSION STATUS UNSPECIFIED (H): Primary | Chronic | ICD-10-CM

## 2017-12-05 DIAGNOSIS — Z79.899 ENCOUNTER FOR LONG-TERM (CURRENT) USE OF MEDICATIONS: ICD-10-CM

## 2017-12-05 LAB
BASOPHILS # BLD AUTO: 0 10E9/L (ref 0–0.2)
BASOPHILS NFR BLD AUTO: 0.3 %
DIFFERENTIAL METHOD BLD: ABNORMAL
EOSINOPHIL # BLD AUTO: 0 10E9/L (ref 0–0.7)
EOSINOPHIL NFR BLD AUTO: 0.1 %
ERYTHROCYTE [DISTWIDTH] IN BLOOD BY AUTOMATED COUNT: 13.5 % (ref 10–15)
HCT VFR BLD AUTO: 38.7 % (ref 35–47)
HGB BLD-MCNC: 12.7 G/DL (ref 11.7–15.7)
LYMPHOCYTES # BLD AUTO: 2.6 10E9/L (ref 0.8–5.3)
LYMPHOCYTES NFR BLD AUTO: 25.2 %
MCH RBC QN AUTO: 33 PG (ref 26.5–33)
MCHC RBC AUTO-ENTMCNC: 32.8 G/DL (ref 31.5–36.5)
MCV RBC AUTO: 101 FL (ref 78–100)
MONOCYTES # BLD AUTO: 0.8 10E9/L (ref 0–1.3)
MONOCYTES NFR BLD AUTO: 7.6 %
NEUTROPHILS # BLD AUTO: 6.8 10E9/L (ref 1.6–8.3)
NEUTROPHILS NFR BLD AUTO: 66.8 %
PLATELET # BLD AUTO: 372 10E9/L (ref 150–450)
RBC # BLD AUTO: 3.85 10E12/L (ref 3.8–5.2)
WBC # BLD AUTO: 10.1 10E9/L (ref 4–11)

## 2017-12-05 PROCEDURE — 99207 ZZC NO CHARGE NURSE ONLY: CPT

## 2017-12-05 PROCEDURE — 36415 COLL VENOUS BLD VENIPUNCTURE: CPT | Performed by: NURSE PRACTITIONER

## 2017-12-05 PROCEDURE — 85025 COMPLETE CBC W/AUTO DIFF WBC: CPT | Performed by: NURSE PRACTITIONER

## 2017-12-05 PROCEDURE — 97110 THERAPEUTIC EXERCISES: CPT | Mod: GP | Performed by: PHYSICAL THERAPIST

## 2017-12-05 PROCEDURE — 40000718 ZZHC STATISTIC PT DEPARTMENT ORTHO VISIT: Performed by: PHYSICAL THERAPIST

## 2017-12-05 PROCEDURE — 96372 THER/PROPH/DIAG INJ SC/IM: CPT

## 2017-12-05 RX ORDER — HALOPERIDOL DECANOATE 100 MG/ML
100 INJECTION INTRAMUSCULAR
Qty: 1 ML | Refills: 12 | COMMUNITY
Start: 2017-12-05 | End: 2018-09-04

## 2017-12-05 NOTE — MR AVS SNAPSHOT
After Visit Summary   12/5/2017    Doreen Gramajo    MRN: [id number]           Patient Information     Date Of Birth          1970        Visit Information        Provider Department      12/5/2017 9:30 AM IVETT ARAMBULA CMA/LPN [facility]        Today's Diagnoses     Bipolar affective disorder, remission status unspecified (H)    -  1    Schizophrenia, unspecified type (H)        Paranoid schizophrenia, subchronic condition with acute exacerbation (H)           Follow-ups after your visit        Your next 10 appointments already scheduled     Dec 12, 2017  2:15 PM CST   [facility]tho [facility] Frankel PT   [facility]    [address] [address]   [phone]            Dec 26, 2017  5:00 PM CST   Nurse Only with IVETT ARAMBULA CMA/LPN   [facility]    [address] [address]   [phone]              Future tests that were ordered for you today     Open Standing Orders        Priority Remaining Interval Expires Ordered    HALOPERIDOL DECANOATE INJ Routine 12/12 every 3 weeks 8/15/2018 12/5/2017    INJECTION INTRAMUSCULAR OR SUB-Q Routine 12/12  8/15/2018 12/5/2017            Who to contact     If you have questions or need follow up information about today's clinic visit or your schedule please contact [facility] directly at [phone].  Normal or non-critical lab and imaging results will be communicated to you by MyChart, letter or phone within 4 business days after the clinic has received the results. If you do not hear from us within 7 days, please contact the clinic through MyChart or phone. If you have a critical or abnormal lab result, we will notify you by phone as soon as possible.  Submit refill requests through [employer] or call your pharmacy and they will forward the refill request to us. Please allow 3 business days for your refill to  "be completed.          Additional Information About Your Visit        [employer] Information     Ahead lets you send messages to your doctor, view your test results, renew your prescriptions, schedule appointments and more. To sign up, go to [email or web address] . Click on \"Log in\" on the left side of the screen, which will take you to the Welcome page. Then click on \"Sign up Now\" on the right side of the page.     You will be asked to enter the access code listed below, as well as some personal information. Please follow the directions to create your username and password.     Your access code is: [email or web address]  Expires: 12/10/2017  3:16 PM     Your access code will  in 90 days. If you need help or a new code, please call your [place] clinic or [phone].        Care EveryWhere ID     This is your Care EveryWhere ID. This could be used by other organizations to access your [place] medical records  [id number]         Blood Pressure from Last 3 Encounters:   17 104/68   17 124/68   17 118/70    Weight from Last 3 Encounters:   17 139 lb (63 kg)   17 154 lb (69.9 kg)   17 171 lb (77.6 kg)              We Performed the Following     HALOPERIDOL DECANOATE INJ     INJECTION INTRAMUSCULAR OR SUB-Q          Today's Medication Changes          These changes are accurate as of: 17  2:59 PM.  If you have any questions, ask your nurse or doctor.               These medicines have changed or have updated prescriptions.        Dose/Directions    haloperidol decanoate 100 MG/ML injection   Commonly known as:  HALDOL DECANOATE   This may have changed:  additional instructions   Used for:  Schizophrenia, unspecified type (H), Bipolar affective disorder, remission status unspecified (H)        Dose:  100 mg   Inject 100 mg into the muscle every 21 days Order scanned into [employer], Order from Jody Schoenecker ([facility]) ph:[phone] Received on 2017. " Refills 12.   Quantity:  1 mL   Refills:  12                Primary Care Provider Office Phone # Fax #    Josey Bonilla, [facility] [phone]       [address] [facility] [address]        Equal Access to Services     JORUDAN MARTINEZ AH: Aylin velasquez shiracarisa Soomaali, waaxda luqadaha, qaybta kaalmada adeegyada, alise teresain hayaafay laraethel lemusbijal giles. So [facility] [phone].    ATENCIÓN: Si habla español, tiene a mariano disposición servicios gratuitos de asistencia lingüística. Llame al [phone].    We comply with applicable federal civil rights laws and [place] laws. We do not discriminate on the basis of race, color, national origin, age, disability, sex, sexual orientation, or gender identity.            Thank you!     Thank you for choosing [facility]  for your care. Our goal is always to provide you with excellent care. Hearing back from our patients is one way we can continue to improve our services. Please take a few minutes to complete the written survey that you may receive in the mail after your visit with us. Thank you!             Your Updated Medication List - Protect others around you: Learn how to safely use, store and throw away your medicines at www.disposemymeds.org.          This list is accurate as of: 12/5/17  2:59 PM.  Always use your most recent med list.                   Brand Name Dispense Instructions for use Diagnosis    albuterol 108 (90 BASE) MCG/ACT Inhaler    PROAIR HFA/PROVENTIL HFA/VENTOLIN HFA    1 Inhaler    Inhale 2 puffs into the lungs every 6 hours (PRN for shortness of breath)    Asthma, intermittent, uncomplicated       atropine 0.1 mg/mL Susp suspension      Take by mouth At Bedtime        B Complex-C Tabs     30 tablet    Take 1 tablet by mouth daily    Iron deficiency       CERTAVITE/ANTIOXIDANTS Tabs tablet   Generic drug:  multivitamin, therapeutic with minerals     30 tablet    TAKE ONE TABLET BY MOUTH EVERY DAY    Schizoaffective  disorder, unspecified type (H)       Clozapine 200 MG tablet    CLOZARIL     100 mg at breakfast and 650 mg after dinner        DEPEND UNDERGARMENTS Misc     31 each    1 each daily as needed MEDIUM sized briefs    Urinary incontinence, unspecified type       DIPHENDRYL PO      Take 50 mg by mouth every 3 hours as needed        Ferrous Sulfate 324 (65 FE) MG Tbec     60 tablet    Take 1 tablet (325 mg) by mouth 2 times daily    Iron deficiency       haloperidol decanoate 100 MG/ML injection    HALDOL DECANOATE    1 mL    Inject 100 mg into the muscle every 21 days Order scanned into [facility], Order from Jody Schoenecker ([facility]) ph:[phone] Received on 12/05/2017. Refills 12.    Schizophrenia, unspecified type (H), Bipolar affective disorder, remission status unspecified (H)       * HALOPERIDOL PO      Take 5 mg by mouth every 3 hours as needed for agitation (up to three daily)        * HALOPERIDOL PO      Take 10 mg by mouth At Bedtime        IBUPROFEN PO      Take 400 mg by mouth every 4 hours as needed for moderate pain        levothyroxine 50 MCG tablet    SYNTHROID/LEVOTHROID    30 tablet    Take 1 tablet (50 mcg) by mouth daily    Hypothyroidism, unspecified type       LEXAPRO PO      Take 20 mg by mouth daily        lithium 300 MG tablet      Take 300 mg by mouth 2 times daily        LORAZEPAM PO      Take 1 mg by mouth every 3 hours as needed for anxiety        * order for DME     1 each    INCONTINENT PRODUCTS (UP / MONTH)    Chronic constipation, Schizophrenia, unspecified type (H), Schizoaffective disorder, unspecified type (H), Disturbance of conduct, Extrapyramidal symptom, Urinary incontinence, unspecified type, Bipolar affective disorder, remission status unspecified (H)       * order for DME     1 each    GLOVES    Chronic constipation, Schizophrenia, unspecified type (H), Schizoaffective disorder, unspecified type (H), Disturbance of conduct, Extrapyramidal symptom,  Urinary incontinence, unspecified type, Bipolar affective disorder, remission status unspecified (H)       * order for DME     1 each    INCONTINENT PERSONAL WIPES    Chronic constipation, Schizophrenia, unspecified type (H), Schizoaffective disorder, unspecified type (H), Disturbance of conduct, Extrapyramidal symptom, Urinary incontinence, unspecified type, Bipolar affective disorder, remission status unspecified (H)       * order for DME     1 each    CHUX PADS    Chronic constipation, Schizophrenia, unspecified type (H), Schizoaffective disorder, unspecified type (H), Disturbance of conduct, Extrapyramidal symptom, Urinary incontinence, unspecified type, Bipolar affective disorder, remission status unspecified (H)       pantoprazole 20 MG EC tablet    PROTONIX    30 tablet    Take 1 tablet (20 mg) by mouth daily (Reduced dosage to 20 mg)    Gastroesophageal reflux disease without esophagitis       polyethylene glycol powder    MIRALAX    510 g    Take 17 g (1 capful) by mouth daily    Chronic constipation       sennosides 8.6 MG tablet    SENOKOT    180 tablet    TAKE THREE TABLETS BY MOUTH TWICE DAILY    Chronic constipation       simethicone 80 MG chewable tablet    MYLICON    180 tablet    Take 1 tablet (80 mg) by mouth every 6 hours as needed for flatulence or cramping PRN    Flatulence, eructation and gas pain       TRAZODONE HCL PO      Take 200 mg by mouth At Bedtime        * Notice:  This list has 6 medication(s) that are the same as other medications prescribed for you. Read the directions carefully, and ask your doctor or other care provider to review them with you.

## 2017-12-05 NOTE — NURSING NOTE
Prior to injection verified patient identity using patient's name and date of birth.     injection of haldol was given by Johanny Garcia CMA. Patient instructed to remain in clinic for 15 minutes afterwards, and to report any adverse reaction to me immediately.      New orders received today from [facility]. Jody Schoenecker NP.  100 mg every 3 weeks. Refill of 12.     Standing orders placed. And original sent to scan.     Johanny Garcia CMA

## 2017-12-12 ENCOUNTER — HOSPITAL ENCOUNTER (OUTPATIENT)
Dept: PHYSICAL THERAPY | Facility: CLINIC | Age: 47
Setting detail: THERAPIES SERIES
End: 2017-12-12
Attending: NURSE PRACTITIONER
Payer: MEDICARE

## 2017-12-12 DIAGNOSIS — Z79.899 NEED FOR PROPHYLACTIC CHEMOTHERAPY: Primary | ICD-10-CM

## 2017-12-12 PROCEDURE — 97110 THERAPEUTIC EXERCISES: CPT | Mod: GP | Performed by: PHYSICAL THERAPIST

## 2017-12-12 PROCEDURE — 40000718 ZZHC STATISTIC PT DEPARTMENT ORTHO VISIT: Performed by: PHYSICAL THERAPIST

## 2017-12-21 DIAGNOSIS — K59.09 CHRONIC CONSTIPATION: ICD-10-CM

## 2017-12-21 RX ORDER — SENNOSIDES 8.6 MG
TABLET ORAL
Qty: 200 TABLET | Refills: 0 | Status: SHIPPED | OUTPATIENT
Start: 2017-12-21 | End: 2018-01-15

## 2017-12-26 ENCOUNTER — ALLIED HEALTH/NURSE VISIT (OUTPATIENT)
Dept: FAMILY MEDICINE | Facility: CLINIC | Age: 47
End: 2017-12-26
Payer: MEDICARE

## 2017-12-26 DIAGNOSIS — F20.9 SCHIZOPHRENIA, UNSPECIFIED TYPE (H): Chronic | ICD-10-CM

## 2017-12-26 DIAGNOSIS — F20.0 PARANOID SCHIZOPHRENIA, SUBCHRONIC CONDITION WITH ACUTE EXACERBATION (H): ICD-10-CM

## 2017-12-26 DIAGNOSIS — F31.9 BIPOLAR AFFECTIVE DISORDER, REMISSION STATUS UNSPECIFIED (H): Chronic | ICD-10-CM

## 2017-12-26 PROCEDURE — 99207 ZZC NO CHARGE NURSE ONLY: CPT

## 2017-12-26 PROCEDURE — 96372 THER/PROPH/DIAG INJ SC/IM: CPT

## 2017-12-26 NOTE — MR AVS SNAPSHOT
After Visit Summary   12/26/2017    Doreen Gramajo    MRN: [id number]           Patient Information     Date Of Birth          1970        Visit Information        Provider Department      12/26/2017 5:00 PM FL PI GREY/LPN [facility]        Today's Diagnoses     Bipolar affective disorder, remission status unspecified (H)        Schizophrenia, unspecified type (H)        Paranoid schizophrenia, subchronic condition with acute exacerbation (H)           Follow-ups after your visit        Your next 10 appointments already scheduled     Jan 02, 2018  9:00 AM CST   LAB with PI LAB   [facility]    [address] [place] [address]   [phone]           Please do not eat 10-12 hours before your appointment if you are coming in fasting for labs on lipids, cholesterol, or glucose (sugar). This does not apply to pregnant women. Water, hot tea and black coffee (with nothing added) are okay. Do not drink other fluids, diet soda or chew gum.            Jan 02, 2018  9:45 AM CST   Ortho Treatment with Sue Frankel PT   [facility]    [address] [place] [address]   [phone]            Jan 16, 2018 11:00 AM CST   Nurse Only with FL PI CMA/LPN   [facility] ([facility])    [address] [place] [address]   [phone]            Jan 30, 2018  9:00 AM CST   LAB with PI LAB   [facility] ([facility]    [address] [place] [address]   [phone]           Please do not eat 10-12 hours before your appointment if you are coming in fasting for labs on lipids, cholesterol, or glucose (sugar). This does not apply to pregnant women. Water, hot tea and black coffee (with nothing added) are okay. Do not drink other fluids, diet soda or chew gum.            Feb 06, 2018  9:30 AM CST   Nurse  "Only with FL PI CMA/LPN   [facility]    [address] [facility] [address] [phone]            Feb 27, 2018  9:00 AM CST   LAB with PI LAB   [facility]    [address] [facility] [address] [phone]           Please do not eat 10-12 hours before your appointment if you are coming in fasting for labs on lipids, cholesterol, or glucose (sugar). This does not apply to pregnant women. Water, hot tea and black coffee (with nothing added) are okay. Do not drink other fluids, diet soda or chew gum.            Feb 27, 2018  9:30 AM CST   Nurse Only with FL PI CMA/LPN   [facility]    [address] [facility] [id number] [phone]              Who to contact     If you have questions or need follow up information about today's clinic visit or your schedule please contact [facility] directly at [phone].  Normal or non-critical lab and imaging results will be communicated to you by [employer]comhart, letter or phone within 4 business days after the clinic has received the results. If you do not hear from us within 7 days, please contact the clinic through [employer] or phone. If you have a critical or abnormal lab result, we will notify you by phone as soon as possible.  Submit refill requests through [employer] or call your pharmacy and they will forward the refill request to us. Please allow 3 business days for your refill to be completed.          Additional Information About Your Visit        [employer] Information     [employer] lets you send messages to your doctor, view your test results, renew your prescriptions, schedule appointments and more. To sign up, go to [email or web address] . Click on \"Log in\" on the left side of the screen, which will take you to the Welcome page. Then click on \"Sign up Now\" on the right side of the page. "     You will be asked to enter the access code listed below, as well as some personal information. Please follow the directions to create your username and password.     Your access code is: [email or web address]  Expires: 3/26/2018  7:15 PM     Your access code will  in 90 days. If you need help or a new code, please call your [place] clinic or [phone].        Care EveryWhere ID     This is your Care EveryWhere ID. This could be used by other organizations to access your [place] medical records  [id number]         Blood Pressure from Last 3 Encounters:   17 104/68   17 124/68   17 118/70    Weight from Last 3 Encounters:   17 139 lb (63 kg)   17 154 lb (69.9 kg)   17 171 lb (77.6 kg)              We Performed the Following     HALOPERIDOL DECANOATE INJ     INJECTION INTRAMUSCULAR OR SUB-Q        Primary Care Provider Office Phone # Fax #    Josey Leighann Bonilla, [facility] [phone]       [address] [id number]        Equal Access to Services     [facility] : Hadii aad ku hadasho Soomaali, waaxda luqadaha, qaybta kaalmada adeethelyada, alise gutierrez . So [facility] [phone].    ATENCIÓN: Si habla español, tiene a mariano disposición servicios gratuitos de asistencia lingüística. Curtisame al [phone].    We comply with applicable federal civil rights laws and [place] laws. We do not discriminate on the basis of race, color, national origin, age, disability, sex, sexual orientation, or gender identity.            Thank you!     Thank you for choosing [facility]  for your care. Our goal is always to provide you with excellent care. Hearing back from our patients is one way we can continue to improve our services. Please take a few minutes to complete the written survey that you may receive in the mail after your visit with us. Thank you!             Your Updated Medication List - Protect others around you:  Learn how to safely use, store and throw away your medicines at www.disposemymeds.org.          This list is accurate as of: 12/26/17  7:15 PM.  Always use your most recent med list.                   Brand Name Dispense Instructions for use Diagnosis    albuterol 108 (90 BASE) MCG/ACT Inhaler    PROAIR HFA/PROVENTIL HFA/VENTOLIN HFA    1 Inhaler    Inhale 2 puffs into the lungs every 6 hours (PRN for shortness of breath)    Asthma, intermittent, uncomplicated       atropine 0.1 mg/mL Susp suspension      Take by mouth At Bedtime        B Complex-C Tabs     30 tablet    Take 1 tablet by mouth daily    Iron deficiency       CERTAVITE/ANTIOXIDANTS Tabs tablet   Generic drug:  multivitamin, therapeutic with minerals     30 tablet    TAKE ONE TABLET BY MOUTH EVERY DAY    Schizoaffective disorder, unspecified type (H)       Clozapine 200 MG tablet    CLOZARIL     100 mg at breakfast and 650 mg after dinner        DEPEND UNDERGARMENTS Misc     31 each    1 each daily as needed MEDIUM sized briefs    Urinary incontinence, unspecified type       DIPHENDRYL PO      Take 50 mg by mouth every 3 hours as needed        Ferrous Sulfate 324 (65 FE) MG Tbec     60 tablet    Take 1 tablet (325 mg) by mouth 2 times daily    Iron deficiency       haloperidol decanoate 100 MG/ML injection    HALDOL DECANOATE    1 mL    Inject 100 mg into the muscle every 21 days Order scanned into [employer], Order from Jody Schoenecker ([facility]) ph:[phone] Received on 12/05/2017. Refills 12.    Schizophrenia, unspecified type (H), Bipolar affective disorder, remission status unspecified (H)       * HALOPERIDOL PO      Take 5 mg by mouth every 3 hours as needed for agitation (up to three daily)        * HALOPERIDOL PO      Take 10 mg by mouth At Bedtime        IBUPROFEN PO      Take 400 mg by mouth every 4 hours as needed for moderate pain        levothyroxine 50 MCG tablet    SYNTHROID/LEVOTHROID    30 tablet    Take 1 tablet (50  mcg) by mouth daily    Hypothyroidism, unspecified type       LEXAPRO PO      Take 20 mg by mouth daily        lithium 300 MG tablet      Take 300 mg by mouth 2 times daily        LORAZEPAM PO      Take 1 mg by mouth every 3 hours as needed for anxiety        * order for DME     1 each    INCONTINENT PRODUCTS (UP / MONTH)    Chronic constipation, Schizophrenia, unspecified type (H), Schizoaffective disorder, unspecified type (H), Disturbance of conduct, Extrapyramidal symptom, Urinary incontinence, unspecified type, Bipolar affective disorder, remission status unspecified (H)       * order for DME     1 each    GLOVES    Chronic constipation, Schizophrenia, unspecified type (H), Schizoaffective disorder, unspecified type (H), Disturbance of conduct, Extrapyramidal symptom, Urinary incontinence, unspecified type, Bipolar affective disorder, remission status unspecified (H)       * order for DME     1 each    INCONTINENT PERSONAL WIPES    Chronic constipation, Schizophrenia, unspecified type (H), Schizoaffective disorder, unspecified type (H), Disturbance of conduct, Extrapyramidal symptom, Urinary incontinence, unspecified type, Bipolar affective disorder, remission status unspecified (H)       * order for DME     1 each    CHUX PADS    Chronic constipation, Schizophrenia, unspecified type (H), Schizoaffective disorder, unspecified type (H), Disturbance of conduct, Extrapyramidal symptom, Urinary incontinence, unspecified type, Bipolar affective disorder, remission status unspecified (H)       pantoprazole 20 MG EC tablet    PROTONIX    30 tablet    Take 1 tablet (20 mg) by mouth daily (Reduced dosage to 20 mg)    Gastroesophageal reflux disease without esophagitis       polyethylene glycol powder    MIRALAX    510 g    Take 17 g (1 capful) by mouth daily    Chronic constipation       sennosides 8.6 MG tablet    SENOKOT    200 tablet    TAKE THREE TABLETS BY MOUTH TWICE DAILY    Chronic constipation        simethicone 80 MG chewable tablet    MYLICON    180 tablet    Take 1 tablet (80 mg) by mouth every 6 hours as needed for flatulence or cramping PRN    Flatulence, eructation and gas pain       TRAZODONE HCL PO      Take 200 mg by mouth At Bedtime        * Notice:  This list has 6 medication(s) that are the same as other medications prescribed for you. Read the directions carefully, and ask your doctor or other care provider to review them with you.

## 2017-12-27 NOTE — NURSING NOTE
Prior to injection verified patient identity using patient's name and date of birth.    injection of Haldol was given by Johanny Garcia CMA. Patient instructed to remain in clinic for 15 minutes afterwards, and to report any adverse reaction to me immediately.

## 2018-01-02 ENCOUNTER — TELEPHONE (OUTPATIENT)
Dept: FAMILY MEDICINE | Facility: CLINIC | Age: 48
End: 2018-01-02

## 2018-01-02 ENCOUNTER — HOSPITAL ENCOUNTER (OUTPATIENT)
Dept: PHYSICAL THERAPY | Facility: CLINIC | Age: 48
Setting detail: THERAPIES SERIES
End: 2018-01-02
Attending: NURSE PRACTITIONER
Payer: MEDICARE

## 2018-01-02 DIAGNOSIS — F20.0 PARANOID SCHIZOPHRENIA, SUBCHRONIC CONDITION (H): ICD-10-CM

## 2018-01-02 DIAGNOSIS — Z79.899 ENCOUNTER FOR LONG-TERM (CURRENT) USE OF MEDICATIONS: ICD-10-CM

## 2018-01-02 DIAGNOSIS — Z79.899 NEED FOR PROPHYLACTIC CHEMOTHERAPY: ICD-10-CM

## 2018-01-02 LAB
ANION GAP SERPL CALCULATED.3IONS-SCNC: 7 MMOL/L (ref 3–14)
BASOPHILS # BLD AUTO: 0 10E9/L (ref 0–0.2)
BASOPHILS NFR BLD AUTO: 0.3 %
BUN SERPL-MCNC: 14 MG/DL (ref 7–30)
CALCIUM SERPL-MCNC: 9.4 MG/DL (ref 8.5–10.1)
CHLORIDE SERPL-SCNC: 105 MMOL/L (ref 94–109)
CO2 SERPL-SCNC: 25 MMOL/L (ref 20–32)
CREAT SERPL-MCNC: 0.84 MG/DL (ref 0.52–1.04)
DIFFERENTIAL METHOD BLD: ABNORMAL
EOSINOPHIL # BLD AUTO: 0 10E9/L (ref 0–0.7)
EOSINOPHIL NFR BLD AUTO: 0.1 %
ERYTHROCYTE [DISTWIDTH] IN BLOOD BY AUTOMATED COUNT: 13.9 % (ref 10–15)
GFR SERPL CREATININE-BSD FRML MDRD: 73 ML/MIN/1.7M2
GLUCOSE SERPL-MCNC: 77 MG/DL (ref 70–99)
HCT VFR BLD AUTO: 39.2 % (ref 35–47)
HGB BLD-MCNC: 12.7 G/DL (ref 11.7–15.7)
LYMPHOCYTES # BLD AUTO: 1.8 10E9/L (ref 0.8–5.3)
LYMPHOCYTES NFR BLD AUTO: 15 %
MCH RBC QN AUTO: 33.2 PG (ref 26.5–33)
MCHC RBC AUTO-ENTMCNC: 32.4 G/DL (ref 31.5–36.5)
MCV RBC AUTO: 102 FL (ref 78–100)
MONOCYTES # BLD AUTO: 0.7 10E9/L (ref 0–1.3)
MONOCYTES NFR BLD AUTO: 6 %
NEUTROPHILS # BLD AUTO: 9.2 10E9/L (ref 1.6–8.3)
NEUTROPHILS NFR BLD AUTO: 78.6 %
PLATELET # BLD AUTO: 382 10E9/L (ref 150–450)
POTASSIUM SERPL-SCNC: 4 MMOL/L (ref 3.4–5.3)
RBC # BLD AUTO: 3.83 10E12/L (ref 3.8–5.2)
SODIUM SERPL-SCNC: 137 MMOL/L (ref 133–144)
WBC # BLD AUTO: 11.8 10E9/L (ref 4–11)

## 2018-01-02 PROCEDURE — 85025 COMPLETE CBC W/AUTO DIFF WBC: CPT | Performed by: NURSE PRACTITIONER

## 2018-01-02 PROCEDURE — 80159 DRUG ASSAY CLOZAPINE: CPT | Mod: 90 | Performed by: NURSE PRACTITIONER

## 2018-01-02 PROCEDURE — 40000718 ZZHC STATISTIC PT DEPARTMENT ORTHO VISIT: Performed by: PHYSICAL THERAPIST

## 2018-01-02 PROCEDURE — 36415 COLL VENOUS BLD VENIPUNCTURE: CPT | Performed by: NURSE PRACTITIONER

## 2018-01-02 PROCEDURE — 80048 BASIC METABOLIC PNL TOTAL CA: CPT | Performed by: NURSE PRACTITIONER

## 2018-01-02 PROCEDURE — 97110 THERAPEUTIC EXERCISES: CPT | Mod: GP | Performed by: PHYSICAL THERAPIST

## 2018-01-02 NOTE — TELEPHONE ENCOUNTER
Rec'd written notification from group home staff informing pt did not take morning scheduled meds on 01-01-18 while on home visit.   No action required.  DIANA Shukla RN

## 2018-01-03 LAB
CLOZAPINE AND METABOLITES TOTAL: 1019 NG/ML
CLOZAPINE SERPL-MCNC: 570 NG/ML
CLOZAPINE-N-OXIDE QUANT: 123 NG/ML
NORCLOZAPINE SERPL-MCNC: 326 NG/ML

## 2018-01-15 DIAGNOSIS — K21.9 GASTROESOPHAGEAL REFLUX DISEASE WITHOUT ESOPHAGITIS: ICD-10-CM

## 2018-01-15 DIAGNOSIS — K59.09 CHRONIC CONSTIPATION: ICD-10-CM

## 2018-01-15 RX ORDER — SENNOSIDES 8.6 MG
TABLET ORAL
Qty: 200 TABLET | Refills: 11 | Status: SHIPPED | OUTPATIENT
Start: 2018-01-15 | End: 2018-12-26

## 2018-01-15 RX ORDER — PANTOPRAZOLE SODIUM 20 MG/1
TABLET, DELAYED RELEASE ORAL
Qty: 30 TABLET | Refills: 11 | Status: SHIPPED | OUTPATIENT
Start: 2018-01-15 | End: 2018-11-26

## 2018-01-15 NOTE — TELEPHONE ENCOUNTER
Requested Prescriptions   Pending Prescriptions Disp Refills     sennosides (SENOKOT) 8.6 MG tablet [Pharmacy Med Name: SENNA 8.6 MG TABLET] 200 tablet      Sig: TAKE THREE TABLETS BY MOUTH TWICE DAILY    There is no refill protocol information for this order        sennosides (SENOKOT) 8.6 MG tablet  Last Written Prescription Date:  12/21/2017  Last Fill Quantity: 200 tablet,  # refills: 0   Last Office Visit with [facility] P or [facility] prescribing provider:  11/14/2017   Future Office Visit:    Next 5 appointments (look out 90 days)     Jan 16, 2018 11:00 AM CST   Nurse Only with [place] PI CMA/LPN   [facility]    [address] [place] [address]   [phone]            Feb 06, 2018  9:30 AM CST   Nurse Only with FL PI CMA/LPN   [facility]    [address] [place] [id number] [phone]            Feb 27, 2018  9:30 AM CST   Nurse Only with FL PI CMA/LPN   [facility]    [address] [place] [id number] [phone]                 Yolei SILVERIO (IVAN) (M)

## 2018-01-15 NOTE — TELEPHONE ENCOUNTER
"Requested Prescriptions   Pending Prescriptions Disp Refills     pantoprazole (PROTONIX) 20 MG EC tablet [Pharmacy Med Name: PANTOPRAZOLE SODIUM 20 MG TABLET DR] 30 tablet      Sig: TAKE ONE TABLET BY MOUTH EVERY DAY    PPI Protocol Passed    1/15/2018 11:11 AM       Passed - Not on Clopidogrel (unless Pantoprazole ordered)       Passed - No diagnosis of osteoporosis on record       Passed - Recent or future visit with authorizing provider's specialty    Patient had office visit in the last year or has a visit in the next 30 days with authorizing provider.  See \"Patient Info\" tab in inbasket, or \"Choose Columns\" in Meds & Orders section of the refill encounter.              Passed - Patient is age 18 or older       Passed - No active pregnacy on record       Passed - No positive pregnancy test in past 12 months        pantoprazole (PROTONIX) 20 MG EC tablet  Last Written Prescription Date:  10/23/2017  Last Fill Quantity: 30 tablet,  # refills: 3   Last Office Visit with [facility], [facility] or [facility] prescribing provider:  11/14/2017   Future Office Visit:    Next 5 appointments (look out 90 days)     Jan 16, 2018 11:00 AM CST   Nurse Only with [place] PI CMA/LPN   [facility] ([facility])    [address] [place] [address]   [phone]            Feb 06, 2018  9:30 AM CST   Nurse Only with [place] PI CMA/LPN   [facility] ([facility])    [address] [place] [address]   [phone]            Feb 27, 2018  9:30 AM CST   Nurse Only with [place] PI CMA/LPN   [facility] ([facility])    [address] [place] [address]   [phone]                 Yolie Chowdhury RT (R) (M)    "

## 2018-01-16 ENCOUNTER — ALLIED HEALTH/NURSE VISIT (OUTPATIENT)
Dept: FAMILY MEDICINE | Facility: CLINIC | Age: 48
End: 2018-01-16
Payer: MEDICARE

## 2018-01-16 DIAGNOSIS — F20.0 PARANOID SCHIZOPHRENIA, SUBCHRONIC CONDITION WITH ACUTE EXACERBATION (H): ICD-10-CM

## 2018-01-16 DIAGNOSIS — F31.9 BIPOLAR AFFECTIVE DISORDER, REMISSION STATUS UNSPECIFIED (H): Chronic | ICD-10-CM

## 2018-01-16 DIAGNOSIS — F20.9 SCHIZOPHRENIA, UNSPECIFIED TYPE (H): Chronic | ICD-10-CM

## 2018-01-16 PROCEDURE — 96372 THER/PROPH/DIAG INJ SC/IM: CPT

## 2018-01-16 PROCEDURE — 99207 ZZC NO CHARGE NURSE ONLY: CPT

## 2018-01-16 NOTE — MR AVS SNAPSHOT
After Visit Summary   1/16/2018    Doreen Gramajo    MRN: [id number]           Patient Information     Date Of Birth          1970        Visit Information        Provider Department      1/16/2018 11:00 AM [place] PI CMA/LPN [facility]        Today's Diagnoses     Bipolar affective disorder, remission status unspecified (H)        Schizophrenia, unspecified type (H)        Paranoid schizophrenia, subchronic condition with acute exacerbation (H)           Follow-ups after your visit        Your next 10 appointments already scheduled     Jan 30, 2018  9:00 AM CST   LAB with PI LAB   [facility]    [address] [place] [address]   [phone]           Please do not eat 10-12 hours before your appointment if you are coming in fasting for labs on lipids, cholesterol, or glucose (sugar). This does not apply to pregnant women. Water, hot tea and black coffee (with nothing added) are okay. Do not drink other fluids, diet soda or chew gum.            Feb 06, 2018  9:30 AM CST   Nurse Only with [place] PI CMA/LPN   [facility]    [address] [facility] [id number] [phone]            Feb 27, 2018  9:00 AM CST   LAB with PI LAB   [facility]    [address] [facility] [id number] [phone]           Please do not eat 10-12 hours before your appointment if you are coming in fasting for labs on lipids, cholesterol, or glucose (sugar). This does not apply to pregnant women. Water, hot tea and black coffee (with nothing added) are okay. Do not drink other fluids, diet soda or chew gum.            Feb 27, 2018  9:30 AM CST   Nurse Only with [place] PI CMA/LPN   [facility]    [address] [facility] [address] [phone]              Who to contact     If you have questions  "or need follow up information about today's clinic visit or your schedule please contact [facility] directly at [phone].  Normal or non-critical lab and imaging results will be communicated to you by orderbird AGhart, letter or phone within 4 business days after the clinic has received the results. If you do not hear from us within 7 days, please contact the clinic through orderbird AGhart or phone. If you have a critical or abnormal lab result, we will notify you by phone as soon as possible.  Submit refill requests through Moxtra or call your pharmacy and they will forward the refill request to us. Please allow 3 business days for your refill to be completed.          Additional Information About Your Visit        orderbird AGharShootHome Information     Moxtra lets you send messages to your doctor, view your test results, renew your prescriptions, schedule appointments and more. To sign up, go to [email or web address] . Click on \"Log in\" on the left side of the screen, which will take you to the Welcome page. Then click on \"Sign up Now\" on the right side of the page.     You will be asked to enter the access code listed below, as well as some personal information. Please follow the directions to create your username and password.     Your access code is: [email or web address]  Expires: 3/26/2018  7:15 PM     Your access code will  in 90 days. If you need help or a new code, please call your [place] clinic or [phone].        Care EveryWhere ID     This is your Care EveryWhere ID. This could be used by other organizations to access your [place] medical records  [id number]         Blood Pressure from Last 3 Encounters:   17 104/68   17 124/68   17 118/70    Weight from Last 3 Encounters:   17 139 lb (63 kg)   17 154 lb (69.9 kg)   17 171 lb (77.6 kg)              We Performed the Following     HALOPERIDOL DECANOATE INJ     INJECTION INTRAMUSCULAR OR SUB-Q        Primary Care Provider " Office Phone # Fax #    Josey Bonilla, [facility] [phone]       [address] [facility] [address]        Equal Access to Services     JOURDAN MARTINEZ : Hadii aad ku hadirinacarisa Sobethany, waaxda luqadaha, qaybta kaalmada reid, alise etresain hayaafay laraethel bessytesfayebijal giles. So [facility] [phone].    ATENCIÓN: Si habla español, tiene a mariano disposición servicios gratuitos de asistencia lingüística. Llame al [phone].    We comply with applicable federal civil rights laws and [place] laws. We do not discriminate on the basis of race, color, national origin, age, disability, sex, sexual orientation, or gender identity.            Thank you!     Thank you for choosing [facility]  for your care. Our goal is always to provide you with excellent care. Hearing back from our patients is one way we can continue to improve our services. Please take a few minutes to complete the written survey that you may receive in the mail after your visit with us. Thank you!             Your Updated Medication List - Protect others around you: Learn how to safely use, store and throw away your medicines at www.disposemymeds.org.          This list is accurate as of: 1/16/18  3:50 PM.  Always use your most recent med list.                   Brand Name Dispense Instructions for use Diagnosis    albuterol 108 (90 BASE) MCG/ACT Inhaler    PROAIR HFA/PROVENTIL HFA/VENTOLIN HFA    1 Inhaler    Inhale 2 puffs into the lungs every 6 hours (PRN for shortness of breath)    Asthma, intermittent, uncomplicated       atropine 0.1 mg/mL Susp suspension      Take by mouth At Bedtime        B Complex-C Tabs     30 tablet    Take 1 tablet by mouth daily    Iron deficiency       CERTAVITE/ANTIOXIDANTS Tabs tablet   Generic drug:  multivitamin, therapeutic with minerals     30 tablet    TAKE ONE TABLET BY MOUTH EVERY DAY    Schizoaffective disorder, unspecified type (H)       Clozapine 200 MG tablet    CLOZARIL     100  mg at breakfast and 650 mg after dinner        DEPEND UNDERGARMENTS Misc     31 each    1 each daily as needed MEDIUM sized briefs    Urinary incontinence, unspecified type       DIPHENDRYL PO      Take 50 mg by mouth every 3 hours as needed        Ferrous Sulfate 324 (65 FE) MG Tbec     60 tablet    Take 1 tablet (325 mg) by mouth 2 times daily    Iron deficiency       haloperidol decanoate 100 MG/ML injection    HALDOL DECANOATE    1 mL    Inject 100 mg into the muscle every 21 days Order scanned into [facility], Order from Jody Schoenecker ([facility]) ph:[phone] Received on 12/05/2017. Refills 12.    Schizophrenia, unspecified type (H), Bipolar affective disorder, remission status unspecified (H)       * HALOPERIDOL PO      Take 5 mg by mouth every 3 hours as needed for agitation (up to three daily)        * HALOPERIDOL PO      Take 10 mg by mouth At Bedtime        IBUPROFEN PO      Take 400 mg by mouth every 4 hours as needed for moderate pain        levothyroxine 50 MCG tablet    SYNTHROID/LEVOTHROID    30 tablet    Take 1 tablet (50 mcg) by mouth daily    Hypothyroidism, unspecified type       LEXAPRO PO      Take 20 mg by mouth daily        lithium 300 MG tablet      Take 300 mg by mouth 2 times daily        LORAZEPAM PO      Take 1 mg by mouth every 3 hours as needed for anxiety        * order for DME     1 each    INCONTINENT PRODUCTS (UP / MONTH)    Chronic constipation, Schizophrenia, unspecified type (H), Schizoaffective disorder, unspecified type (H), Disturbance of conduct, Extrapyramidal symptom, Urinary incontinence, unspecified type, Bipolar affective disorder, remission status unspecified (H)       * order for DME     1 each    GLOVES    Chronic constipation, Schizophrenia, unspecified type (H), Schizoaffective disorder, unspecified type (H), Disturbance of conduct, Extrapyramidal symptom, Urinary incontinence, unspecified type, Bipolar affective disorder, remission status  unspecified (H)       * order for DME     1 each    INCONTINENT PERSONAL WIPES    Chronic constipation, Schizophrenia, unspecified type (H), Schizoaffective disorder, unspecified type (H), Disturbance of conduct, Extrapyramidal symptom, Urinary incontinence, unspecified type, Bipolar affective disorder, remission status unspecified (H)       * order for DME     1 each    CHUX PADS    Chronic constipation, Schizophrenia, unspecified type (H), Schizoaffective disorder, unspecified type (H), Disturbance of conduct, Extrapyramidal symptom, Urinary incontinence, unspecified type, Bipolar affective disorder, remission status unspecified (H)       pantoprazole 20 MG EC tablet    PROTONIX    30 tablet    TAKE ONE TABLET BY MOUTH EVERY DAY    Gastroesophageal reflux disease without esophagitis       polyethylene glycol powder    MIRALAX    510 g    Take 17 g (1 capful) by mouth daily    Chronic constipation       sennosides 8.6 MG tablet    SENOKOT    200 tablet    TAKE THREE TABLETS BY MOUTH TWICE DAILY    Chronic constipation       simethicone 80 MG chewable tablet    MYLICON    180 tablet    Take 1 tablet (80 mg) by mouth every 6 hours as needed for flatulence or cramping PRN    Flatulence, eructation and gas pain       TRAZODONE HCL PO      Take 200 mg by mouth At Bedtime        * Notice:  This list has 6 medication(s) that are the same as other medications prescribed for you. Read the directions carefully, and ask your doctor or other care provider to review them with you.

## 2018-01-16 NOTE — NURSING NOTE
Prior to injection verified patient identity using patient's name and date of birth.     Due to injection administration, patient instructed to remain in clinic for 15 minutes  afterwards, and to report any adverse reaction to me immediately.

## 2018-01-30 DIAGNOSIS — Z79.899 NEED FOR PROPHYLACTIC CHEMOTHERAPY: ICD-10-CM

## 2018-01-30 DIAGNOSIS — F20.0 PARANOID SCHIZOPHRENIA, SUBCHRONIC CONDITION (H): ICD-10-CM

## 2018-01-30 DIAGNOSIS — Z79.899 ENCOUNTER FOR LONG-TERM (CURRENT) USE OF MEDICATIONS: ICD-10-CM

## 2018-01-30 LAB
BASOPHILS # BLD AUTO: 0 10E9/L (ref 0–0.2)
BASOPHILS NFR BLD AUTO: 0.3 %
DIFFERENTIAL METHOD BLD: ABNORMAL
EOSINOPHIL # BLD AUTO: 0 10E9/L (ref 0–0.7)
EOSINOPHIL NFR BLD AUTO: 0 %
ERYTHROCYTE [DISTWIDTH] IN BLOOD BY AUTOMATED COUNT: 13.6 % (ref 10–15)
HCT VFR BLD AUTO: 37 % (ref 35–47)
HGB BLD-MCNC: 12.4 G/DL (ref 11.7–15.7)
LYMPHOCYTES # BLD AUTO: 2.2 10E9/L (ref 0.8–5.3)
LYMPHOCYTES NFR BLD AUTO: 22.8 %
MCH RBC QN AUTO: 33.4 PG (ref 26.5–33)
MCHC RBC AUTO-ENTMCNC: 33.5 G/DL (ref 31.5–36.5)
MCV RBC AUTO: 100 FL (ref 78–100)
MONOCYTES # BLD AUTO: 0.7 10E9/L (ref 0–1.3)
MONOCYTES NFR BLD AUTO: 6.8 %
NEUTROPHILS # BLD AUTO: 6.8 10E9/L (ref 1.6–8.3)
NEUTROPHILS NFR BLD AUTO: 70.1 %
PLATELET # BLD AUTO: 351 10E9/L (ref 150–450)
RBC # BLD AUTO: 3.71 10E12/L (ref 3.8–5.2)
WBC # BLD AUTO: 9.7 10E9/L (ref 4–11)

## 2018-01-30 PROCEDURE — 80159 DRUG ASSAY CLOZAPINE: CPT | Mod: 90 | Performed by: NURSE PRACTITIONER

## 2018-01-30 PROCEDURE — 85025 COMPLETE CBC W/AUTO DIFF WBC: CPT | Performed by: NURSE PRACTITIONER

## 2018-01-30 PROCEDURE — 36415 COLL VENOUS BLD VENIPUNCTURE: CPT | Performed by: NURSE PRACTITIONER

## 2018-02-02 LAB
CLOZAPINE AND METABOLITES TOTAL: 832 NG/ML
CLOZAPINE SERPL-MCNC: 484 NG/ML
CLOZAPINE-N-OXIDE QUANT: 101 NG/ML
NORCLOZAPINE SERPL-MCNC: 247 NG/ML

## 2018-02-06 ENCOUNTER — TRANSFERRED RECORDS (OUTPATIENT)
Dept: HEALTH INFORMATION MANAGEMENT | Facility: CLINIC | Age: 48
End: 2018-02-06

## 2018-02-06 ENCOUNTER — ALLIED HEALTH/NURSE VISIT (OUTPATIENT)
Dept: FAMILY MEDICINE | Facility: CLINIC | Age: 48
End: 2018-02-06
Payer: MEDICARE

## 2018-02-06 DIAGNOSIS — F20.0 PARANOID SCHIZOPHRENIA, SUBCHRONIC CONDITION WITH ACUTE EXACERBATION (H): ICD-10-CM

## 2018-02-06 DIAGNOSIS — F20.9 SCHIZOPHRENIA, UNSPECIFIED TYPE (H): Chronic | ICD-10-CM

## 2018-02-06 DIAGNOSIS — F31.9 BIPOLAR AFFECTIVE DISORDER, REMISSION STATUS UNSPECIFIED (H): Chronic | ICD-10-CM

## 2018-02-06 PROCEDURE — 96372 THER/PROPH/DIAG INJ SC/IM: CPT

## 2018-02-12 NOTE — PROGRESS NOTES
Outpatient Physical Therapy Discharge Note     Patient: Doreen Gramajo  : 1970    Beginning/End Dates of Reporting Period:  17 to 18    Referring Provider: [place]    Therapy Diagnosis: acute low back pain    Pt attended 5 PT sessions to address low back pain, meeting goals and pt did learn HEP to continue independantly. Chart held open 30 days for return if needed and pt did not return in that time frame.   Goal status and current objective information is therefore unknown.  Discharge from PT services at this time for this episode of treatment. Please see attached documentation under this episode of care for further information including dates of service, start of care date, referring physician, Dx, treatment plan, treatments, etc.    Please contact me with any questions or concerns.    Thank you for your referral.    Sue Frankel, PT, DPT  Physical Therapist   [facility] - [address] [address] [phone]

## 2018-02-27 ENCOUNTER — ALLIED HEALTH/NURSE VISIT (OUTPATIENT)
Dept: FAMILY MEDICINE | Facility: CLINIC | Age: 48
End: 2018-02-27
Payer: MEDICARE

## 2018-02-27 DIAGNOSIS — F20.9 SCHIZOPHRENIA, UNSPECIFIED TYPE (H): Chronic | ICD-10-CM

## 2018-02-27 DIAGNOSIS — F20.0 PARANOID SCHIZOPHRENIA, SUBCHRONIC CONDITION WITH ACUTE EXACERBATION (H): ICD-10-CM

## 2018-02-27 DIAGNOSIS — F20.0 PARANOID SCHIZOPHRENIA, CHRONIC CONDITION (H): ICD-10-CM

## 2018-02-27 DIAGNOSIS — F31.9 BIPOLAR AFFECTIVE DISORDER, REMISSION STATUS UNSPECIFIED (H): Chronic | ICD-10-CM

## 2018-02-27 LAB
BASOPHILS # BLD AUTO: 0 10E9/L (ref 0–0.2)
BASOPHILS NFR BLD AUTO: 0.3 %
DIFFERENTIAL METHOD BLD: ABNORMAL
EOSINOPHIL # BLD AUTO: 0 10E9/L (ref 0–0.7)
EOSINOPHIL NFR BLD AUTO: 0 %
ERYTHROCYTE [DISTWIDTH] IN BLOOD BY AUTOMATED COUNT: 13.6 % (ref 10–15)
HCT VFR BLD AUTO: 35.7 % (ref 35–47)
HGB BLD-MCNC: 11.8 G/DL (ref 11.7–15.7)
LYMPHOCYTES # BLD AUTO: 1.9 10E9/L (ref 0.8–5.3)
LYMPHOCYTES NFR BLD AUTO: 21.2 %
MCH RBC QN AUTO: 33.4 PG (ref 26.5–33)
MCHC RBC AUTO-ENTMCNC: 33.1 G/DL (ref 31.5–36.5)
MCV RBC AUTO: 101 FL (ref 78–100)
MONOCYTES # BLD AUTO: 0.7 10E9/L (ref 0–1.3)
MONOCYTES NFR BLD AUTO: 7.6 %
NEUTROPHILS # BLD AUTO: 6.5 10E9/L (ref 1.6–8.3)
NEUTROPHILS NFR BLD AUTO: 70.9 %
PLATELET # BLD AUTO: 359 10E9/L (ref 150–450)
RBC # BLD AUTO: 3.53 10E12/L (ref 3.8–5.2)
WBC # BLD AUTO: 9.1 10E9/L (ref 4–11)

## 2018-02-27 PROCEDURE — 96372 THER/PROPH/DIAG INJ SC/IM: CPT

## 2018-02-27 PROCEDURE — 36415 COLL VENOUS BLD VENIPUNCTURE: CPT | Performed by: NURSE PRACTITIONER

## 2018-02-27 PROCEDURE — 80159 DRUG ASSAY CLOZAPINE: CPT | Mod: 90 | Performed by: NURSE PRACTITIONER

## 2018-02-27 PROCEDURE — 85025 COMPLETE CBC W/AUTO DIFF WBC: CPT

## 2018-02-27 PROCEDURE — 99207 ZZC NO CHARGE NURSE ONLY: CPT

## 2018-02-27 NOTE — MR AVS SNAPSHOT
After Visit Summary   2/27/2018    Doreen Gramajo    MRN: [id number]           Patient Information     Date Of Birth          1970        Visit Information        Provider Department      2/27/2018 9:30 AM [place] PI CMA/LPN [facility]        Today's Diagnoses     Bipolar affective disorder, remission status unspecified (H)        Schizophrenia, unspecified type (H)        Paranoid schizophrenia, subchronic condition with acute exacerbation (H)           Follow-ups after your visit        Your next 10 appointments already scheduled     Mar 20, 2018  9:30 AM CDT   Nurse Only with [place] PI CMA/LPN   [facility]    [address] [facility] [address]   [phone]            Mar 27, 2018  9:15 AM CDT   LAB with PI LAB   [facility] ([facility])    [address] [facility] [address]   [phone]           Please do not eat 10-12 hours before your appointment if you are coming in fasting for labs on lipids, cholesterol, or glucose (sugar). This does not apply to pregnant women. Water, hot tea and black coffee (with nothing added) are okay. Do not drink other fluids, diet soda or chew gum.            Apr 10, 2018  9:30 AM CDT   Nurse Only with [place] PI CMA/LPN   [facility] ([facility])    [address] [facility] [address]   [phone]            Apr 24, 2018  9:15 AM CDT   LAB with PI LAB   [facility] ([facility])    [address] [facility] [address]   [phone]           Please do not eat 10-12 hours before your appointment if you are coming in fasting for labs on lipids, cholesterol, or glucose (sugar). This does not apply to pregnant women. Water, hot tea and black coffee (with nothing added) are okay. Do not drink other fluids, diet soda or chew gum.              Who to contact     If you have questions or  "need follow up information about today's clinic visit or your schedule please contact [facility] directly at [phone].  Normal or non-critical lab and imaging results will be communicated to you by [employer] Shee[employer]art, letter or phone within 4 business days after the clinic has received the results. If you do not hear from us within 7 days, please contact the clinic through [employer] or phone. If you have a critical or abnormal lab result, we will notify you by phone as soon as possible.  Submit refill requests through icomply or call your pharmacy and they will forward the refill request to us. Please allow 3 business days for your refill to be completed.          Additional Information About Your Visit        The Electric SheepharJAMF Software Information     icomply lets you send messages to your doctor, view your test results, renew your prescriptions, schedule appointments and more. To sign up, go to [email or web address] . Click on \"Log in\" on the left side of the screen, which will take you to the Welcome page. Then click on \"Sign up Now\" on the right side of the page.     You will be asked to enter the access code listed below, as well as some personal information. Please follow the directions to create your username and password.     Your access code is: [email or web address]  Expires: 3/26/2018  7:15 PM     Your access code will  in 90 days. If you need help or a new code, please call your [place] clinic or [phone].        Care EveryWhere ID     This is your Care EveryWhere ID. This could be used by other organizations to access your [place] medical records  [id number]         Blood Pressure from Last 3 Encounters:   17 104/68   17 124/68   17 118/70    Weight from Last 3 Encounters:   17 139 lb (63 kg)   17 154 lb (69.9 kg)   17 171 lb (77.6 kg)              We Performed the Following     HALOPERIDOL DECANOATE INJ     INJECTION INTRAMUSCULAR OR SUB-Q        Primary Care Provider " Office Phone # Fax #    Josey Bonilla, [place] [phone]       [address] [facility] [address]        Equal Access to Services     JOURDAN MARTINEZ : Hadii aad ku hadirinacarisa Sobethany, waaxda luqadaha, qaybta kaalmada reid, alise teresain hayaafay dohertytesfayebijal giles. So [facility] [phone].    ATENCIÓN: Si habla español, tiene a mariano disposición servicios gratuitos de asistencia lingüística. Llame al [phone].    We comply with applicable federal civil rights laws and [place] laws. We do not discriminate on the basis of race, color, national origin, age, disability, sex, sexual orientation, or gender identity.            Thank you!     Thank you for choosing [facility]  for your care. Our goal is always to provide you with excellent care. Hearing back from our patients is one way we can continue to improve our services. Please take a few minutes to complete the written survey that you may receive in the mail after your visit with us. Thank you!             Your Updated Medication List - Protect others around you: Learn how to safely use, store and throw away your medicines at www.disposemymeds.org.          This list is accurate as of 2/27/18 10:36 AM.  Always use your most recent med list.                   Brand Name Dispense Instructions for use Diagnosis    albuterol 108 (90 BASE) MCG/ACT Inhaler    PROAIR HFA/PROVENTIL HFA/VENTOLIN HFA    1 Inhaler    Inhale 2 puffs into the lungs every 6 hours (PRN for shortness of breath)    Asthma, intermittent, uncomplicated       atropine 0.1 mg/mL Susp suspension      Take by mouth At Bedtime        B Complex-C Tabs     30 tablet    Take 1 tablet by mouth daily    Iron deficiency       CERTAVITE/ANTIOXIDANTS Tabs tablet   Generic drug:  multivitamin, therapeutic with minerals     30 tablet    TAKE ONE TABLET BY MOUTH EVERY DAY    Schizoaffective disorder, unspecified type (H)       Clozapine 200 MG tablet    CLOZARIL     100 mg  at breakfast and 650 mg after dinner        DEPEND UNDERGARMENTS Misc     31 each    1 each daily as needed MEDIUM sized briefs    Urinary incontinence, unspecified type       DIPHENDRYL PO      Take 50 mg by mouth every 3 hours as needed        Ferrous Sulfate 324 (65 FE) MG Tbec     60 tablet    Take 1 tablet (325 mg) by mouth 2 times daily    Iron deficiency       haloperidol decanoate 100 MG/ML injection    HALDOL DECANOATE    1 mL    Inject 100 mg into the muscle every 21 days Order scanned into [facility], Order from Jody Schoenecker ([facility]) ph:[phone] Received on 12/05/2017. Refills 12.    Schizophrenia, unspecified type (H), Bipolar affective disorder, remission status unspecified (H)       * HALOPERIDOL PO      Take 5 mg by mouth every 3 hours as needed for agitation (up to three daily)        * HALOPERIDOL PO      Take 10 mg by mouth At Bedtime        IBUPROFEN PO      Take 400 mg by mouth every 4 hours as needed for moderate pain        levothyroxine 50 MCG tablet    SYNTHROID/LEVOTHROID    30 tablet    Take 1 tablet (50 mcg) by mouth daily    Hypothyroidism, unspecified type       LEXAPRO PO      Take 20 mg by mouth daily        lithium 300 MG tablet      Take 300 mg by mouth 2 times daily        LORAZEPAM PO      Take 1 mg by mouth every 3 hours as needed for anxiety        * order for DME     1 each    INCONTINENT PRODUCTS (UP / MONTH)    Chronic constipation, Schizophrenia, unspecified type (H), Schizoaffective disorder, unspecified type (H), Disturbance of conduct, Extrapyramidal symptom, Urinary incontinence, unspecified type, Bipolar affective disorder, remission status unspecified (H)       * order for DME     1 each    GLOVES    Chronic constipation, Schizophrenia, unspecified type (H), Schizoaffective disorder, unspecified type (H), Disturbance of conduct, Extrapyramidal symptom, Urinary incontinence, unspecified type, Bipolar affective disorder, remission status  unspecified (H)       * order for DME     1 each    INCONTINENT PERSONAL WIPES    Chronic constipation, Schizophrenia, unspecified type (H), Schizoaffective disorder, unspecified type (H), Disturbance of conduct, Extrapyramidal symptom, Urinary incontinence, unspecified type, Bipolar affective disorder, remission status unspecified (H)       * order for DME     1 each    CHUX PADS    Chronic constipation, Schizophrenia, unspecified type (H), Schizoaffective disorder, unspecified type (H), Disturbance of conduct, Extrapyramidal symptom, Urinary incontinence, unspecified type, Bipolar affective disorder, remission status unspecified (H)       pantoprazole 20 MG EC tablet    PROTONIX    30 tablet    TAKE ONE TABLET BY MOUTH EVERY DAY    Gastroesophageal reflux disease without esophagitis       polyethylene glycol powder    MIRALAX    510 g    Take 17 g (1 capful) by mouth daily    Chronic constipation       sennosides 8.6 MG tablet    SENOKOT    200 tablet    TAKE THREE TABLETS BY MOUTH TWICE DAILY    Chronic constipation       simethicone 80 MG chewable tablet    MYLICON    180 tablet    Take 1 tablet (80 mg) by mouth every 6 hours as needed for flatulence or cramping PRN    Flatulence, eructation and gas pain       TRAZODONE HCL PO      Take 200 mg by mouth At Bedtime        * Notice:  This list has 6 medication(s) that are the same as other medications prescribed for you. Read the directions carefully, and ask your doctor or other care provider to review them with you.

## 2018-02-28 DIAGNOSIS — Z79.899 ENCOUNTER FOR LONG-TERM (CURRENT) USE OF MEDICATIONS: Primary | ICD-10-CM

## 2018-03-01 LAB
CLOZAPINE AND METABOLITES TOTAL: 611 NG/ML
CLOZAPINE SERPL-MCNC: 373 NG/ML
CLOZAPINE-N-OXIDE QUANT: <100 NG/ML
NORCLOZAPINE SERPL-MCNC: 238 NG/ML

## 2018-03-09 DIAGNOSIS — E03.9 HYPOTHYROIDISM, UNSPECIFIED TYPE: ICD-10-CM

## 2018-03-09 DIAGNOSIS — E61.1 IRON DEFICIENCY: ICD-10-CM

## 2018-03-09 DIAGNOSIS — F25.9 SCHIZOAFFECTIVE DISORDER, UNSPECIFIED TYPE (H): Chronic | ICD-10-CM

## 2018-03-12 NOTE — TELEPHONE ENCOUNTER
"Requested Prescriptions   Pending Prescriptions Disp Refills     CERTAVITE/ANTIOXIDANTS TABS tablet [Pharmacy Med Name: CERTAVITE-ANTIOXIDANT 18MG-0.4MG TABLET] 30 tablet      Sig: TAKE ONE TABLET BY MOUTH EVERY DAY    Vitamin Supplements (Adult) Protocol Passed    3/9/2018  4:16 PM       Passed - High dose Vitamin D not ordered       Passed - Recent (12 mo) or future (30 days) visit within the authorizing provider's specialty    Patient had office visit in the last 12 months or has a visit in the next 30 days with authorizing provider or within the authorizing provider's specialty.  See \"Patient Info\" tab in inbasket, or \"Choose Columns\" in Meds & Orders section of the refill encounter.     Last Written Prescription Date:  11/22/17  Last Fill Quantity: 30,  # refills: 0   Last office visit: 11/14/17     Future Office Visit:   Next 5 appointments (look out 90 days)     Mar 20, 2018  9:30 AM CDT   Nurse Only with [place] PI GREY/LPN   [facility]    [address] [place] [address]   [phone]            Apr 10, 2018  9:30 AM CDT   Nurse Only with [place] PI GREY/LPN   [facility]    [address] [place] [address]   [phone]                            Ferrous Sulfate 324 (65 FE) MG TBEC [Pharmacy Med Name: FERROUS SULFATE 324(65)MG TABLET DR] 60 tablet      Sig: TAKE ONE TABLET BY MOUTH TWICE DAILY    There is no refill protocol information for this order    Ferrous Sulfate 324 (65 FE) MG TBEC      Last Written Prescription Date:  11/22/17  Last Fill Quantity: 60,   # refills: 3  Last Office Visit: 11/14/17  Future Office visit:    Next 5 appointments (look out 90 days)     Mar 20, 2018  9:30 AM CDT   Nurse Only with [place] PI GREY/LPN   [facility] ([address] [address]   [phone]            Apr 10, 2018  9:30 AM CDT   Nurse Only with [place] " "PI GREY/LPN   [facility]    [address] [facility] [address]   [phone]                   Routing refill request to provider for review/approval because:  Drug not on the [facility] [facility] or [facility] refill protocol or controlled substance          levothyroxine (SYNTHROID/LEVOTHROID) 50 MCG tablet [Pharmacy Med Name: LEVOTHYROXINE SODIUM 50 MCG TABLET] 30 tablet      Sig: Take 1 tablet (50 mcg) by mouth daily    Thyroid Protocol Passed    3/9/2018  4:16 PM       Passed - Patient is 12 years or older       Passed - Recent (12 mo) or future (30 days) visit within the authorizing provider's specialty    Patient had office visit in the last 12 months or has a visit in the next 30 days with authorizing provider or within the authorizing provider's specialty.  See \"Patient Info\" tab in inbasket, or \"Choose Columns\" in Meds & Orders section of the refill encounter.      Last Written Prescription Date:  6/1/17  Last Fill Quantity: 30,  # refills: 10   Last office visit: 2/27/2018 with prescribing provider:  11/14/17   Future Office Visit:   Next 5 appointments (look out 90 days)     Mar 20, 2018  9:30 AM CDT   Nurse Only with [place] PI GREY/LPN   [facility]    [address] [facility] [address]   [phone]            Apr 10, 2018  9:30 AM CDT   Nurse Only with [place] PI GREY/LPN   [facility]    [address] [facility] [address]   [phone]                        Passed - Normal TSH on file in past 12 months    Recent Labs   Lab Test  10/10/17   0909   TSH  3.02             Passed - No active pregnancy on record    If patient is pregnant or has had a positive pregnancy test, please check TSH.         Passed - No positive pregnancy test in past 12 months    If patient is pregnant or has had a positive pregnancy test, please check TSH.            "

## 2018-03-13 RX ORDER — LEVOTHYROXINE SODIUM 50 UG/1
TABLET ORAL
Qty: 30 TABLET | Refills: 5 | Status: SHIPPED | OUTPATIENT
Start: 2018-03-13 | End: 2018-07-26

## 2018-03-13 RX ORDER — FOLIC ACID/MV,IRON,MIN/LUTEIN 0.4-18-25
TABLET ORAL
Qty: 30 TABLET | Refills: 5 | Status: SHIPPED | OUTPATIENT
Start: 2018-03-13 | End: 2018-09-04

## 2018-03-13 RX ORDER — FERROUS SULFATE 324(65)MG
TABLET, DELAYED RELEASE (ENTERIC COATED) ORAL
Qty: 60 TABLET | Refills: 5 | Status: SHIPPED | OUTPATIENT
Start: 2018-03-13 | End: 2018-07-26

## 2018-03-20 ENCOUNTER — ALLIED HEALTH/NURSE VISIT (OUTPATIENT)
Dept: FAMILY MEDICINE | Facility: CLINIC | Age: 48
End: 2018-03-20
Payer: MEDICARE

## 2018-03-20 DIAGNOSIS — F20.0 PARANOID SCHIZOPHRENIA, SUBCHRONIC CONDITION WITH ACUTE EXACERBATION (H): ICD-10-CM

## 2018-03-20 DIAGNOSIS — F31.9 BIPOLAR AFFECTIVE DISORDER, REMISSION STATUS UNSPECIFIED (H): Chronic | ICD-10-CM

## 2018-03-20 DIAGNOSIS — F20.9 SCHIZOPHRENIA, UNSPECIFIED TYPE (H): Chronic | ICD-10-CM

## 2018-03-20 PROCEDURE — 96372 THER/PROPH/DIAG INJ SC/IM: CPT

## 2018-03-20 PROCEDURE — 99207 ZZC NO CHARGE NURSE ONLY: CPT

## 2018-03-20 NOTE — MR AVS SNAPSHOT
After Visit Summary   3/20/2018    Doreen Gramajo    MRN: [id number]           Patient Information     Date Of Birth          1970        Visit Information        Provider Department      3/20/2018 9:30 AM FL RALF EDMONDS/LPN [facility]        Today's Diagnoses     Bipolar affective disorder, remission status unspecified (H)        Schizophrenia, unspecified type (H)        Paranoid schizophrenia, subchronic condition with acute exacerbation (H)           Follow-ups after your visit        Your next 10 appointments already scheduled     Mar 27, 2018  9:15 AM CDT   LAB with [facility] LAB   [facility]    [address] [place] [address]   [phone]           Please do not eat 10-12 hours before your appointment if you are coming in fasting for labs on lipids, cholesterol, or glucose (sugar). This does not apply to pregnant women. Water, hot tea and black coffee (with nothing added) are okay. Do not drink other fluids, diet soda or chew gum.            Apr 10, 2018  9:30 AM CDT   Nurse Only with [place] PI CMA/LPN   [facility]    [address] [place] [id number] [phone]            Apr 24, 2018  9:15 AM CDT   LAB with PI LAB   [facility]    [address] [place] [id number] [phone]           Please do not eat 10-12 hours before your appointment if you are coming in fasting for labs on lipids, cholesterol, or glucose (sugar). This does not apply to pregnant women. Water, hot tea and black coffee (with nothing added) are okay. Do not drink other fluids, diet soda or chew gum.              Who to contact     If you have questions or need follow up information about today's clinic visit or your schedule please contact [facility] directly at [phone].  Normal or non-critical lab and imaging results  "will be communicated to you by MyChart, letter or phone within 4 business days after the clinic has received the results. If you do not hear from us within 7 days, please contact the clinic through [employer] or phone. If you have a critical or abnormal lab result, we will notify you by phone as soon as possible.  Submit refill requests through [employer] or call your pharmacy and they will forward the refill request to us. Please allow 3 business days for your refill to be completed.          Additional Information About Your Visit        [employer] Information     [employer] lets you send messages to your doctor, view your test results, renew your prescriptions, schedule appointments and more. To sign up, go to [email or web address] . Click on \"Log in\" on the left side of the screen, which will take you to the Welcome page. Then click on \"Sign up Now\" on the right side of the page.     You will be asked to enter the access code listed below, as well as some personal information. Please follow the directions to create your username and password.     Your access code is: [email or web address]  Expires: 3/26/2018  8:15 PM     Your access code will  in 90 days. If you need help or a new code, please call your [facility] clinic or [phone].        Care EveryWhere ID     This is your Care EveryWhere ID. This could be used by other organizations to access your [facility] medical records  [id number]         Blood Pressure from Last 3 Encounters:   17 104/68   17 124/68   17 118/70    Weight from Last 3 Encounters:   17 139 lb (63 kg)   17 154 lb (69.9 kg)   17 171 lb (77.6 kg)              We Performed the Following     HALOPERIDOL DECANOATE INJ     INJECTION INTRAMUSCULAR OR SUB-Q        Primary Care Provider Office Phone # Fax #    Josey Bonilla [phone]       [address] [facility] [address]        Equal Access to Services     JOURDAN MARTINEZ AH: Hadii aad ku " dillan Rodriguez, wakaydenda luqadaha, qaybta kaalmada reid, alise keene bessyced laShaziapierre tisha. So [facility] [phone].    ATENCIÓN: Si sonla daphne, tiene a mariano disposición servicios gratuitos de asistencia lingüística. Uyen al [phone].    We comply with applicable federal civil rights laws and [place] laws. We do not discriminate on the basis of race, color, national origin, age, disability, sex, sexual orientation, or gender identity.            Thank you!     Thank you for choosing [facility]  for your care. Our goal is always to provide you with excellent care. Hearing back from our patients is one way we can continue to improve our services. Please take a few minutes to complete the written survey that you may receive in the mail after your visit with us. Thank you!             Your Updated Medication List - Protect others around you: Learn how to safely use, store and throw away your medicines at www.disposemymeds.org.          This list is accurate as of 3/20/18 12:12 PM.  Always use your most recent med list.                   Brand Name Dispense Instructions for use Diagnosis    albuterol 108 (90 BASE) MCG/ACT Inhaler    PROAIR HFA/PROVENTIL HFA/VENTOLIN HFA    1 Inhaler    Inhale 2 puffs into the lungs every 6 hours (PRN for shortness of breath)    Asthma, intermittent, uncomplicated       atropine 0.1 mg/mL Susp suspension      Take by mouth At Bedtime        B Complex-C Tabs     30 tablet    Take 1 tablet by mouth daily    Iron deficiency       CERTAVITE/ANTIOXIDANTS Tabs tablet   Generic drug:  multivitamin, therapeutic with minerals     30 tablet    TAKE ONE TABLET BY MOUTH EVERY DAY    Schizoaffective disorder, unspecified type (H)       Clozapine 200 MG tablet    CLOZARIL     100 mg at breakfast and 650 mg after dinner        DEPEND UNDERGARMENTS Misc     31 each    1 each daily as needed MEDIUM sized briefs    Urinary incontinence, unspecified type       DIPHENDRYL PO       Take 50 mg by mouth every 3 hours as needed        Ferrous Sulfate 324 (65 FE) MG Tbec     60 tablet    TAKE ONE TABLET BY MOUTH TWICE DAILY    Iron deficiency       haloperidol decanoate 100 MG/ML injection    HALDOL DECANOATE    1 mL    Inject 100 mg into the muscle every 21 days Order scanned into [facility], Order from Jody Schoenecker ([facility]) ph:[phone] Received on 12/05/2017. Refills 12.    Schizophrenia, unspecified type (H), Bipolar affective disorder, remission status unspecified (H)       * HALOPERIDOL PO      Take 5 mg by mouth every 3 hours as needed for agitation (up to three daily)        * HALOPERIDOL PO      Take 10 mg by mouth At Bedtime        IBUPROFEN PO      Take 400 mg by mouth every 4 hours as needed for moderate pain        levothyroxine 50 MCG tablet    SYNTHROID/LEVOTHROID    30 tablet    Take 1 tablet (50 mcg) by mouth daily    Hypothyroidism, unspecified type       LEXAPRO PO      Take 20 mg by mouth daily        lithium 300 MG tablet      Take 300 mg by mouth 2 times daily        LORAZEPAM PO      Take 1 mg by mouth every 3 hours as needed for anxiety        * order for DME     1 each    INCONTINENT PRODUCTS (UP / MONTH)    Chronic constipation, Schizophrenia, unspecified type (H), Schizoaffective disorder, unspecified type (H), Disturbance of conduct, Extrapyramidal symptom, Urinary incontinence, unspecified type, Bipolar affective disorder, remission status unspecified (H)       * order for DME     1 each    GLOVES    Chronic constipation, Schizophrenia, unspecified type (H), Schizoaffective disorder, unspecified type (H), Disturbance of conduct, Extrapyramidal symptom, Urinary incontinence, unspecified type, Bipolar affective disorder, remission status unspecified (H)       * order for DME     1 each    INCONTINENT PERSONAL WIPES    Chronic constipation, Schizophrenia, unspecified type (H), Schizoaffective disorder, unspecified type (H), Disturbance  of conduct, Extrapyramidal symptom, Urinary incontinence, unspecified type, Bipolar affective disorder, remission status unspecified (H)       * order for DME     1 each    CHUX PADS    Chronic constipation, Schizophrenia, unspecified type (H), Schizoaffective disorder, unspecified type (H), Disturbance of conduct, Extrapyramidal symptom, Urinary incontinence, unspecified type, Bipolar affective disorder, remission status unspecified (H)       pantoprazole 20 MG EC tablet    PROTONIX    30 tablet    TAKE ONE TABLET BY MOUTH EVERY DAY    Gastroesophageal reflux disease without esophagitis       polyethylene glycol powder    MIRALAX    510 g    Take 17 g (1 capful) by mouth daily    Chronic constipation       sennosides 8.6 MG tablet    SENOKOT    200 tablet    TAKE THREE TABLETS BY MOUTH TWICE DAILY    Chronic constipation       simethicone 80 MG chewable tablet    MYLICON    180 tablet    Take 1 tablet (80 mg) by mouth every 6 hours as needed for flatulence or cramping PRN    Flatulence, eructation and gas pain       TRAZODONE HCL PO      Take 200 mg by mouth At Bedtime        * Notice:  This list has 6 medication(s) that are the same as other medications prescribed for you. Read the directions carefully, and ask your doctor or other care provider to review them with you.

## 2018-03-27 DIAGNOSIS — Z79.899 ENCOUNTER FOR LONG-TERM (CURRENT) USE OF MEDICATIONS: ICD-10-CM

## 2018-03-27 DIAGNOSIS — Z79.899 NEED FOR PROPHYLACTIC CHEMOTHERAPY: ICD-10-CM

## 2018-03-27 LAB
BASOPHILS # BLD AUTO: 0 10E9/L (ref 0–0.2)
BASOPHILS NFR BLD AUTO: 0.3 %
DIFFERENTIAL METHOD BLD: ABNORMAL
EOSINOPHIL # BLD AUTO: 0 10E9/L (ref 0–0.7)
EOSINOPHIL NFR BLD AUTO: 0 %
ERYTHROCYTE [DISTWIDTH] IN BLOOD BY AUTOMATED COUNT: 13.6 % (ref 10–15)
HCT VFR BLD AUTO: 35.9 % (ref 35–47)
HGB BLD-MCNC: 12.3 G/DL (ref 11.7–15.7)
LYMPHOCYTES # BLD AUTO: 1.9 10E9/L (ref 0.8–5.3)
LYMPHOCYTES NFR BLD AUTO: 18.4 %
MCH RBC QN AUTO: 34.4 PG (ref 26.5–33)
MCHC RBC AUTO-ENTMCNC: 34.3 G/DL (ref 31.5–36.5)
MCV RBC AUTO: 100 FL (ref 78–100)
MONOCYTES # BLD AUTO: 0.9 10E9/L (ref 0–1.3)
MONOCYTES NFR BLD AUTO: 8.5 %
NEUTROPHILS # BLD AUTO: 7.3 10E9/L (ref 1.6–8.3)
NEUTROPHILS NFR BLD AUTO: 72.8 %
PLATELET # BLD AUTO: 348 10E9/L (ref 150–450)
RBC # BLD AUTO: 3.58 10E12/L (ref 3.8–5.2)
WBC # BLD AUTO: 10.1 10E9/L (ref 4–11)

## 2018-03-27 PROCEDURE — 85025 COMPLETE CBC W/AUTO DIFF WBC: CPT | Performed by: NURSE PRACTITIONER

## 2018-03-27 PROCEDURE — 80159 DRUG ASSAY CLOZAPINE: CPT | Mod: 90 | Performed by: NURSE PRACTITIONER

## 2018-03-27 PROCEDURE — 36415 COLL VENOUS BLD VENIPUNCTURE: CPT | Performed by: NURSE PRACTITIONER

## 2018-03-28 LAB
CLOZAPINE AND METABOLITES TOTAL: 837 NG/ML
CLOZAPINE SERPL-MCNC: 458 NG/ML
CLOZAPINE-N-OXIDE QUANT: 100 NG/ML
NORCLOZAPINE SERPL-MCNC: 279 NG/ML

## 2018-04-10 ENCOUNTER — ALLIED HEALTH/NURSE VISIT (OUTPATIENT)
Dept: FAMILY MEDICINE | Facility: CLINIC | Age: 48
End: 2018-04-10
Payer: MEDICARE

## 2018-04-10 DIAGNOSIS — F31.9 BIPOLAR AFFECTIVE DISORDER, REMISSION STATUS UNSPECIFIED (H): Chronic | ICD-10-CM

## 2018-04-10 DIAGNOSIS — F20.0 PARANOID SCHIZOPHRENIA, SUBCHRONIC CONDITION WITH ACUTE EXACERBATION (H): ICD-10-CM

## 2018-04-10 DIAGNOSIS — F20.9 SCHIZOPHRENIA, UNSPECIFIED TYPE (H): Chronic | ICD-10-CM

## 2018-04-10 PROCEDURE — 96372 THER/PROPH/DIAG INJ SC/IM: CPT

## 2018-04-10 PROCEDURE — 99207 ZZC NO CHARGE NURSE ONLY: CPT

## 2018-04-16 DIAGNOSIS — J45.20 ASTHMA, INTERMITTENT, UNCOMPLICATED: Chronic | ICD-10-CM

## 2018-04-16 RX ORDER — ALBUTEROL SULFATE 90 UG/1
AEROSOL, METERED RESPIRATORY (INHALATION)
Qty: 18 INHALER | Refills: 0 | Status: SHIPPED | OUTPATIENT
Start: 2018-04-16 | End: 2018-06-20

## 2018-04-16 NOTE — TELEPHONE ENCOUNTER
"Requested Prescriptions   Pending Prescriptions Disp Refills     VENTOLIN  (90 Base) MCG/ACT Inhaler [Pharmacy Med Name: VENTOLIN HFA 90 MCG HFA AER AD] 18 g      Sig: Inhale 2 puffs into the lungs every 6 hours (PRN for shortness of breath)    Asthma Maintenance Inhalers - Anticholinergics Failed    4/16/2018  3:31 PM       Failed - Asthma control assessment score within normal limits in last 6 months    Please review ACT score.          Passed - Patient is age 12 years or older       Passed - Recent (6 mo) or future (30 days) visit within the authorizing provider's specialty    Patient had office visit in the last 6 months or has a visit in the next 30 days with authorizing provider or within the authorizing provider's specialty.  See \"Patient Info\" tab in inbasket, or \"Choose Columns\" in Meds & Orders section of the refill encounter.            albuterol (PROAIR HFA/PROVENTIL HFA/VENTOLIN HFA) 108 (90 BASE) MCG/ACT Inhaler  Last Written Prescription Date:  03/30/2017  Last Fill Quantity: 1 inhaler,  # refills: 3   Last office visit: 4/10/2018 with prescribing provider:  11/14/2017 TAMICA Bonilla   Future Office Visit:      ACT Total Scores 11/22/2016 1/31/2017 7/18/2017   ACT TOTAL SCORE - - -   ASTHMA ER VISITS - - -   ASTHMA HOSPITALIZATIONS - - -   ACT TOTAL SCORE (Goal Greater than or Equal to 20) 22 22 22   In the past 12 months, how many times did you visit the emergency room for your asthma without being admitted to the hospital? 0 0 0   In the past 12 months, how many times were you hospitalized overnight because of your asthma? 0 0 0     Yolie Chowdhury RT (R) (M)    "

## 2018-04-24 DIAGNOSIS — Z79.899 ENCOUNTER FOR LONG-TERM (CURRENT) USE OF MEDICATIONS: ICD-10-CM

## 2018-04-24 DIAGNOSIS — Z79.899 NEED FOR PROPHYLACTIC CHEMOTHERAPY: ICD-10-CM

## 2018-04-24 LAB
BASOPHILS # BLD AUTO: 0 10E9/L (ref 0–0.2)
BASOPHILS NFR BLD AUTO: 0.2 %
DIFFERENTIAL METHOD BLD: ABNORMAL
EOSINOPHIL # BLD AUTO: 0 10E9/L (ref 0–0.7)
EOSINOPHIL NFR BLD AUTO: 0 %
ERYTHROCYTE [DISTWIDTH] IN BLOOD BY AUTOMATED COUNT: 14.2 % (ref 10–15)
HCT VFR BLD AUTO: 38.6 % (ref 35–47)
HGB BLD-MCNC: 12.5 G/DL (ref 11.7–15.7)
LYMPHOCYTES # BLD AUTO: 1.8 10E9/L (ref 0.8–5.3)
LYMPHOCYTES NFR BLD AUTO: 14.2 %
MCH RBC QN AUTO: 33.3 PG (ref 26.5–33)
MCHC RBC AUTO-ENTMCNC: 32.4 G/DL (ref 31.5–36.5)
MCV RBC AUTO: 103 FL (ref 78–100)
MONOCYTES # BLD AUTO: 0.7 10E9/L (ref 0–1.3)
MONOCYTES NFR BLD AUTO: 5.5 %
NEUTROPHILS # BLD AUTO: 9.9 10E9/L (ref 1.6–8.3)
NEUTROPHILS NFR BLD AUTO: 80.1 %
PLATELET # BLD AUTO: 369 10E9/L (ref 150–450)
RBC # BLD AUTO: 3.75 10E12/L (ref 3.8–5.2)
WBC # BLD AUTO: 12.4 10E9/L (ref 4–11)

## 2018-04-24 PROCEDURE — 80159 DRUG ASSAY CLOZAPINE: CPT | Mod: 90 | Performed by: NURSE PRACTITIONER

## 2018-04-24 PROCEDURE — 85025 COMPLETE CBC W/AUTO DIFF WBC: CPT | Performed by: NURSE PRACTITIONER

## 2018-04-24 PROCEDURE — 36415 COLL VENOUS BLD VENIPUNCTURE: CPT | Performed by: NURSE PRACTITIONER

## 2018-04-26 LAB
CLOZAPINE AND METABOLITES TOTAL: 744 NG/ML
CLOZAPINE SERPL-MCNC: 411 NG/ML
CLOZAPINE-N-OXIDE QUANT: 102 NG/ML
NORCLOZAPINE SERPL-MCNC: 231 NG/ML

## 2018-05-01 ENCOUNTER — ALLIED HEALTH/NURSE VISIT (OUTPATIENT)
Dept: FAMILY MEDICINE | Facility: CLINIC | Age: 48
End: 2018-05-01
Payer: MEDICARE

## 2018-05-01 DIAGNOSIS — F20.9 SCHIZOPHRENIA, UNSPECIFIED TYPE (H): Chronic | ICD-10-CM

## 2018-05-01 DIAGNOSIS — F20.0 PARANOID SCHIZOPHRENIA, SUBCHRONIC CONDITION WITH ACUTE EXACERBATION (H): ICD-10-CM

## 2018-05-01 DIAGNOSIS — F31.9 BIPOLAR AFFECTIVE DISORDER, REMISSION STATUS UNSPECIFIED (H): Chronic | ICD-10-CM

## 2018-05-01 PROCEDURE — 99207 ZZC NO CHARGE NURSE ONLY: CPT

## 2018-05-01 PROCEDURE — 96372 THER/PROPH/DIAG INJ SC/IM: CPT

## 2018-05-01 NOTE — PROGRESS NOTES
Prior to injection verified patient identity using patient's name and date of birth.  Due to injection administration, patient instructed to remain in clinic for 15 minutes  afterwards, and to report any adverse reaction to me immediately.  DIANA Suhkla RN

## 2018-05-01 NOTE — MR AVS SNAPSHOT
After Visit Summary   5/1/2018    Doreen Gramajo    MRN: [id number]           Patient Information     Date Of Birth          1970        Visit Information        Provider Department      5/1/2018 10:45 AM [place] PI CMA/LPN [facility]        Today's Diagnoses     Bipolar affective disorder, remission status unspecified (H)        Schizophrenia, unspecified type (H)        Paranoid schizophrenia, subchronic condition with acute exacerbation (H)           Follow-ups after your visit        Your next 10 appointments already scheduled     May 22, 2018  9:15 AM CDT   [facility]   [facility]    [address] [place] [address]   [phone]           Please do not eat 10-12 hours before your appointment if you are coming in fasting for labs on lipids, cholesterol, or glucose (sugar). This does not apply to pregnant women. Water, hot tea and black coffee (with nothing added) are okay. Do not drink other fluids, diet soda or chew gum.            May 22, 2018  9:30 AM CDT   Nurse Only with [place] PI C[facility]LP[facility]   [facility]    [address] [place] [address]   [phone]            Jun 12, 2018  9:30 AM CDT   Nurse Only with [place] PI [facility]    [address] [facility] [address]   [phone]            Jun 19, 2018  9:15 AM CDT   LAB with [facility] LAB   [facility]    [address] [place] [address]   [phone]           Please do not eat 10-12 hours before your appointment if you are coming in fasting for labs on lipids, cholesterol, or glucose (sugar). This does not apply to pregnant women. Water, hot tea and black coffee (with nothing added) are okay. Do not drink other fluids, diet soda or chew gum.              Who to contact     If you have questions or  "need follow up information about today's clinic visit or your schedule please contact [facility] directly at [phone].  Normal or non-critical lab and imaging results will be communicated to you by [employer], letter or phone within 4 business days after the clinic has received the results. If you do not hear from us within 7 days, please contact the clinic through [employer] or phone. If you have a critical or abnormal lab result, we will notify you by phone as soon as possible.  Submit refill requests through [employer] or call your pharmacy and they will forward the refill request to us. Please allow 3 business days for your refill to be completed.          Additional Information About Your Visit        [employer] Information     [employer] lets you send messages to your doctor, view your test results, renew your prescriptions, schedule appointments and more. To sign up, go to [email or web address] . Click on \"Log in\" on the left side of the screen, which will take you to the Welcome page. Then click on \"Sign up Now\" on the right side of the page.     You will be asked to enter the access code listed below, as well as some personal information. Please follow the directions to create your username and password.     Your access code is: [email or web address]  Expires: 2018 11:46 AM     Your access code will  in 90 days. If you need help or a new code, please call your [place] clinic or [phone].        Care EveryWhere ID     This is your Care EveryWhere ID. This could be used by other organizations to access your [place] medical records  [id number]         Blood Pressure from Last 3 Encounters:   17 104/68   17 124/68   17 118/70    Weight from Last 3 Encounters:   17 139 lb (63 kg)   17 154 lb (69.9 kg)   17 171 lb (77.6 kg)              We Performed the Following     HALOPERIDOL DECANOATE INJ     INJECTION INTRAMUSCULAR OR SUB-Q        Primary Care Provider " Office Phone # Fax #    Josey Bonilla, [facility] [phone]       [address] [facility] [address]        Equal Access to Services     JOURDAN MARTINEZ : Aylin Rodriguez, waaxda luqadaha, qaybta kaalmada reid, alise teresain hayaafay dohertytesfayebijal giles. So [facility] [phone].    ATENCIÓN: Si habla español, tiene a mariano disposición servicios gratuitos de asistencia lingüística. Llame al [phone].    We comply with applicable federal civil rights laws and [place] laws. We do not discriminate on the basis of race, color, national origin, age, disability, sex, sexual orientation, or gender identity.            Thank you!     Thank you for choosing [facility]  for your care. Our goal is always to provide you with excellent care. Hearing back from our patients is one way we can continue to improve our services. Please take a few minutes to complete the written survey that you may receive in the mail after your visit with us. Thank you!             Your Updated Medication List - Protect others around you: Learn how to safely use, store and throw away your medicines at www.disposemymeds.org.          This list is accurate as of 5/1/18 11:25 AM.  Always use your most recent med list.                   Brand Name Dispense Instructions for use Diagnosis    atropine 0.1 mg/mL Susp suspension      Take by mouth At Bedtime        B Complex-C Tabs     30 tablet    Take 1 tablet by mouth daily    Iron deficiency       CERTAVITE/ANTIOXIDANTS Tabs tablet   Generic drug:  multivitamin, therapeutic with minerals     30 tablet    TAKE ONE TABLET BY MOUTH EVERY DAY    Schizoaffective disorder, unspecified type (H)       Clozapine 200 MG tablet    CLOZARIL     100 mg at breakfast and 650 mg after dinner        DEPEND UNDERGARMENTS Misc     31 each    1 each daily as needed MEDIUM sized briefs    Urinary incontinence, unspecified type       DIPHENDRYL PO      Take 50 mg by mouth  every 3 hours as needed        Ferrous Sulfate 324 (65 Fe) MG Tbec     60 tablet    TAKE ONE TABLET BY MOUTH TWICE DAILY    Iron deficiency       haloperidol decanoate 100 MG/ML injection    HALDOL DECANOATE    1 mL    Inject 100 mg into the muscle every 21 days Order scanned into [facility], Order from Jody Schoenecker ([facility]) ph:[phone] Received on 12/05/2017. Refills 12.    Schizophrenia, unspecified type (H), Bipolar affective disorder, remission status unspecified (H)       * HALOPERIDOL PO      Take 5 mg by mouth every 3 hours as needed for agitation (up to three daily)        * HALOPERIDOL PO      Take 10 mg by mouth At Bedtime        IBUPROFEN PO      Take 400 mg by mouth every 4 hours as needed for moderate pain        levothyroxine 50 MCG tablet    SYNTHROID/LEVOTHROID    30 tablet    Take 1 tablet (50 mcg) by mouth daily    Hypothyroidism, unspecified type       LEXAPRO PO      Take 20 mg by mouth daily        lithium 300 MG tablet      Take 300 mg by mouth 2 times daily        LORAZEPAM PO      Take 1 mg by mouth every 3 hours as needed for anxiety        * order for DME     1 each    INCONTINENT PRODUCTS (UP / MONTH)    Chronic constipation, Schizophrenia, unspecified type (H), Schizoaffective disorder, unspecified type (H), Disturbance of conduct, Extrapyramidal symptom, Urinary incontinence, unspecified type, Bipolar affective disorder, remission status unspecified (H)       * order for DME     1 each    GLOVES    Chronic constipation, Schizophrenia, unspecified type (H), Schizoaffective disorder, unspecified type (H), Disturbance of conduct, Extrapyramidal symptom, Urinary incontinence, unspecified type, Bipolar affective disorder, remission status unspecified (H)       * order for DME     1 each    INCONTINENT PERSONAL WIPES    Chronic constipation, Schizophrenia, unspecified type (H), Schizoaffective disorder, unspecified type (H), Disturbance of conduct,  Extrapyramidal symptom, Urinary incontinence, unspecified type, Bipolar affective disorder, remission status unspecified (H)       * order for DME     1 each    CHUX PADS    Chronic constipation, Schizophrenia, unspecified type (H), Schizoaffective disorder, unspecified type (H), Disturbance of conduct, Extrapyramidal symptom, Urinary incontinence, unspecified type, Bipolar affective disorder, remission status unspecified (H)       pantoprazole 20 MG EC tablet    PROTONIX    30 tablet    TAKE ONE TABLET BY MOUTH EVERY DAY    Gastroesophageal reflux disease without esophagitis       polyethylene glycol powder    MIRALAX    510 g    Take 17 g (1 capful) by mouth daily    Chronic constipation       sennosides 8.6 MG tablet    SENOKOT    200 tablet    TAKE THREE TABLETS BY MOUTH TWICE DAILY    Chronic constipation       simethicone 80 MG chewable tablet    MYLICON    180 tablet    Take 1 tablet (80 mg) by mouth every 6 hours as needed for flatulence or cramping PRN    Flatulence, eructation and gas pain       TRAZODONE HCL PO      Take 200 mg by mouth At Bedtime        VENTOLIN  (90 Base) MCG/ACT Inhaler   Generic drug:  albuterol     18 Inhaler    Inhale 2 puffs into the lungs every 6 hours (PRN for shortness of breath)    Asthma, intermittent, uncomplicated       * Notice:  This list has 6 medication(s) that are the same as other medications prescribed for you. Read the directions carefully, and ask your doctor or other care provider to review them with you.

## 2018-05-17 ENCOUNTER — OFFICE VISIT (OUTPATIENT)
Dept: FAMILY MEDICINE | Facility: CLINIC | Age: 48
End: 2018-05-17
Payer: MEDICARE

## 2018-05-17 VITALS
HEART RATE: 86 BPM | HEIGHT: 62 IN | BODY MASS INDEX: 25.58 KG/M2 | DIASTOLIC BLOOD PRESSURE: 66 MMHG | SYSTOLIC BLOOD PRESSURE: 116 MMHG | TEMPERATURE: 98.7 F | RESPIRATION RATE: 20 BRPM | WEIGHT: 139 LBS

## 2018-05-17 DIAGNOSIS — G44.219 EPISODIC TENSION-TYPE HEADACHE, NOT INTRACTABLE: Primary | ICD-10-CM

## 2018-05-17 DIAGNOSIS — F31.9 BIPOLAR AFFECTIVE DISORDER, REMISSION STATUS UNSPECIFIED (H): Chronic | ICD-10-CM

## 2018-05-17 DIAGNOSIS — Z79.899 OTHER LONG TERM (CURRENT) DRUG THERAPY: ICD-10-CM

## 2018-05-17 DIAGNOSIS — F20.9 SCHIZOPHRENIA, UNSPECIFIED TYPE (H): Chronic | ICD-10-CM

## 2018-05-17 DIAGNOSIS — F17.219 CIGARETTE NICOTINE DEPENDENCE WITH NICOTINE-INDUCED DISORDER: ICD-10-CM

## 2018-05-17 PROCEDURE — 99213 OFFICE O/P EST LOW 20 MIN: CPT | Performed by: NURSE PRACTITIONER

## 2018-05-17 PROCEDURE — 36415 COLL VENOUS BLD VENIPUNCTURE: CPT | Performed by: NURSE PRACTITIONER

## 2018-05-17 PROCEDURE — 82306 VITAMIN D 25 HYDROXY: CPT | Performed by: NURSE PRACTITIONER

## 2018-05-17 RX ORDER — ACETAMINOPHEN 500 MG
1000 TABLET ORAL EVERY 8 HOURS PRN
Qty: 100 TABLET | Refills: 0 | Status: SHIPPED | OUTPATIENT
Start: 2018-05-17 | End: 2018-06-20

## 2018-05-17 RX ORDER — ESCITALOPRAM OXALATE 20 MG/1
20 TABLET ORAL DAILY
COMMUNITY
End: 2018-07-24

## 2018-05-17 NOTE — LETTER
May 18, 2018      Doreen Gramajo  [address] [facility] [address]        Dear ,    We are writing to inform you of your test results.    Normal vitamin d level.     Resulted Orders   Vitamin D Deficiency   Result Value Ref Range    Vitamin D Deficiency screening 32 20 - 75 ug/L      Comment:      Season, race, dietary intake, and treatment affect the concentration of   25-hydroxy-Vitamin D. Values may decrease during winter months and increase   during summer months. Values 20-29 ug/L may indicate Vitamin D insufficiency   and values <20 ug/L may indicate Vitamin D deficiency.  Vitamin D determination is routinely performed by an immunoassay specific for   25 hydroxyvitamin D3.  If an individual is on vitamin D2 (ergocalciferol)   supplementation, please specify 25 OH vitamin D2 and D3 level determination by   LCMSMS test [id number].         If you have any questions or concerns, please call the clinic at the number listed above.       Sincerely,        Josey Bonilla, CNP

## 2018-05-17 NOTE — MR AVS SNAPSHOT
"              After Visit Summary   5/17/2018    Doreen Gramajo    MRN: [id number]           Patient Information     Date Of Birth          1970        Visit Information        Provider Department      5/17/2018 4:00 PM Josey Bonilla, [facility]        Today's Diagnoses     Episodic tension-type headache, not intractable    -  1    Bipolar affective disorder, remission status unspecified (H)        Schizophrenia, unspecified type (H)        Other long term (current) drug therapy         Cigarette nicotine dependence with nicotine-induced disorder          Care Instructions    1. Episodic tension-type headache, not intractable  Acute  - escitalopram (LEXAPRO) 20 MG tablet; Take 20 mg by mouth daily  - Vitamin D Deficiency  - Increase fitness to 30 minutes daily of whatever (bike, walk, DVD)  - Keep a headache journal  - Monitor your foods- follow list on handout  - Take Tylenol 500 mg x 2 for headaches, every 8 hours as needed  - Follow-up with me in 30 days (bring journal)  - Drink 6-8 glasses of water daily (try to drink most all fluids before 5:00pm)    2. Bipolar affective disorder, remission status unspecified (H)  Chronic, stable  - escitalopram (LEXAPRO) 20 MG tablet; Take 20 mg by mouth daily  - Vitamin D Deficiency    3. Schizophrenia, unspecified type (H)  Chronic, stablea  - escitalopram (LEXAPRO) 20 MG tablet; Take 20 mg by mouth daily  - Vitamin D Deficiency    4. Other long term (current) drug therapy   Chronic, stable  - Vitamin D Deficiency      - Get your eyes checked and fax me results      * Tension Headache    Muscle Tension Headache (also called \"stress headache\") is a very common cause of head pain. Under stress, some people tense the muscles of their shoulder, neck and scalp without knowing it. If this lasts long enough, a headache can occur. These headaches can be very painful and last for hours or even days.  Home Care:    If you were given pain medicine for " this headache, do not drive yourself home. Arrange for a ride, instead. When you get home, try to sleep. You should feel much better when you wake up.    Heat to the back of your neck may relieve neck spasm.    Drink only clear liquids or eat a very light diet to avoid nausea/vomiting until symptoms improve.  Preventing Future Headaches    Identify the sources of stress in your life. These may not be obvious! Learn new ways to handle your stress, such as regular exercise, biofeedback, self-hypnosis and meditation. For more information about this, consult your doctor or go to a local bookstore and review the many books and tapes on this subject.    At the first sign of a tension headache, take time out if possible. Remove yourself from the stressful situation, find a quiet comfortable place to sit or lie down and let yourself relax. Heat and deep massage of the tight areas in the neck and shoulders may help reduce muscle spasm. Medicine, such as ibuprofen (Advil or Motrin) or a prescribed muscle relaxant may be helpful at this point.  Follow Up with your doctor if the headache is not better within the next 24 hours. If you have frequent headaches you should discuss a treatment plan with your primary care doctor. Ask if you can have medicine to take at home the next time you get a bad headache. This may avoid the need for a visit to the emergency department in the future. Poorly controlled chronic headaches may require a referral to a neurologist (headache specialist).  Call your Doctor Or Get Prompt Medical Attention if any of the following occur:    Worsening of your head pain or no improvement within 24 hours    Repeated vomiting (unable to keep liquids down)    Fever of 100.4 F (38.0 C) or higher, or as directed by your healthcare provider    Stiff neck    Extreme drowsiness, confusion or fainting    Dizziness, vertigo (dizziness with spinning sensation)    Weakness of an arm or leg or one side of the  face    Difficulty with speech or vision    1773-4430 [employer] [address] [place] [address], [place] [address]. All rights reserved. This information is not intended as a substitute for professional medical care. Always follow your healthcare professional's instructions.  This information has been modified by your health care provider with permission from the publisher.        Preventing Tension Headaches  Lifestyle changes are the key to preventing tension-type headaches. Learn what changes in your environment and daily activities can prevent the strain and tension that lead to headaches. If emotional stress is a factor, stress reduction may bring relief. Other lifestyle changes can help keep you healthier and better able to cope with pain.  What may cause your headaches  Causes of tension-type headaches include:    Posture and movement. Your posture while you sit, work, drive, and even sleep can put stress on your shoulders and neck. This can tighten muscles in the back of your head, causing headaches.    Lifting and carrying. These can cause strain on your back and neck, especially if you carry too much weight, carry weight unevenly, or use poor lifting technique.    Certain sports. Activities that involve jumping, running, and sudden starts, stops, or changes of direction can jar your neck and head. This may lead to tight muscles and pain. Weightlifting and other activities that require upper body strength can lead to tight neck and shoulder muscles.    Jaw tension. Clenching your jaw or grinding your teeth can result in tension and pain. This may happen while you sleep without your knowing it.    Eye problems. Eyestrain can cause tension in the muscles around the eyes. Or a problem with your eyeglass prescription can make you hold your head at an awkward angle. This can cause neck strain and headaches.    Emotional stress. Many factors lead to emotional stress: overwork, family problems, financial  difficulties, or sudden life changes. This may cause muscle tension.  What you can do  Once you know what s causing your headaches, you can work to prevent them. You may need to seek professional help to make some of the needed changes.    Posture and movement changes. Change the setup of your workspace and car. Learn and maintain good posture. Avoid positions that strain your neck or shoulders. Make sure your bedding and pillows provide good support. Avoid sleeping on couches, chairs, or floors when a bed is available.     Lifting and carrying. Learn good lifting technique. Make sure to use the proper tools and equipment for lifting.    Change your sport. Switch to a low-impact sport. To help relieve stress on your neck and head, cut back on activities that depend on upper body strength or that put a lot of twisting motion on the back, such as golf.     Dental work. See your dentist if you think your headaches are caused by jaw tension or teeth grinding.    New eyewear. Buy an extra pair of glasses adjusted for reading or working at a computer. This helps to reduce eyestrain and keep your neck at a comfortable angle.    Stress management. Learn techniques for relaxing and reducing emotional stress, like deep breathing, visualization, progressive relaxation, and biofeedback. Regular sleep habits can also help to control stress.    Regular sleep and meals. It is important to have a regular sleep cycle and to avoid skipping meals.  Exercise can help  Exercise can improve overall health and help you to relax.    Neck exercises help keep your neck muscles relaxed during the day. Try the neck exercises shown on this sheet. Start in a neutral (relaxed, centered) position. Do 3 repetitions every 2 to 4 hours throughout the day.    Low-impact aerobic exercise helps keep your muscles strong and flexible. This helps prevent tension and the pain it causes.    Stretching, davida chi, and yoga help keep your muscles flexible. They  may also help relieve emotional stress.    Lower your left ear toward your left shoulder. Return to neutral. Repeat on the right side.   Lower your chin to your chest and slowly return to neutral.     Look to the left. Return to neutral. Then look to the right.     Move your head forward and back while keeping the top of your head level.   Date Last Reviewed: 11/8/2015 2000-2017 [employer]. [address] [place] [address]. All rights reserved. This information is not intended as a substitute for professional medical care. Always follow your healthcare professional's instructions.                Follow-ups after your visit        Your next 10 appointments already scheduled     May 22, 2018  9:15 AM CDT   [facility]    [address] [facility] [id number] [phone]           Please do not eat 10-12 hours before your appointment if you are coming in fasting for labs on lipids, cholesterol, or glucose (sugar). This does not apply to pregnant women. Water, hot tea and black coffee (with nothing added) are okay. Do not drink other fluids, diet soda or chew gum.            May 22, 2018  9:30 AM CDT   Nurse Only with [place] PI CMA[facility]LPN   [facility]    [address] [facility] [id number]   [phone]            Jun 12, 2018  9:30 AM CDT   Nurse Only with [place] PI CMA/LPN   [facility]    [address] [facility] [id number] [phone]            Jun 19, 2018  9:15 AM CDT   LAB with PI LAB   [facility]    [address] [facility] [id number] [phone]           Please do not eat 10-12 hours before your appointment if you are coming in fasting for labs on lipids, cholesterol, or glucose (sugar). This does not apply to pregnant women. Water, hot tea and black  "coffee (with nothing added) are okay. Do not drink other fluids, diet soda or chew gum.              Who to contact     If you have questions or need follow up information about today's clinic visit or your schedule please contact [facility] directly at [phone].  Normal or non-critical lab and imaging results will be communicated to you by MyChart, letter or phone within 4 business days after the clinic has received the results. If you do not hear from us within 7 days, please contact the clinic through [employer] or phone. If you have a critical or abnormal lab result, we will notify you by phone as soon as possible.  Submit refill requests through [employer] or call your pharmacy and they will forward the refill request to us. Please allow 3 business days for your refill to be completed.          Additional Information About Your Visit        MyChart Information     [employer] lets you send messages to your doctor, view your test results, renew your prescriptions, schedule appointments and more. To sign up, go to [email or web address] . Click on \"Log in\" on the left side of the screen, which will take you to the Welcome page. Then click on \"Sign up Now\" on the right side of the page.     You will be asked to enter the access code listed below, as well as some personal information. Please follow the directions to create your username and password.     Your access code is: [email or web address]  Expires: 2018 11:46 AM     Your access code will  in 90 days. If you need help or a new code, please call your [facility] or [phone].        Care EveryWhere ID     This is your Care EveryWhere ID. This could be used by other organizations to access your [place] medical records  [id number]        Your Vitals Were     Pulse Temperature Respirations Height BMI (Body Mass Index)       86 98.7  F (37.1  C) (Tympanic) 20 5' 2\" (1.575 m) 25.42 kg/m2        Blood Pressure from Last 3 Encounters: "   05/17/18 116/66   11/14/17 104/68   07/18/17 124/68    Weight from Last 3 Encounters:   05/17/18 139 lb (63 kg)   11/14/17 139 lb (63 kg)   07/18/17 154 lb (69.9 kg)              We Performed the Following     Vitamin D Deficiency          Today's Medication Changes          These changes are accurate as of 5/17/18  4:59 PM.  If you have any questions, ask your nurse or doctor.               Start taking these medicines.        Dose/Directions    acetaminophen 500 MG tablet   Commonly known as:  TYLENOL   Used for:  Episodic tension-type headache, not intractable   Started by:  Josey Bonilla CNP        Dose:  1000 mg   Take 2 tablets (1,000 mg) by mouth every 8 hours as needed For headache   Quantity:  100 tablet   Refills:  0       nicotine polacrilex 2 MG gum   Commonly known as:  NICORETTE STARTER KIT   Used for:  Cigarette nicotine dependence with nicotine-induced disorder   Started by:  Josey Bonilla CNP        Dose:  2 mg   Place 1 each (2 mg) inside cheek as needed for smoking cessation As needed every 1-2 hours   Quantity:  100 tablet   Refills:  0            Where to get your medicines      These medications were sent to [facility] [address]     Phone:  [phone]     acetaminophen 500 MG tablet    nicotine polacrilex 2 MG gum                Primary Care Provider Office Phone # Fax #    Josey Bonilla [phone]       [address] [id number]        Equal Access to Services     [facility] AH: Hadii aad ku hadasho Sobethany, waaxda luqadaha, qaybta kaalmada adeegyada, alise giles. So [facility] [phone].    ATENCIÓN: Si habla español, tiene a mariano disposición servicios gratuitos de asistencia lingüística. Llame al [phone].    We comply with applicable federal civil rights laws and [place] laws. We do not discriminate  on the basis of race, color, national origin, age, disability, sex, sexual orientation, or gender identity.            Thank you!     Thank you for choosing [facility]  for your care. Our goal is always to provide you with excellent care. Hearing back from our patients is one way we can continue to improve our services. Please take a few minutes to complete the written survey that you may receive in the mail after your visit with us. Thank you!             Your Updated Medication List - Protect others around you: Learn how to safely use, store and throw away your medicines at www.disposemymeds.org.          This list is accurate as of 5/17/18  4:59 PM.  Always use your most recent med list.                   Brand Name Dispense Instructions for use Diagnosis    acetaminophen 500 MG tablet    TYLENOL    100 tablet    Take 2 tablets (1,000 mg) by mouth every 8 hours as needed For headache    Episodic tension-type headache, not intractable       atropine 0.1 mg/mL Susp suspension      Take by mouth At Bedtime        B Complex-C Tabs     30 tablet    Take 1 tablet by mouth daily    Iron deficiency       CERTAVITE/ANTIOXIDANTS Tabs tablet   Generic drug:  multivitamin, therapeutic with minerals     30 tablet    TAKE ONE TABLET BY MOUTH EVERY DAY    Schizoaffective disorder, unspecified type (H)       Clozapine 200 MG tablet    CLOZARIL     150 mg at breakfast and 650 mg after dinner        DEPEND UNDERGARMENTS Misc     31 each    1 each daily as needed MEDIUM sized briefs    Urinary incontinence, unspecified type       DIPHENDRYL PO      Take 50 mg by mouth 3 times daily as needed        Ferrous Sulfate 324 (65 Fe) MG Tbec     60 tablet    TAKE ONE TABLET BY MOUTH TWICE DAILY    Iron deficiency       haloperidol decanoate 100 MG/ML injection    HALDOL DECANOATE    1 mL    Inject 100 mg into the muscle every 21 days Order scanned into [employer], Order from Jody Schoenecker ([facility])  ph:[phone] Received on 12/05/2017. Refills 12.    Schizophrenia, unspecified type (H), Bipolar affective disorder, remission status unspecified (H)       * HALOPERIDOL PO      Take 5 mg by mouth every 3 hours as needed for agitation (up to three daily)        * HALOPERIDOL PO      Take 10 mg by mouth At Bedtime        levothyroxine 50 MCG tablet    SYNTHROID/LEVOTHROID    30 tablet    Take 1 tablet (50 mcg) by mouth daily    Hypothyroidism, unspecified type       LEXAPRO 20 MG tablet   Generic drug:  escitalopram      Take 20 mg by mouth daily    Episodic tension-type headache, not intractable, Bipolar affective disorder, remission status unspecified (H), Schizophrenia, unspecified type (H)       lithium 300 MG tablet      Take 300 mg by mouth 2 times daily        LORAZEPAM PO      Take 1 mg by mouth every 3 hours as needed for anxiety        nicotine polacrilex 2 MG gum    NICORETTE STARTER KIT    100 tablet    Place 1 each (2 mg) inside cheek as needed for smoking cessation As needed every 1-2 hours    Cigarette nicotine dependence with nicotine-induced disorder       * order for DME     1 each    INCONTINENT PRODUCTS (UP / MONTH)    Chronic constipation, Schizophrenia, unspecified type (H), Schizoaffective disorder, unspecified type (H), Disturbance of conduct, Extrapyramidal symptom, Urinary incontinence, unspecified type, Bipolar affective disorder, remission status unspecified (H)       * order for DME     1 each    GLOVES    Chronic constipation, Schizophrenia, unspecified type (H), Schizoaffective disorder, unspecified type (H), Disturbance of conduct, Extrapyramidal symptom, Urinary incontinence, unspecified type, Bipolar affective disorder, remission status unspecified (H)       * order for DME     1 each    INCONTINENT PERSONAL WIPES    Chronic constipation, Schizophrenia, unspecified type (H), Schizoaffective disorder, unspecified type (H), Disturbance of conduct, Extrapyramidal symptom,  Urinary incontinence, unspecified type, Bipolar affective disorder, remission status unspecified (H)       * order for DME     1 each    CHUX PADS    Chronic constipation, Schizophrenia, unspecified type (H), Schizoaffective disorder, unspecified type (H), Disturbance of conduct, Extrapyramidal symptom, Urinary incontinence, unspecified type, Bipolar affective disorder, remission status unspecified (H)       pantoprazole 20 MG EC tablet    PROTONIX    30 tablet    TAKE ONE TABLET BY MOUTH EVERY DAY    Gastroesophageal reflux disease without esophagitis       polyethylene glycol powder    MIRALAX    510 g    Take 17 g (1 capful) by mouth daily    Chronic constipation       sennosides 8.6 MG tablet    SENOKOT    200 tablet    TAKE THREE TABLETS BY MOUTH TWICE DAILY    Chronic constipation       simethicone 80 MG chewable tablet    MYLICON    180 tablet    Take 1 tablet (80 mg) by mouth every 6 hours as needed for flatulence or cramping PRN    Flatulence, eructation and gas pain       TRAZODONE HCL PO      Take 200 mg by mouth At Bedtime        VENTOLIN  (90 Base) MCG/ACT Inhaler   Generic drug:  albuterol     18 Inhaler    Inhale 2 puffs into the lungs every 6 hours (PRN for shortness of breath)    Asthma, intermittent, uncomplicated       * Notice:  This list has 6 medication(s) that are the same as other medications prescribed for you. Read the directions carefully, and ask your doctor or other care provider to review them with you.

## 2018-05-17 NOTE — NURSING NOTE
"Chief Complaint   Patient presents with     Headache       Initial /66 (BP Location: Right arm, Patient Position: Sitting, Cuff Size: Adult Regular)  Pulse 86  Temp 98.7  F (37.1  C) (Tympanic)  Resp 20  Ht 5' 2\" (1.575 m)  Wt 139 lb (63 kg)  BMI 25.42 kg/m2 Estimated body mass index is 25.42 kg/(m^2) as calculated from the following:    Height as of this encounter: 5' 2\" (1.575 m).    Weight as of this encounter: 139 lb (63 kg).      Health Maintenance that is potentially due pending provider review:  ACT    n/a    Is there anyone who you would like to be able to receive your results? No  If yes have patient fill out WOODY    "

## 2018-05-17 NOTE — PROGRESS NOTES
SUBJECTIVE:   Doreen Gramajo is a 47 year old female who presents to clinic today for the following health issues:      Headaches      Duration: 4 months     Description  Location: bilateral in the frontal area   Character: throbbing pain  Frequency:  2-3 weekly   Duration:  6-12 hours     Intensity:  6-7/10    Accompanying signs and symptoms: Irregular menses and mood wings     Precipitating or Alleviating factors:  Nausea/vomiting: no  Dizziness: no  Weakness or numbness: no  Visual changes: none  Fever: no   Sinus or URI symptoms no     History  Head trauma: YES- 15 years ago   Family history of migraines: no  Previous tests for headaches: no  Neurologist evaluations: no  Able to do daily activities when headache present: YES- but it is very difficult   Wake with headaches: YES  Daily pain medication use: no   Any changes in: none    Precipitating or Alleviating factors (light/sound/sleep/caffeine): no    Therapies tried and outcome: Tylenol and cold packs  Outcome - usually effective  Frequent/daily pain medication use: no, only as needed    Irregular periods 2X month since Feb 2018 April 2018- eye exam, normal  Tylenol 2 X 325 mg helping better than Ibuprofen (sometimes 6 hours, sometimes lasting all day)  April/May- saw July, PMS mood swings lasting a little longer  Sisters going through menopause at 48 yo  No family history of migraine  Sleeping well, sometimes up to pee, otherwise [id number] wake up daily  Smoking still- 1 pack for 2-3 days, she'd like Nicorette gum  Exercise: on/off, more active in the summer months with walking, FIT BIT   Stationary bike, Physical Therapy stretch bands, walk    The 10-year ASCVD risk score (Cristi PELON Jr, et al., 2013) is: 1.8%    Values used to calculate the score:      Age: 47 years      Sex: Female      Is Non- : No      Diabetic: No      Tobacco smoker: Yes      Systolic Blood Pressure: 116 mmHg      Is BP treated: No      HDL Cholesterol: 65  mg/dL      Total Cholesterol: 190 mg/dL    HPI:   PCP:  Josey Bonilla, [phone]    Patient Active Problem List   Diagnosis     Schizophrenia (H)     Hypothyroidism     Bipolar affective (H)     Asthma, intermittent     Gastroesophageal reflux disease without esophagitis     CARDIOVASCULAR SCREENING; LDL GOAL LESS THAN 160     Health Care Home     Psychosis     Behavior disturbance     Cigarette nicotine dependence with nicotine-induced disorder     Iron deficiency     Chronic constipation     Urinary incontinence, unspecified type     Borderline intellectual functioning     History of anorexia nervosa     Rectal prolapse     Extrapyramidal symptom     History of eating disorder     Disturbance of conduct     Paranoid schizophrenia, subchronic condition with acute exacerbation (H)     Current Outpatient Prescriptions   Medication     acetaminophen (TYLENOL) 500 MG tablet     B Complex-C TABS     CERTAVITE/ANTIOXIDANTS TABS tablet     cloZAPine (CLOZARIL) 200 MG tablet     DiphenhydrAMINE HCl (DIPHENDRYL PO)     escitalopram (LEXAPRO) 20 MG tablet     Ferrous Sulfate 324 (65 FE) MG TBEC     haloperidol decanoate (HALDOL DECANOATE) 100 MG/ML injection     HALOPERIDOL PO     HALOPERIDOL PO     levothyroxine (SYNTHROID/LEVOTHROID) 50 MCG tablet     lithium 300 MG tablet     LORAZEPAM PO     nicotine polacrilex (NICORETTE STARTER KIT) 2 MG gum     pantoprazole (PROTONIX) 20 MG EC tablet     polyethylene glycol (MIRALAX) powder     sennosides (SENOKOT) 8.6 MG tablet     simethicone (MYLICON) 80 MG chewable tablet     TRAZODONE HCL PO     VENTOLIN  (90 Base) MCG/ACT Inhaler     atropine 0.1 mg/mL     Incontinence Supply Disposable (DEPEND UNDERGARMENTS) MISC     order for DME     order for DME     order for DME     order for DME     [DISCONTINUED] Escitalopram Oxalate (LEXAPRO PO)     No current facility-administered medications for this visit.        Health Maintenance Due   Topic Date Due     ASTHMA  "CONTROL TEST Q6 MOS  01/18/2018       Reviewed and updated:  Tobacco  Allergies  Meds  Med Hx  Surg Hx  Fam Hx  Soc Hx     ROS:  Constitutional, HEENT, cardiovascular, pulmonary, gi and gu systems are negative, except as otherwise noted.    PHYSICAL EXAM:   /66 (BP Location: Right arm, Patient Position: Sitting, Cuff Size: Adult Regular)  Pulse 86  Temp 98.7  F (37.1  C) (Tympanic)  Resp 20  Ht 5' 2\" (1.575 m)  Wt 139 lb (63 kg)  BMI 25.42 kg/m2  Body mass index is 25.42 kg/(m^2).  GENERAL APPEARANCE: healthy, alert and no distress  EYES: Eyes grossly normal to inspection, PERRL and conjunctivae and sclerae normal  HENT: ear canals and TM's normal and nose and mouth without ulcers or lesions  NECK: no adenopathy, no asymmetry, masses, or scars and thyroid normal to palpation  RESP: lungs clear to auscultation - no rales, rhonchi or wheezes  MS: extremities normal- no gross deformities noted  SKIN: no suspicious lesions or rashes  PSYCH: mentation appears normal and affect normal/bright    ASSESSMENT & PLAN:   1. Episodic tension-type headache, not intractable  Acute  - escitalopram (LEXAPRO) 20 MG tablet; Take 20 mg by mouth daily  - Vitamin D Deficiency  - Increase fitness to 30 minutes daily of whatever (bike, walk, DVD)  - Keep a headache journal  - Monitor your foods- follow list on handout  - Take Tylenol 500 mg x 2 for headaches, every 8 hours as needed  - Follow-up with me in 30 days (bring journal)  - Drink 6-8 glasses of water daily (try to drink most all fluids before 5:00pm)    2. Bipolar affective disorder, remission status unspecified (H)  Chronic, stable  - escitalopram (LEXAPRO) 20 MG tablet; Take 20 mg by mouth daily  - Vitamin D Deficiency    3. Schizophrenia, unspecified type (H)  Chronic, stablea  - escitalopram (LEXAPRO) 20 MG tablet; Take 20 mg by mouth daily  - Vitamin D Deficiency    4. Other long term (current) drug therapy   Chronic, stable  - Vitamin D Deficiency      - " "Get your eyes checked and fax me results      * Tension Headache    Muscle Tension Headache (also called \"stress headache\") is a very common cause of head pain. Under stress, some people tense the muscles of their shoulder, neck and scalp without knowing it. If this lasts long enough, a headache can occur. These headaches can be very painful and last for hours or even days.  Home Care:    If you were given pain medicine for this headache, do not drive yourself home. Arrange for a ride, instead. When you get home, try to sleep. You should feel much better when you wake up.    Heat to the back of your neck may relieve neck spasm.    Drink only clear liquids or eat a very light diet to avoid nausea/vomiting until symptoms improve.  Preventing Future Headaches    Identify the sources of stress in your life. These may not be obvious! Learn new ways to handle your stress, such as regular exercise, biofeedback, self-hypnosis and meditation. For more information about this, consult your doctor or go to a local bookstore and review the many books and tapes on this subject.    At the first sign of a tension headache, take time out if possible. Remove yourself from the stressful situation, find a quiet comfortable place to sit or lie down and let yourself relax. Heat and deep massage of the tight areas in the neck and shoulders may help reduce muscle spasm. Medicine, such as ibuprofen (Advil or Motrin) or a prescribed muscle relaxant may be helpful at this point.  Follow Up with your doctor if the headache is not better within the next 24 hours. If you have frequent headaches you should discuss a treatment plan with your primary care doctor. Ask if you can have medicine to take at home the next time you get a bad headache. This may avoid the need for a visit to the emergency department in the future. Poorly controlled chronic headaches may require a referral to a neurologist (headache specialist).  Call your Doctor Or Get " Prompt Medical Attention if any of the following occur:    Worsening of your head pain or no improvement within 24 hours    Repeated vomiting (unable to keep liquids down)    Fever of 100.4 F (38.0 C) or higher, or as directed by your healthcare provider    Stiff neck    Extreme drowsiness, confusion or fainting    Dizziness, vertigo (dizziness with spinning sensation)    Weakness of an arm or leg or one side of the face    Difficulty with speech or vision    9633-9364 [employer]. [address] [place] [address]. All rights reserved. This information is not intended as a substitute for professional medical care. Always follow your healthcare professional's instructions.  This information has been modified by your health care provider with permission from the publisher.        Preventing Tension Headaches  Lifestyle changes are the key to preventing tension-type headaches. Learn what changes in your environment and daily activities can prevent the strain and tension that lead to headaches. If emotional stress is a factor, stress reduction may bring relief. Other lifestyle changes can help keep you healthier and better able to cope with pain.  What may cause your headaches  Causes of tension-type headaches include:    Posture and movement. Your posture while you sit, work, drive, and even sleep can put stress on your shoulders and neck. This can tighten muscles in the back of your head, causing headaches.    Lifting and carrying. These can cause strain on your back and neck, especially if you carry too much weight, carry weight unevenly, or use poor lifting technique.    Certain sports. Activities that involve jumping, running, and sudden starts, stops, or changes of direction can jar your neck and head. This may lead to tight muscles and pain. Weightlifting and other activities that require upper body strength can lead to tight neck and shoulder muscles.    Jaw tension. Clenching your jaw or  grinding your teeth can result in tension and pain. This may happen while you sleep without your knowing it.    Eye problems. Eyestrain can cause tension in the muscles around the eyes. Or a problem with your eyeglass prescription can make you hold your head at an awkward angle. This can cause neck strain and headaches.    Emotional stress. Many factors lead to emotional stress: overwork, family problems, financial difficulties, or sudden life changes. This may cause muscle tension.  What you can do  Once you know what s causing your headaches, you can work to prevent them. You may need to seek professional help to make some of the needed changes.    Posture and movement changes. Change the setup of your workspace and car. Learn and maintain good posture. Avoid positions that strain your neck or shoulders. Make sure your bedding and pillows provide good support. Avoid sleeping on couches, chairs, or floors when a bed is available.     Lifting and carrying. Learn good lifting technique. Make sure to use the proper tools and equipment for lifting.    Change your sport. Switch to a low-impact sport. To help relieve stress on your neck and head, cut back on activities that depend on upper body strength or that put a lot of twisting motion on the back, such as golf.     Dental work. See your dentist if you think your headaches are caused by jaw tension or teeth grinding.    New eyewear. Buy an extra pair of glasses adjusted for reading or working at a computer. This helps to reduce eyestrain and keep your neck at a comfortable angle.    Stress management. Learn techniques for relaxing and reducing emotional stress, like deep breathing, visualization, progressive relaxation, and biofeedback. Regular sleep habits can also help to control stress.    Regular sleep and meals. It is important to have a regular sleep cycle and to avoid skipping meals.  Exercise can help  Exercise can improve overall health and help you to  relax.    Neck exercises help keep your neck muscles relaxed during the day. Try the neck exercises shown on this sheet. Start in a neutral (relaxed, centered) position. Do 3 repetitions every 2 to 4 hours throughout the day.    Low-impact aerobic exercise helps keep your muscles strong and flexible. This helps prevent tension and the pain it causes.    Stretching, davida chi, and yoga help keep your muscles flexible. They may also help relieve emotional stress.    Lower your left ear toward your left shoulder. Return to neutral. Repeat on the right side.   Lower your chin to your chest and slowly return to neutral.     Look to the left. Return to neutral. Then look to the right.     Move your head forward and back while keeping the top of your head level.   Date Last Reviewed: 11/8/2015 2000-2017 The "[employer]. [address] [place] [address], [place] [address]. All rights reserved. This information is not intended as a substitute for professional medical care. Always follow your healthcare professional's instructions.          Risks, benefits, side effects and rationale for treatment plan fully discussed with the patient and understanding expressed.    RENÉ Bhagat-BC  [facility]

## 2018-05-17 NOTE — PATIENT INSTRUCTIONS
"1. Episodic tension-type headache, not intractable  Acute  - escitalopram (LEXAPRO) 20 MG tablet; Take 20 mg by mouth daily  - Vitamin D Deficiency  - Increase fitness to 30 minutes daily of whatever (bike, walk, DVD)  - Keep a headache journal  - Monitor your foods- follow list on handout  - Take Tylenol 500 mg x 2 for headaches, every 8 hours as needed  - Follow-up with me in 30 days (bring journal)  - Drink 6-8 glasses of water daily (try to drink most all fluids before 5:00pm)    2. Bipolar affective disorder, remission status unspecified (H)  Chronic, stable  - escitalopram (LEXAPRO) 20 MG tablet; Take 20 mg by mouth daily  - Vitamin D Deficiency    3. Schizophrenia, unspecified type (H)  Chronic, stablea  - escitalopram (LEXAPRO) 20 MG tablet; Take 20 mg by mouth daily  - Vitamin D Deficiency    4. Other long term (current) drug therapy   Chronic, stable  - Vitamin D Deficiency      - Get your eyes checked and fax me results      * Tension Headache    Muscle Tension Headache (also called \"stress headache\") is a very common cause of head pain. Under stress, some people tense the muscles of their shoulder, neck and scalp without knowing it. If this lasts long enough, a headache can occur. These headaches can be very painful and last for hours or even days.  Home Care:    If you were given pain medicine for this headache, do not drive yourself home. Arrange for a ride, instead. When you get home, try to sleep. You should feel much better when you wake up.    Heat to the back of your neck may relieve neck spasm.    Drink only clear liquids or eat a very light diet to avoid nausea/vomiting until symptoms improve.  Preventing Future Headaches    Identify the sources of stress in your life. These may not be obvious! Learn new ways to handle your stress, such as regular exercise, biofeedback, self-hypnosis and meditation. For more information about this, consult your doctor or go to a local bookstore and review the " many books and tapes on this subject.    At the first sign of a tension headache, take time out if possible. Remove yourself from the stressful situation, find a quiet comfortable place to sit or lie down and let yourself relax. Heat and deep massage of the tight areas in the neck and shoulders may help reduce muscle spasm. Medicine, such as ibuprofen (Advil or Motrin) or a prescribed muscle relaxant may be helpful at this point.  Follow Up with your doctor if the headache is not better within the next 24 hours. If you have frequent headaches you should discuss a treatment plan with your primary care doctor. Ask if you can have medicine to take at home the next time you get a bad headache. This may avoid the need for a visit to the emergency department in the future. Poorly controlled chronic headaches may require a referral to a neurologist (headache specialist).  Call your Doctor Or Get Prompt Medical Attention if any of the following occur:    Worsening of your head pain or no improvement within 24 hours    Repeated vomiting (unable to keep liquids down)    Fever of 100.4 F (38.0 C) or higher, or as directed by your healthcare provider    Stiff neck    Extreme drowsiness, confusion or fainting    Dizziness, vertigo (dizziness with spinning sensation)    Weakness of an arm or leg or one side of the face    Difficulty with speech or vision    7594-7006 [employer]. [address] [place] [address], [place] [address]. All rights reserved. This information is not intended as a substitute for professional medical care. Always follow your healthcare professional's instructions.  This information has been modified by your health care provider with permission from the publisher.        Preventing Tension Headaches  Lifestyle changes are the key to preventing tension-type headaches. Learn what changes in your environment and daily activities can prevent the strain and tension that lead to headaches. If emotional  stress is a factor, stress reduction may bring relief. Other lifestyle changes can help keep you healthier and better able to cope with pain.  What may cause your headaches  Causes of tension-type headaches include:    Posture and movement. Your posture while you sit, work, drive, and even sleep can put stress on your shoulders and neck. This can tighten muscles in the back of your head, causing headaches.    Lifting and carrying. These can cause strain on your back and neck, especially if you carry too much weight, carry weight unevenly, or use poor lifting technique.    Certain sports. Activities that involve jumping, running, and sudden starts, stops, or changes of direction can jar your neck and head. This may lead to tight muscles and pain. Weightlifting and other activities that require upper body strength can lead to tight neck and shoulder muscles.    Jaw tension. Clenching your jaw or grinding your teeth can result in tension and pain. This may happen while you sleep without your knowing it.    Eye problems. Eyestrain can cause tension in the muscles around the eyes. Or a problem with your eyeglass prescription can make you hold your head at an awkward angle. This can cause neck strain and headaches.    Emotional stress. Many factors lead to emotional stress: overwork, family problems, financial difficulties, or sudden life changes. This may cause muscle tension.  What you can do  Once you know what s causing your headaches, you can work to prevent them. You may need to seek professional help to make some of the needed changes.    Posture and movement changes. Change the setup of your workspace and car. Learn and maintain good posture. Avoid positions that strain your neck or shoulders. Make sure your bedding and pillows provide good support. Avoid sleeping on couches, chairs, or floors when a bed is available.     Lifting and carrying. Learn good lifting technique. Make sure to use the proper tools and  equipment for lifting.    Change your sport. Switch to a low-impact sport. To help relieve stress on your neck and head, cut back on activities that depend on upper body strength or that put a lot of twisting motion on the back, such as golf.     Dental work. See your dentist if you think your headaches are caused by jaw tension or teeth grinding.    New eyewear. Buy an extra pair of glasses adjusted for reading or working at a computer. This helps to reduce eyestrain and keep your neck at a comfortable angle.    Stress management. Learn techniques for relaxing and reducing emotional stress, like deep breathing, visualization, progressive relaxation, and biofeedback. Regular sleep habits can also help to control stress.    Regular sleep and meals. It is important to have a regular sleep cycle and to avoid skipping meals.  Exercise can help  Exercise can improve overall health and help you to relax.    Neck exercises help keep your neck muscles relaxed during the day. Try the neck exercises shown on this sheet. Start in a neutral (relaxed, centered) position. Do 3 repetitions every 2 to 4 hours throughout the day.    Low-impact aerobic exercise helps keep your muscles strong and flexible. This helps prevent tension and the pain it causes.    Stretching, davida chi, and yoga help keep your muscles flexible. They may also help relieve emotional stress.    Lower your left ear toward your left shoulder. Return to neutral. Repeat on the right side.   Lower your chin to your chest and slowly return to neutral.     Look to the left. Return to neutral. Then look to the right.     Move your head forward and back while keeping the top of your head level.   Date Last Reviewed: 11/8/2015 2000-2017 [employer]. [address] [place] [address], [place] [address]. All rights reserved. This information is not intended as a substitute for professional medical care. Always follow your healthcare professional's  instructions.

## 2018-05-18 ENCOUNTER — MEDICAL CORRESPONDENCE (OUTPATIENT)
Dept: HEALTH INFORMATION MANAGEMENT | Facility: CLINIC | Age: 48
End: 2018-05-18

## 2018-05-18 LAB — DEPRECATED CALCIDIOL+CALCIFEROL SERPL-MC: 32 UG/L (ref 20–75)

## 2018-05-18 NOTE — PROGRESS NOTES
Normal vitamin d level  Please notify her of these results and send a hard copy if not on myChart.   Thanks. RENÉ Nowak

## 2018-05-22 ENCOUNTER — ALLIED HEALTH/NURSE VISIT (OUTPATIENT)
Dept: FAMILY MEDICINE | Facility: CLINIC | Age: 48
End: 2018-05-22
Payer: MEDICARE

## 2018-05-22 DIAGNOSIS — F20.9 SCHIZOPHRENIA, UNSPECIFIED TYPE (H): Chronic | ICD-10-CM

## 2018-05-22 DIAGNOSIS — F31.9 BIPOLAR AFFECTIVE DISORDER, REMISSION STATUS UNSPECIFIED (H): Chronic | ICD-10-CM

## 2018-05-22 DIAGNOSIS — Z79.899 ENCOUNTER FOR LONG-TERM (CURRENT) USE OF MEDICATIONS: ICD-10-CM

## 2018-05-22 DIAGNOSIS — F20.0 PARANOID SCHIZOPHRENIA, SUBCHRONIC CONDITION WITH ACUTE EXACERBATION (H): ICD-10-CM

## 2018-05-22 DIAGNOSIS — Z79.899 NEED FOR PROPHYLACTIC CHEMOTHERAPY: ICD-10-CM

## 2018-05-22 LAB
BASOPHILS # BLD AUTO: 0 10E9/L (ref 0–0.2)
BASOPHILS NFR BLD AUTO: 0.4 %
DIFFERENTIAL METHOD BLD: ABNORMAL
EOSINOPHIL # BLD AUTO: 0 10E9/L (ref 0–0.7)
EOSINOPHIL NFR BLD AUTO: 0.1 %
ERYTHROCYTE [DISTWIDTH] IN BLOOD BY AUTOMATED COUNT: 13.2 % (ref 10–15)
HCT VFR BLD AUTO: 38 % (ref 35–47)
HGB BLD-MCNC: 12.3 G/DL (ref 11.7–15.7)
LYMPHOCYTES # BLD AUTO: 1.9 10E9/L (ref 0.8–5.3)
LYMPHOCYTES NFR BLD AUTO: 24.3 %
MCH RBC QN AUTO: 33.6 PG (ref 26.5–33)
MCHC RBC AUTO-ENTMCNC: 32.4 G/DL (ref 31.5–36.5)
MCV RBC AUTO: 104 FL (ref 78–100)
MONOCYTES # BLD AUTO: 0.6 10E9/L (ref 0–1.3)
MONOCYTES NFR BLD AUTO: 7.6 %
NEUTROPHILS # BLD AUTO: 5.2 10E9/L (ref 1.6–8.3)
NEUTROPHILS NFR BLD AUTO: 67.6 %
PLATELET # BLD AUTO: 339 10E9/L (ref 150–450)
RBC # BLD AUTO: 3.66 10E12/L (ref 3.8–5.2)
WBC # BLD AUTO: 7.7 10E9/L (ref 4–11)

## 2018-05-22 PROCEDURE — 96372 THER/PROPH/DIAG INJ SC/IM: CPT

## 2018-05-22 PROCEDURE — 85025 COMPLETE CBC W/AUTO DIFF WBC: CPT | Performed by: NURSE PRACTITIONER

## 2018-05-22 PROCEDURE — 36415 COLL VENOUS BLD VENIPUNCTURE: CPT | Performed by: NURSE PRACTITIONER

## 2018-05-22 PROCEDURE — 99207 ZZC NO CHARGE NURSE ONLY: CPT

## 2018-05-22 PROCEDURE — 80159 DRUG ASSAY CLOZAPINE: CPT | Mod: 90 | Performed by: NURSE PRACTITIONER

## 2018-05-22 NOTE — MR AVS SNAPSHOT
After Visit Summary   5/22/2018    Doreen Gramajo    MRN: [id number]           Patient Information     Date Of Birth          1970        Visit Information        Provider Department      5/22/2018 9:30 AM FL RALF EDMONDS/LPN [facility]        Today's Diagnoses     Bipolar affective disorder, remission status unspecified (H)        Schizophrenia, unspecified type (H)        Paranoid schizophrenia, subchronic condition with acute exacerbation (H)           Follow-ups after your visit        Your next 10 appointments already scheduled     Jun 12, 2018  9:30 AM CDT   Nurse Only with [place] PI GREY/LPN   [facility]    [address] [address]   [phone]            Jun 19, 2018  9:15 AM CDT   LAB with PI LAB   [facility]    [address] [address]   [phone]           Please do not eat 10-12 hours before your appointment if you are coming in fasting for labs on lipids, cholesterol, or glucose (sugar). This does not apply to pregnant women. Water, hot tea and black coffee (with nothing added) are okay. Do not drink other fluids, diet soda or chew gum.            Jun 20, 2018  4:00 PM CDT   SHORT with Josey Bonilla CNP   [facility]    [address] [place] [address]   [phone]              Who to contact     If you have questions or need follow up information about today's clinic visit or your schedule please contact [facility] directly at [phone].  Normal or non-critical lab and imaging results will be communicated to you by MyChart, letter or phone within 4 business days after the clinic has received the results. If you do not hear from us within 7 days, please contact the clinic through MyChart or phone. If you have a critical or abnormal lab result, we will notify  "you by phone as soon as possible.  Submit refill requests through Elli or call your pharmacy and they will forward the refill request to us. Please allow 3 business days for your refill to be completed.          Additional Information About Your Visit        [employer] Information     Elli lets you send messages to your doctor, view your test results, renew your prescriptions, schedule appointments and more. To sign up, go to www.[facility]/Elli . Click on \"Log in\" on the left side of the screen, which will take you to the Welcome page. Then click on \"Sign up Now\" on the right side of the page.     You will be asked to enter the access code listed below, as well as some personal information. Please follow the directions to create your username and password.     Your access code is: [email or web address]  Expires: 2018 11:46 AM     Your access code will  in 90 days. If you need help or a new code, please call your [facility] clinic or [phone].        Care EveryWhere ID     This is your Care EveryWhere ID. This could be used by other organizations to access your [facility] medical records  [id number]         Blood Pressure from Last 3 Encounters:   18 116/66   17 104/68   17 124/68    Weight from Last 3 Encounters:   18 139 lb (63 kg)   17 139 lb (63 kg)   17 154 lb (69.9 kg)              We Performed the Following     HALOPERIDOL DECANOATE INJ     INJECTION INTRAMUSCULAR OR SUB-Q        Primary Care Provider Office Phone # Fax #    [facility] [phone]       [address] [facility] [address]        Equal Access to Services     [facility] MICHELLE : Hadii maxwell Rodriguez, wakaydenda william, qaybta kaalmada reid, alise giles. So [facility] [phone].    ATENCIÓN: Si habla español, tiene a mariano disposición servicios gratuitos de asistencia lingüística. Llame al [phone].    We comply with applicable " federal civil rights laws and [place] laws. We do not discriminate on the basis of race, color, national origin, age, disability, sex, sexual orientation, or gender identity.            Thank you!     Thank you for choosing [facility]  for your care. Our goal is always to provide you with excellent care. Hearing back from our patients is one way we can continue to improve our services. Please take a few minutes to complete the written survey that you may receive in the mail after your visit with us. Thank you!             Your Updated Medication List - Protect others around you: Learn how to safely use, store and throw away your medicines at www.disposemymeds.org.          This list is accurate as of 5/22/18 11:59 PM.  Always use your most recent med list.                   Brand Name Dispense Instructions for use Diagnosis    acetaminophen 500 MG tablet    TYLENOL    100 tablet    Take 2 tablets (1,000 mg) by mouth every 8 hours as needed For headache    Episodic tension-type headache, not intractable       atropine 0.1 mg/mL Susp suspension      Take by mouth At Bedtime        B Complex-C Tabs     30 tablet    Take 1 tablet by mouth daily    Iron deficiency       CERTAVITE/ANTIOXIDANTS Tabs tablet   Generic drug:  multivitamin, therapeutic with minerals     30 tablet    TAKE ONE TABLET BY MOUTH EVERY DAY    Schizoaffective disorder, unspecified type (H)       Clozapine 200 MG tablet    CLOZARIL     150 mg at breakfast and 650 mg after dinner        DEPEND UNDERGARMENTS Misc     31 each    1 each daily as needed MEDIUM sized briefs    Urinary incontinence, unspecified type       DIPHENDRYL PO      Take 50 mg by mouth 3 times daily as needed        Ferrous Sulfate 324 (65 Fe) MG Tbec     60 tablet    TAKE ONE TABLET BY MOUTH TWICE DAILY    Iron deficiency       haloperidol decanoate 100 MG/ML injection    HALDOL DECANOATE    1 mL    Inject 100 mg into the muscle every 21 days Order scanned into  [facility], Order from Jody Schoenecker ([facility]) ph:[phone] Received on 12/05/2017. Refills 12.    Schizophrenia, unspecified type (H), Bipolar affective disorder, remission status unspecified (H)       * HALOPERIDOL PO      Take 5 mg by mouth every 3 hours as needed for agitation (up to three daily)        * HALOPERIDOL PO      Take 10 mg by mouth At Bedtime        levothyroxine 50 MCG tablet    SYNTHROID/LEVOTHROID    30 tablet    Take 1 tablet (50 mcg) by mouth daily    Hypothyroidism, unspecified type       LEXAPRO 20 MG tablet   Generic drug:  escitalopram      Take 20 mg by mouth daily    Episodic tension-type headache, not intractable, Bipolar affective disorder, remission status unspecified (H), Schizophrenia, unspecified type (H)       lithium 300 MG tablet      Take 300 mg by mouth 2 times daily        LORAZEPAM PO      Take 1 mg by mouth every 3 hours as needed for anxiety        nicotine polacrilex 2 MG gum    NICORETTE STARTER KIT    100 tablet    Place 1 each (2 mg) inside cheek as needed for smoking cessation As needed every 1-2 hours    Cigarette nicotine dependence with nicotine-induced disorder       * order for DME     1 each    INCONTINENT PRODUCTS (UP / MONTH)    Chronic constipation, Schizophrenia, unspecified type (H), Schizoaffective disorder, unspecified type (H), Disturbance of conduct, Extrapyramidal symptom, Urinary incontinence, unspecified type, Bipolar affective disorder, remission status unspecified (H)       * order for DME     1 each    GLOVES    Chronic constipation, Schizophrenia, unspecified type (H), Schizoaffective disorder, unspecified type (H), Disturbance of conduct, Extrapyramidal symptom, Urinary incontinence, unspecified type, Bipolar affective disorder, remission status unspecified (H)       * order for DME     1 each    INCONTINENT PERSONAL WIPES    Chronic constipation, Schizophrenia, unspecified type (H), Schizoaffective disorder,  unspecified type (H), Disturbance of conduct, Extrapyramidal symptom, Urinary incontinence, unspecified type, Bipolar affective disorder, remission status unspecified (H)       * order for DME     1 each    CHUX PADS    Chronic constipation, Schizophrenia, unspecified type (H), Schizoaffective disorder, unspecified type (H), Disturbance of conduct, Extrapyramidal symptom, Urinary incontinence, unspecified type, Bipolar affective disorder, remission status unspecified (H)       pantoprazole 20 MG EC tablet    PROTONIX    30 tablet    TAKE ONE TABLET BY MOUTH EVERY DAY    Gastroesophageal reflux disease without esophagitis       polyethylene glycol powder    MIRALAX    510 g    Take 17 g (1 capful) by mouth daily    Chronic constipation       sennosides 8.6 MG tablet    SENOKOT    200 tablet    TAKE THREE TABLETS BY MOUTH TWICE DAILY    Chronic constipation       simethicone 80 MG chewable tablet    MYLICON    180 tablet    Take 1 tablet (80 mg) by mouth every 6 hours as needed for flatulence or cramping PRN    Flatulence, eructation and gas pain       TRAZODONE HCL PO      Take 200 mg by mouth At Bedtime        VENTOLIN  (90 Base) MCG/ACT Inhaler   Generic drug:  albuterol     18 Inhaler    Inhale 2 puffs into the lungs every 6 hours (PRN for shortness of breath)    Asthma, intermittent, uncomplicated       * Notice:  This list has 6 medication(s) that are the same as other medications prescribed for you. Read the directions carefully, and ask your doctor or other care provider to review them with you.

## 2018-05-25 DIAGNOSIS — K59.09 CHRONIC CONSTIPATION: ICD-10-CM

## 2018-05-25 RX ORDER — POLYETHYLENE GLYCOL 3350 17 G/17G
POWDER, FOR SOLUTION ORAL
Qty: 527 G | Refills: 11 | Status: SHIPPED | OUTPATIENT
Start: 2018-05-25 | End: 2019-06-24

## 2018-05-25 NOTE — TELEPHONE ENCOUNTER
Prescription approved per [facility] Refill Protocol.  Sophia CHILDERS RN

## 2018-05-26 LAB
CLOZAPINE AND METABOLITES TOTAL: 789 NG/ML
CLOZAPINE SERPL-MCNC: 525 NG/ML
CLOZAPINE-N-OXIDE QUANT: <100 NG/ML
NORCLOZAPINE SERPL-MCNC: 264 NG/ML

## 2018-06-05 ENCOUNTER — TELEPHONE (OUTPATIENT)
Dept: FAMILY MEDICINE | Facility: CLINIC | Age: 48
End: 2018-06-05

## 2018-06-05 NOTE — TELEPHONE ENCOUNTER
Reason for Call:  Other     Detailed comments: Traci Nation - [phone] - Can the patient get her Haldol shot on Monday the 11th instead of the 12th because she will be out of town.  They will schedule her following Haldol shot on June 29th - Is it ok to get it monday? It makes it 18 days apart instead of 21 days    Phone Number Patient can be reached at:     Best Time:     Can we leave a detailed message on this number? YES    Call taken on 6/5/2018 at 12:17 PM by Janet Saleem

## 2018-06-05 NOTE — TELEPHONE ENCOUNTER
Spoke with Traci who states discussed this with psych provider- authorized as [place].   Nurse visit for next Haldol inj rescheduled for 06-11-18, Traci will then schedule for following inj 06-29-18.  DIANA Shukla RN

## 2018-06-11 ENCOUNTER — ALLIED HEALTH/NURSE VISIT (OUTPATIENT)
Dept: FAMILY MEDICINE | Facility: CLINIC | Age: 48
End: 2018-06-11
Payer: MEDICARE

## 2018-06-11 DIAGNOSIS — F20.9 SCHIZOPHRENIA, UNSPECIFIED TYPE (H): Chronic | ICD-10-CM

## 2018-06-11 DIAGNOSIS — F20.0 PARANOID SCHIZOPHRENIA, SUBCHRONIC CONDITION WITH ACUTE EXACERBATION (H): ICD-10-CM

## 2018-06-11 DIAGNOSIS — F31.9 BIPOLAR AFFECTIVE DISORDER, REMISSION STATUS UNSPECIFIED (H): Chronic | ICD-10-CM

## 2018-06-11 PROCEDURE — 96372 THER/PROPH/DIAG INJ SC/IM: CPT

## 2018-06-11 PROCEDURE — 99207 ZZC NO CHARGE NURSE ONLY: CPT

## 2018-06-11 NOTE — NURSING NOTE
The following medication was given:     MEDICATION: Haloperidol 100 mg/mL  ROUTE: IM  SITE: [facility]  DOSE: 1ml  LOT #: [id number]  :  [employer]  EXPIRATION DATE:  10/2019  NDC#: 55613-066-71    Dee Dee Perales MA

## 2018-06-11 NOTE — MR AVS SNAPSHOT
After Visit Summary   6/11/2018    Doreen Gramajo    MRN: [id number]           Patient Information     Date Of Birth          1970        Visit Information        Provider Department      6/11/2018 4:15 PM FL PI GREY/LPN [facility]        Today's Diagnoses     Bipolar affective disorder, remission status unspecified (H)        Schizophrenia, unspecified type (H)        Paranoid schizophrenia, subchronic condition with acute exacerbation (H)           Follow-ups after your visit        Your next 10 appointments already scheduled     Jun 19, 2018  9:15 AM CDT   LAB with PI LAB   [facility]    [address] [place] [address]   [phone]           Please do not eat 10-12 hours before your appointment if you are coming in fasting for labs on lipids, cholesterol, or glucose (sugar). This does not apply to pregnant women. Water, hot tea and black coffee (with nothing added) are okay. Do not drink other fluids, diet soda or chew gum.            Jun 20, 2018  4:00 PM CDT   SHORT with Josey Bonilla CNP   [facility] ([facility]    [address] [place] [address]   [phone]              Who to contact     If you have questions or need follow up information about today's clinic visit or your schedule please contact [facility] directly at [phone].  Normal or non-critical lab and imaging results will be communicated to you by MyChart, letter or phone within 4 business days after the clinic has received the results. If you do not hear from us within 7 days, please contact the clinic through MyChart or phone. If you have a critical or abnormal lab result, we will notify you by phone as soon as possible.  Submit refill requests through Fl[employer] or call your pharmacy and they will forward the refill request to us. Please allow 3 business days for your refill to  be completed.          Additional Information About Your Visit        Care EveryWhere ID     This is your Care EveryWhere ID. This could be used by other organizations to access your [facility] medical records  [id number]         Blood Pressure from Last 3 Encounters:   05/17/18 116/66   11/14/17 104/68   07/18/17 124/68    Weight from Last 3 Encounters:   05/17/18 139 lb (63 kg)   11/14/17 139 lb (63 kg)   07/18/17 154 lb (69.9 kg)              We Performed the Following     HALOPERIDOL DECANOATE INJ     INJECTION INTRAMUSCULAR OR SUB-Q        Primary Care Provider Office Phone # Fax #    Josey Bonilla, [facility] [phone]       [address] [facility] [address]        Equal Access to Services     JOURDAN MARTINEZ : Hadii maxwell silvao Sobebaali, waaxda luqadaha, qaybta kaalmada adeegyada, alise gutierrez . So [facility] [phone].    ATENCIÓN: Si habla español, tiene a mariano disposición servicios gratuitos de asistencia lingüística. Llame al [phone].    We comply with applicable federal civil rights laws and [place] laws. We do not discriminate on the basis of race, color, national origin, age, disability, sex, sexual orientation, or gender identity.            Thank you!     Thank you for choosing [facility]  for your care. Our goal is always to provide you with excellent care. Hearing back from our patients is one way we can continue to improve our services. Please take a few minutes to complete the written survey that you may receive in the mail after your visit with us. Thank you!             Your Updated Medication List - Protect others around you: Learn how to safely use, store and throw away your medicines at www.disposemymeds.org.          This list is accurate as of 6/11/18  4:27 PM.  Always use your most recent med list.                   Brand Name Dispense Instructions for use Diagnosis    acetaminophen 500 MG tablet    TYLENOL    100  tablet    Take 2 tablets (1,000 mg) by mouth every 8 hours as needed For headache    Episodic tension-type headache, not intractable       atropine 0.1 mg/mL Susp suspension      Take by mouth At Bedtime        B Complex-C Tabs     30 tablet    Take 1 tablet by mouth daily    Iron deficiency       CERTAVITE/ANTIOXIDANTS Tabs tablet   Generic drug:  multivitamin, therapeutic with minerals     30 tablet    TAKE ONE TABLET BY MOUTH EVERY DAY    Schizoaffective disorder, unspecified type (H)       Clozapine 200 MG tablet    CLOZARIL     150 mg at breakfast and 650 mg after dinner        DEPEND UNDERGARMENTS Misc     31 each    1 each daily as needed MEDIUM sized briefs    Urinary incontinence, unspecified type       DIPHENDRYL PO      Take 50 mg by mouth 3 times daily as needed        Ferrous Sulfate 324 (65 Fe) MG Tbec     60 tablet    TAKE ONE TABLET BY MOUTH TWICE DAILY    Iron deficiency       haloperidol decanoate 100 MG/ML injection    HALDOL DECANOATE    1 mL    Inject 100 mg into the muscle every 21 days Order scanned into [employer], Order from Jody Schoenecker ([facility]) ph:[phone] Received on 12/05/2017. Refills 12.    Schizophrenia, unspecified type (H), Bipolar affective disorder, remission status unspecified (H)       * HALOPERIDOL PO      Take 5 mg by mouth every 3 hours as needed for agitation (up to three daily)        * HALOPERIDOL PO      Take 10 mg by mouth At Bedtime        levothyroxine 50 MCG tablet    SYNTHROID/LEVOTHROID    30 tablet    Take 1 tablet (50 mcg) by mouth daily    Hypothyroidism, unspecified type       LEXAPRO 20 MG tablet   Generic drug:  escitalopram      Take 20 mg by mouth daily    Episodic tension-type headache, not intractable, Bipolar affective disorder, remission status unspecified (H), Schizophrenia, unspecified type (H)       lithium 300 MG tablet      Take 300 mg by mouth 2 times daily        LORAZEPAM PO      Take 1 mg by mouth every 3 hours as  needed for anxiety        nicotine polacrilex 2 MG gum    NICORETTE STARTER KIT    100 tablet    Place 1 each (2 mg) inside cheek as needed for smoking cessation As needed every 1-2 hours    Cigarette nicotine dependence with nicotine-induced disorder       * order for DME     1 each    INCONTINENT PRODUCTS (UP / MONTH)    Chronic constipation, Schizophrenia, unspecified type (H), Schizoaffective disorder, unspecified type (H), Disturbance of conduct, Extrapyramidal symptom, Urinary incontinence, unspecified type, Bipolar affective disorder, remission status unspecified (H)       * order for DME     1 each    GLOVES    Chronic constipation, Schizophrenia, unspecified type (H), Schizoaffective disorder, unspecified type (H), Disturbance of conduct, Extrapyramidal symptom, Urinary incontinence, unspecified type, Bipolar affective disorder, remission status unspecified (H)       * order for DME     1 each    INCONTINENT PERSONAL WIPES    Chronic constipation, Schizophrenia, unspecified type (H), Schizoaffective disorder, unspecified type (H), Disturbance of conduct, Extrapyramidal symptom, Urinary incontinence, unspecified type, Bipolar affective disorder, remission status unspecified (H)       * order for DME     1 each    CHUX PADS    Chronic constipation, Schizophrenia, unspecified type (H), Schizoaffective disorder, unspecified type (H), Disturbance of conduct, Extrapyramidal symptom, Urinary incontinence, unspecified type, Bipolar affective disorder, remission status unspecified (H)       pantoprazole 20 MG EC tablet    PROTONIX    30 tablet    TAKE ONE TABLET BY MOUTH EVERY DAY    Gastroesophageal reflux disease without esophagitis       polyethylene glycol powder    MIRALAX/GLYCOLAX    527 g    Take 17 g (1 capful) by mouth daily    Chronic constipation       sennosides 8.6 MG tablet    SENOKOT    200 tablet    TAKE THREE TABLETS BY MOUTH TWICE DAILY    Chronic constipation       simethicone 80 MG chewable  tablet    MYLICON    180 tablet    Take 1 tablet (80 mg) by mouth every 6 hours as needed for flatulence or cramping PRN    Flatulence, eructation and gas pain       TRAZODONE HCL PO      Take 200 mg by mouth At Bedtime        VENTOLIN  (90 Base) MCG/ACT Inhaler   Generic drug:  albuterol     18 Inhaler    Inhale 2 puffs into the lungs every 6 hours (PRN for shortness of breath)    Asthma, intermittent, uncomplicated       * Notice:  This list has 6 medication(s) that are the same as other medications prescribed for you. Read the directions carefully, and ask your doctor or other care provider to review them with you.

## 2018-06-19 DIAGNOSIS — Z79.899 NEED FOR PROPHYLACTIC CHEMOTHERAPY: ICD-10-CM

## 2018-06-19 DIAGNOSIS — Z79.899 ENCOUNTER FOR LONG-TERM (CURRENT) USE OF MEDICATIONS: ICD-10-CM

## 2018-06-19 LAB
BASOPHILS # BLD AUTO: 0 10E9/L (ref 0–0.2)
BASOPHILS NFR BLD AUTO: 0.3 %
DIFFERENTIAL METHOD BLD: ABNORMAL
EOSINOPHIL # BLD AUTO: 0 10E9/L (ref 0–0.7)
EOSINOPHIL NFR BLD AUTO: 0.1 %
ERYTHROCYTE [DISTWIDTH] IN BLOOD BY AUTOMATED COUNT: 13.7 % (ref 10–15)
HCT VFR BLD AUTO: 38.6 % (ref 35–47)
HGB BLD-MCNC: 12.4 G/DL (ref 11.7–15.7)
LYMPHOCYTES # BLD AUTO: 2.1 10E9/L (ref 0.8–5.3)
LYMPHOCYTES NFR BLD AUTO: 20.6 %
MCH RBC QN AUTO: 33.4 PG (ref 26.5–33)
MCHC RBC AUTO-ENTMCNC: 32.1 G/DL (ref 31.5–36.5)
MCV RBC AUTO: 104 FL (ref 78–100)
MONOCYTES # BLD AUTO: 0.7 10E9/L (ref 0–1.3)
MONOCYTES NFR BLD AUTO: 6.4 %
NEUTROPHILS # BLD AUTO: 7.5 10E9/L (ref 1.6–8.3)
NEUTROPHILS NFR BLD AUTO: 72.6 %
PLATELET # BLD AUTO: 396 10E9/L (ref 150–450)
RBC # BLD AUTO: 3.71 10E12/L (ref 3.8–5.2)
WBC # BLD AUTO: 10.4 10E9/L (ref 4–11)

## 2018-06-19 PROCEDURE — 80159 DRUG ASSAY CLOZAPINE: CPT | Mod: 90 | Performed by: NURSE PRACTITIONER

## 2018-06-19 PROCEDURE — 36415 COLL VENOUS BLD VENIPUNCTURE: CPT | Performed by: NURSE PRACTITIONER

## 2018-06-19 PROCEDURE — 85025 COMPLETE CBC W/AUTO DIFF WBC: CPT | Performed by: NURSE PRACTITIONER

## 2018-06-20 ENCOUNTER — OFFICE VISIT (OUTPATIENT)
Dept: FAMILY MEDICINE | Facility: CLINIC | Age: 48
End: 2018-06-20
Payer: MEDICARE

## 2018-06-20 VITALS
OXYGEN SATURATION: 100 % | DIASTOLIC BLOOD PRESSURE: 82 MMHG | BODY MASS INDEX: 25.97 KG/M2 | TEMPERATURE: 98.5 F | WEIGHT: 142 LBS | RESPIRATION RATE: 16 BRPM | HEART RATE: 86 BPM | SYSTOLIC BLOOD PRESSURE: 106 MMHG

## 2018-06-20 DIAGNOSIS — G44.219 EPISODIC TENSION-TYPE HEADACHE, NOT INTRACTABLE: Primary | ICD-10-CM

## 2018-06-20 DIAGNOSIS — J45.20 ASTHMA, INTERMITTENT, UNCOMPLICATED: Chronic | ICD-10-CM

## 2018-06-20 PROCEDURE — 99213 OFFICE O/P EST LOW 20 MIN: CPT | Performed by: NURSE PRACTITIONER

## 2018-06-20 RX ORDER — DESVENLAFAXINE 50 MG/1
50 TABLET, FILM COATED, EXTENDED RELEASE ORAL DAILY
COMMUNITY
Start: 2018-05-29 | End: 2019-07-30

## 2018-06-20 RX ORDER — ALBUTEROL SULFATE 90 UG/1
AEROSOL, METERED RESPIRATORY (INHALATION)
Qty: 18 G | Refills: 6 | Status: SHIPPED | OUTPATIENT
Start: 2018-06-20 | End: 2019-07-30

## 2018-06-20 RX ORDER — SODIUM FLUORIDE 5 MG/ML
PASTE, DENTIFRICE DENTAL AT BEDTIME
COMMUNITY
End: 2019-12-03

## 2018-06-20 ASSESSMENT — PAIN SCALES - GENERAL: PAINLEVEL: NO PAIN (0)

## 2018-06-20 NOTE — PATIENT INSTRUCTIONS
"1. Asthma, intermittent, uncomplicated  Chronic, stable  - albuterol (VENTOLIN HFA) 108 (90 Base) MCG/ACT Inhaler; Inhale 2 puffs into the lungs every 6 hours (PRN for shortness of breath)  Dispense: 18 g; Refill: 6    2. Episodic tension-type headache, not intractable  Historical, stable  - 8 oz glass of water with the start of a headache  - May use Tylenol per standing orders as needed  - D/C extra strength  - Massage neck muscles, heat or cold 20 minutes at a time as needed  - Stress reduction: coping skills, deep breathing exercises, getting exercise        * Tension Headache    Muscle Tension Headache (also called \"stress headache\") is a very common cause of head pain. Under stress, some people tense the muscles of their shoulder, neck and scalp without knowing it. If this lasts long enough, a headache can occur. These headaches can be very painful and last for hours or even days.  Home Care:    If you were given pain medicine for this headache, do not drive yourself home. Arrange for a ride, instead. When you get home, try to sleep. You should feel much better when you wake up.    Heat to the back of your neck may relieve neck spasm.    Drink only clear liquids or eat a very light diet to avoid nausea/vomiting until symptoms improve.  Preventing Future Headaches    Identify the sources of stress in your life. These may not be obvious! Learn new ways to handle your stress, such as regular exercise, biofeedback, self-hypnosis and meditation. For more information about this, consult your doctor or go to a local bookstore and review the many books and tapes on this subject.    At the first sign of a tension headache, take time out if possible. Remove yourself from the stressful situation, find a quiet comfortable place to sit or lie down and let yourself relax. Heat and deep massage of the tight areas in the neck and shoulders may help reduce muscle spasm. Medicine, such as ibuprofen (Advil or Motrin) or a " prescribed muscle relaxant may be helpful at this point.  Follow Up with your doctor if the headache is not better within the next 24 hours. If you have frequent headaches you should discuss a treatment plan with your primary care doctor. Ask if you can have medicine to take at home the next time you get a bad headache. This may avoid the need for a visit to the emergency department in the future. Poorly controlled chronic headaches may require a referral to a neurologist (headache specialist).  Call your Doctor Or Get Prompt Medical Attention if any of the following occur:    Worsening of your head pain or no improvement within 24 hours    Repeated vomiting (unable to keep liquids down)    Fever of 100.4 F (38.0 C) or higher, or as directed by your healthcare provider    Stiff neck    Extreme drowsiness, confusion or fainting    Dizziness, vertigo (dizziness with spinning sensation)    Weakness of an arm or leg or one side of the face    Difficulty with speech or vision    0931-5398 [employer]. [address] [place] [address]. All rights reserved. This information is not intended as a substitute for professional medical care. Always follow your healthcare professional's instructions.  This information has been modified by your health care provider with permission from the publisher.        Self-Care for Headaches  Most headaches aren't serious and can be relieved with self-care. But some headaches may be a sign of another health problem like eye trouble or high blood pressure. To find the best treatment, learn what kind of headaches you get. For tension headaches, self-care will usually help. To treat migraines, ask your healthcare provider for advice. It is also possible to get both tension and migraine headaches. Self-care involves relieving the pain and avoiding headache  triggers  if you can.    Ways to reduce pain and tension  Try these steps:    Apply a cold compress or ice pack to  "the pain site.    Drink fluids. If nausea makes it hard to drink, try sucking on ice.    Rest. Protect yourself from bright light and loud noises.    Calm your emotions by imagining a peaceful scene.    Massage tight neck, shoulder, and head muscles.    To relax muscles, soak in a hot bath or use a hot shower.  Use medicines  Aspirin or aspirin substitutes, such as ibuprofen and acetaminophen, can relieve headache. Remember: Never give aspirin to anyone 18 years old or younger because of the risk of developing Reye syndrome. Use pain medicines only when necessary.  Track your headaches  Keeping a headache diary can help you and your healthcare provider identify what's causing your headaches:    Note when each headache happens.    Identify your activities and the foods you've eaten 6 to 8 hours before the headache began.    Look for any trends or \"triggers.\"  Signs of tension headache  Any of the following can be signs:    Dull pain or feeling of pressure in a tight band around your head    Pain in your neck or shoulders    Headache without a definite beginning or end    Headache after an activity such as driving or working on a computer  Signs of migraine  Any of the following can be signs:    Throbbing pain on one or both sides of your head    Nausea or vomiting    Extreme sensitivity to light, sound, and smells    Bright spots, flashes, or other visual changes    Pain or nausea so severe that you can't continue your daily activities  Call your healthcare provider   If you have any of the following symptoms, contact your healthcare provider:    A headache that lingers after a recent injury or bump to the head.    A fever with a stiff neck or pain when you bend your head toward your chest.    A headache along with slurred speech, changes in your vision, or numbness or weakness in your arms or legs.    A headache for longer than 3 days.    Frequent headaches, especially in the morning.    Headaches with " "seizures     Seek immediate medical attention if you have a headache that you would call \"the worst headache you have ever had.\"   Date Last Reviewed: 10/4/2015    4612-0917 [employer]. [address] [place] [address], [place] [address]. All rights reserved. This information is not intended as a substitute for professional medical care. Always follow your healthcare professional's instructions.        Preventing Tension Headaches  Lifestyle changes are the key to preventing tension-type headaches. Learn what changes in your environment and daily activities can prevent the strain and tension that lead to headaches. If emotional stress is a factor, stress reduction may bring relief. Other lifestyle changes can help keep you healthier and better able to cope with pain.  What may cause your headaches  Causes of tension-type headaches include:    Posture and movement. Your posture while you sit, work, drive, and even sleep can put stress on your shoulders and neck. This can tighten muscles in the back of your head, causing headaches.    Lifting and carrying. These can cause strain on your back and neck, especially if you carry too much weight, carry weight unevenly, or use poor lifting technique.    Certain sports. Activities that involve jumping, running, and sudden starts, stops, or changes of direction can jar your neck and head. This may lead to tight muscles and pain. Weightlifting and other activities that require upper body strength can lead to tight neck and shoulder muscles.    Jaw tension. Clenching your jaw or grinding your teeth can result in tension and pain. This may happen while you sleep without your knowing it.    Eye problems. Eyestrain can cause tension in the muscles around the eyes. Or a problem with your eyeglass prescription can make you hold your head at an awkward angle. This can cause neck strain and headaches.    Emotional stress. Many factors lead to emotional stress: overwork, family " problems, financial difficulties, or sudden life changes. This may cause muscle tension.  What you can do  Once you know what s causing your headaches, you can work to prevent them. You may need to seek professional help to make some of the needed changes.    Posture and movement changes. Change the setup of your workspace and car. Learn and maintain good posture. Avoid positions that strain your neck or shoulders. Make sure your bedding and pillows provide good support. Avoid sleeping on couches, chairs, or floors when a bed is available.     Lifting and carrying. Learn good lifting technique. Make sure to use the proper tools and equipment for lifting.    Change your sport. Switch to a low-impact sport. To help relieve stress on your neck and head, cut back on activities that depend on upper body strength or that put a lot of twisting motion on the back, such as golf.     Dental work. See your dentist if you think your headaches are caused by jaw tension or teeth grinding.    New eyewear. Buy an extra pair of glasses adjusted for reading or working at a computer. This helps to reduce eyestrain and keep your neck at a comfortable angle.    Stress management. Learn techniques for relaxing and reducing emotional stress, like deep breathing, visualization, progressive relaxation, and biofeedback. Regular sleep habits can also help to control stress.    Regular sleep and meals. It is important to have a regular sleep cycle and to avoid skipping meals.  Exercise can help  Exercise can improve overall health and help you to relax.    Neck exercises help keep your neck muscles relaxed during the day. Try the neck exercises shown on this sheet. Start in a neutral (relaxed, centered) position. Do 3 repetitions every 2 to 4 hours throughout the day.    Low-impact aerobic exercise helps keep your muscles strong and flexible. This helps prevent tension and the pain it causes.    Stretching, davida chi, and yoga help keep your  muscles flexible. They may also help relieve emotional stress.    Lower your left ear toward your left shoulder. Return to neutral. Repeat on the right side.   Lower your chin to your chest and slowly return to neutral.     Look to the left. Return to neutral. Then look to the right.     Move your head forward and back while keeping the top of your head level.   Date Last Reviewed: 11/8/2015 2000-2017 [employer]. [address] [place] [address], [place] [address]. All rights reserved. This information is not intended as a substitute for professional medical care. Always follow your healthcare professional's instructions.

## 2018-06-20 NOTE — NURSING NOTE
"Chief Complaint   Patient presents with     Headache     follow up       Initial /82  Pulse 86  Temp 98.5  F (36.9  C) (Tympanic)  Resp 16  Wt 142 lb (64.4 kg)  SpO2 100%  BMI 25.97 kg/m2 Estimated body mass index is 25.97 kg/(m^2) as calculated from the following:    Height as of 5/17/18: 5' 2\" (1.575 m).    Weight as of this encounter: 142 lb (64.4 kg).      Health Maintenance that is potentially due pending provider review:  ACT    Possibly completing today per provider review.    Is there anyone who you would like to be able to receive your results? No  If yes have patient fill out WOODY      "

## 2018-06-20 NOTE — PROGRESS NOTES
SUBJECTIVE:   Doreen Gramajo is a 47 year old female who presents to clinic today for the following health issues:      Headaches      Duration: 1 month f/u  States she has had less headaches  Had a tooth removed - dentist stated that this could have contributed to her headaches  Pt brought headache journal with her      Asthma Follow-Up  Was ACT completed today?    Yes  Scored 22 today  ACT Total Scores 7/18/2017   ACT TOTAL SCORE -   ASTHMA ER VISITS -   ASTHMA HOSPITALIZATIONS -   ACT TOTAL SCORE (Goal Greater than or Equal to 20) 22   In the past 12 months, how many times did you visit the emergency room for your asthma without being admitted to the hospital? 0   In the past 12 months, how many times were you hospitalized overnight because of your asthma? 0       Recent asthma triggers that patient is dealing with: None          HPI:   PCP:  Josey Bonilla, [facility] [phone]    Patient Active Problem List   Diagnosis     Schizophrenia (H)     Hypothyroidism     Bipolar affective (H)     Asthma, intermittent     Gastroesophageal reflux disease without esophagitis     CARDIOVASCULAR SCREENING; LDL GOAL LESS THAN 160     Health Care Home     Psychosis     Behavior disturbance     Cigarette nicotine dependence with nicotine-induced disorder     Iron deficiency     Chronic constipation     Urinary incontinence, unspecified type     Borderline intellectual functioning     History of anorexia nervosa     Rectal prolapse     Extrapyramidal symptom     History of eating disorder     Disturbance of conduct     Paranoid schizophrenia, subchronic condition with acute exacerbation (H)     Current Outpatient Prescriptions   Medication     albuterol (VENTOLIN HFA) 108 (90 Base) MCG/ACT Inhaler     B Complex-C TABS     CERTAVITE/ANTIOXIDANTS TABS tablet     cloZAPine (CLOZARIL) 200 MG tablet     desvenlafaxine succinate (PRISTIQ) 50 MG 24 hr tablet     DiphenhydrAMINE HCl (DIPHENDRYL PO)     Ferrous Sulfate 324 (65 FE)  MG TBEC     haloperidol decanoate (HALDOL DECANOATE) 100 MG/ML injection     HALOPERIDOL PO     HALOPERIDOL PO     Incontinence Supply Disposable (DEPEND UNDERGARMENTS) MISC     levothyroxine (SYNTHROID/LEVOTHROID) 50 MCG tablet     lithium 300 MG tablet     LORAZEPAM PO     nicotine polacrilex (NICORETTE STARTER KIT) 2 MG gum     order for DME     order for DME     order for DME     order for DME     pantoprazole (PROTONIX) 20 MG EC tablet     polyethylene glycol (MIRALAX/GLYCOLAX) powder     sennosides (SENOKOT) 8.6 MG tablet     simethicone (MYLICON) 80 MG chewable tablet     Sodium Fluoride (SF 5000 PLUS) 1.1 % CREA     TRAZODONE HCL PO     atropine 0.1 mg/mL     escitalopram (LEXAPRO) 20 MG tablet     [DISCONTINUED] VENTOLIN  (90 Base) MCG/ACT Inhaler     No current facility-administered medications for this visit.        Health Maintenance Due   Topic Date Due     PHQ-2 Q1 YR  10/09/1982     ASTHMA CONTROL TEST Q6 MOS  01/18/2018     ASTHMA ACTION PLAN Q1 YR  07/18/2018       Reviewed and updated:  Tobacco  Allergies  Meds  Med Hx  Surg Hx  Fam Hx  Soc Hx     ROS:  Constitutional, HEENT, cardiovascular, pulmonary, gi and gu systems are negative, except as otherwise noted.    PHYSICAL EXAM:   /82  Pulse 86  Temp 98.5  F (36.9  C) (Tympanic)  Resp 16  Wt 142 lb (64.4 kg)  SpO2 100%  BMI 25.97 kg/m2  Body mass index is 25.97 kg/(m^2).  GENERAL APPEARANCE: healthy, alert and no distress  EYES: Eyes grossly normal to inspection, PERRL and conjunctivae and sclerae normal  HENT: ear canals and TM's normal and nose and mouth without ulcers or lesions  NECK: no adenopathy, no asymmetry, masses, or scars and thyroid normal to palpation  RESP: lungs clear to auscultation - no rales, rhonchi or wheezes  CV: regular rates and rhythm, normal S1 S2, no S3 or S4 and no murmur, click or rub  PSYCH: mentation appears normal and affect normal/bright    ASSESSMENT & PLAN:       1. Asthma, intermittent,  "uncomplicated  Chronic, stable  - albuterol (VENTOLIN HFA) 108 (90 Base) MCG/ACT Inhaler; Inhale 2 puffs into the lungs every 6 hours (PRN for shortness of breath)  Dispense: 18 g; Refill: 6    2. Episodic tension-type headache, not intractable  Historical, stable  - 8 oz glass of water with the start of a headache  - May use Tylenol per standing orders as needed  - D/C extra strength  - Massage neck muscles, heat or cold 20 minutes at a time as needed  - Stress reduction: coping skills, deep breathing exercises, getting exercise        * Tension Headache    Muscle Tension Headache (also called \"stress headache\") is a very common cause of head pain. Under stress, some people tense the muscles of their shoulder, neck and scalp without knowing it. If this lasts long enough, a headache can occur. These headaches can be very painful and last for hours or even days.  Home Care:    If you were given pain medicine for this headache, do not drive yourself home. Arrange for a ride, instead. When you get home, try to sleep. You should feel much better when you wake up.    Heat to the back of your neck may relieve neck spasm.    Drink only clear liquids or eat a very light diet to avoid nausea/vomiting until symptoms improve.  Preventing Future Headaches    Identify the sources of stress in your life. These may not be obvious! Learn new ways to handle your stress, such as regular exercise, biofeedback, self-hypnosis and meditation. For more information about this, consult your doctor or go to a local bookstore and review the many books and tapes on this subject.    At the first sign of a tension headache, take time out if possible. Remove yourself from the stressful situation, find a quiet comfortable place to sit or lie down and let yourself relax. Heat and deep massage of the tight areas in the neck and shoulders may help reduce muscle spasm. Medicine, such as ibuprofen (Advil or Motrin) or a prescribed muscle relaxant may " be helpful at this point.  Follow Up with your doctor if the headache is not better within the next 24 hours. If you have frequent headaches you should discuss a treatment plan with your primary care doctor. Ask if you can have medicine to take at home the next time you get a bad headache. This may avoid the need for a visit to the emergency department in the future. Poorly controlled chronic headaches may require a referral to a neurologist (headache specialist).  Call your Doctor Or Get Prompt Medical Attention if any of the following occur:    Worsening of your head pain or no improvement within 24 hours    Repeated vomiting (unable to keep liquids down)    Fever of 100.4 F (38.0 C) or higher, or as directed by your healthcare provider    Stiff neck    Extreme drowsiness, confusion or fainting    Dizziness, vertigo (dizziness with spinning sensation)    Weakness of an arm or leg or one side of the face    Difficulty with speech or vision    4486-5718 [employer]. [address] [place] [address]. All rights reserved. This information is not intended as a substitute for professional medical care. Always follow your healthcare professional's instructions.  This information has been modified by your health care provider with permission from the publisher.        Self-Care for Headaches  Most headaches aren't serious and can be relieved with self-care. But some headaches may be a sign of another health problem like eye trouble or high blood pressure. To find the best treatment, learn what kind of headaches you get. For tension headaches, self-care will usually help. To treat migraines, ask your healthcare provider for advice. It is also possible to get both tension and migraine headaches. Self-care involves relieving the pain and avoiding headache  triggers  if you can.    Ways to reduce pain and tension  Try these steps:    Apply a cold compress or ice pack to the pain site.    Drink fluids.  "If nausea makes it hard to drink, try sucking on ice.    Rest. Protect yourself from bright light and loud noises.    Calm your emotions by imagining a peaceful scene.    Massage tight neck, shoulder, and head muscles.    To relax muscles, soak in a hot bath or use a hot shower.  Use medicines  Aspirin or aspirin substitutes, such as ibuprofen and acetaminophen, can relieve headache. Remember: Never give aspirin to anyone 18 years old or younger because of the risk of developing Reye syndrome. Use pain medicines only when necessary.  Track your headaches  Keeping a headache diary can help you and your healthcare provider identify what's causing your headaches:    Note when each headache happens.    Identify your activities and the foods you've eaten 6 to 8 hours before the headache began.    Look for any trends or \"triggers.\"  Signs of tension headache  Any of the following can be signs:    Dull pain or feeling of pressure in a tight band around your head    Pain in your neck or shoulders    Headache without a definite beginning or end    Headache after an activity such as driving or working on a computer  Signs of migraine  Any of the following can be signs:    Throbbing pain on one or both sides of your head    Nausea or vomiting    Extreme sensitivity to light, sound, and smells    Bright spots, flashes, or other visual changes    Pain or nausea so severe that you can't continue your daily activities  Call your healthcare provider   If you have any of the following symptoms, contact your healthcare provider:    A headache that lingers after a recent injury or bump to the head.    A fever with a stiff neck or pain when you bend your head toward your chest.    A headache along with slurred speech, changes in your vision, or numbness or weakness in your arms or legs.    A headache for longer than 3 days.    Frequent headaches, especially in the morning.    Headaches with seizures     Seek immediate medical attention " "if you have a headache that you would call \"the worst headache you have ever had.\"   Date Last Reviewed: 10/4/2015    7296-3554 [employer]. [address] [place] [address], [place] [address]. All rights reserved. This information is not intended as a substitute for professional medical care. Always follow your healthcare professional's instructions.        Preventing Tension Headaches  Lifestyle changes are the key to preventing tension-type headaches. Learn what changes in your environment and daily activities can prevent the strain and tension that lead to headaches. If emotional stress is a factor, stress reduction may bring relief. Other lifestyle changes can help keep you healthier and better able to cope with pain.  What may cause your headaches  Causes of tension-type headaches include:    Posture and movement. Your posture while you sit, work, drive, and even sleep can put stress on your shoulders and neck. This can tighten muscles in the back of your head, causing headaches.    Lifting and carrying. These can cause strain on your back and neck, especially if you carry too much weight, carry weight unevenly, or use poor lifting technique.    Certain sports. Activities that involve jumping, running, and sudden starts, stops, or changes of direction can jar your neck and head. This may lead to tight muscles and pain. Weightlifting and other activities that require upper body strength can lead to tight neck and shoulder muscles.    Jaw tension. Clenching your jaw or grinding your teeth can result in tension and pain. This may happen while you sleep without your knowing it.    Eye problems. Eyestrain can cause tension in the muscles around the eyes. Or a problem with your eyeglass prescription can make you hold your head at an awkward angle. This can cause neck strain and headaches.    Emotional stress. Many factors lead to emotional stress: overwork, family problems, financial difficulties, or sudden life " changes. This may cause muscle tension.  What you can do  Once you know what s causing your headaches, you can work to prevent them. You may need to seek professional help to make some of the needed changes.    Posture and movement changes. Change the setup of your workspace and car. Learn and maintain good posture. Avoid positions that strain your neck or shoulders. Make sure your bedding and pillows provide good support. Avoid sleeping on couches, chairs, or floors when a bed is available.     Lifting and carrying. Learn good lifting technique. Make sure to use the proper tools and equipment for lifting.    Change your sport. Switch to a low-impact sport. To help relieve stress on your neck and head, cut back on activities that depend on upper body strength or that put a lot of twisting motion on the back, such as golf.     Dental work. See your dentist if you think your headaches are caused by jaw tension or teeth grinding.    New eyewear. Buy an extra pair of glasses adjusted for reading or working at a computer. This helps to reduce eyestrain and keep your neck at a comfortable angle.    Stress management. Learn techniques for relaxing and reducing emotional stress, like deep breathing, visualization, progressive relaxation, and biofeedback. Regular sleep habits can also help to control stress.    Regular sleep and meals. It is important to have a regular sleep cycle and to avoid skipping meals.  Exercise can help  Exercise can improve overall health and help you to relax.    Neck exercises help keep your neck muscles relaxed during the day. Try the neck exercises shown on this sheet. Start in a neutral (relaxed, centered) position. Do 3 repetitions every 2 to 4 hours throughout the day.    Low-impact aerobic exercise helps keep your muscles strong and flexible. This helps prevent tension and the pain it causes.    Stretching, davida chi, and yoga help keep your muscles flexible. They may also help relieve  emotional stress.    Lower your left ear toward your left shoulder. Return to neutral. Repeat on the right side.   Lower your chin to your chest and slowly return to neutral.     Look to the left. Return to neutral. Then look to the right.     Move your head forward and back while keeping the top of your head level.   Date Last Reviewed: 11/8/2015 2000-2017 [employer]. [address] [place] [address]. All rights reserved. This information is not intended as a substitute for professional medical care. Always follow your healthcare professional's instructions.          Risks, benefits, side effects and rationale for treatment plan fully discussed with the patient and understanding expressed.    RENÉ Bhagat-[facility]

## 2018-06-20 NOTE — MR AVS SNAPSHOT
"              After Visit Summary   6/20/2018    Doreen Gramajo    MRN: [id number]           Patient Information     Date Of Birth          1970        Visit Information        Provider Department      6/20/2018 4:00 PM Josey Bonilla, CNP [facility]        Today's Diagnoses     Episodic tension-type headache, not intractable    -  1    Asthma, intermittent, uncomplicated          Care Instructions    1. Asthma, intermittent, uncomplicated  Chronic, stable  - albuterol (VENTOLIN HFA) 108 (90 Base) MCG/ACT Inhaler; Inhale 2 puffs into the lungs every 6 hours (PRN for shortness of breath)  Dispense: 18 g; Refill: 6    2. Episodic tension-type headache, not intractable  Historical, stable  - 8 oz glass of water with the start of a headache  - May use Tylenol per standing orders as needed  - D/C extra strength  - Massage neck muscles, heat or cold 20 minutes at a time as needed  - Stress reduction: coping skills, deep breathing exercises, getting exercise        * Tension Headache    Muscle Tension Headache (also called \"stress headache\") is a very common cause of head pain. Under stress, some people tense the muscles of their shoulder, neck and scalp without knowing it. If this lasts long enough, a headache can occur. These headaches can be very painful and last for hours or even days.  Home Care:    If you were given pain medicine for this headache, do not drive yourself home. Arrange for a ride, instead. When you get home, try to sleep. You should feel much better when you wake up.    Heat to the back of your neck may relieve neck spasm.    Drink only clear liquids or eat a very light diet to avoid nausea/vomiting until symptoms improve.  Preventing Future Headaches    Identify the sources of stress in your life. These may not be obvious! Learn new ways to handle your stress, such as regular exercise, biofeedback, self-hypnosis and meditation. For more information about this, consult your " doctor or go to a local bookstore and review the many books and tapes on this subject.    At the first sign of a tension headache, take time out if possible. Remove yourself from the stressful situation, find a quiet comfortable place to sit or lie down and let yourself relax. Heat and deep massage of the tight areas in the neck and shoulders may help reduce muscle spasm. Medicine, such as ibuprofen (Advil or Motrin) or a prescribed muscle relaxant may be helpful at this point.  Follow Up with your doctor if the headache is not better within the next 24 hours. If you have frequent headaches you should discuss a treatment plan with your primary care doctor. Ask if you can have medicine to take at home the next time you get a bad headache. This may avoid the need for a visit to the emergency department in the future. Poorly controlled chronic headaches may require a referral to a neurologist (headache specialist).  Call your Doctor Or Get Prompt Medical Attention if any of the following occur:    Worsening of your head pain or no improvement within 24 hours    Repeated vomiting (unable to keep liquids down)    Fever of 100.4 F (38.0 C) or higher, or as directed by your healthcare provider    Stiff neck    Extreme drowsiness, confusion or fainting    Dizziness, vertigo (dizziness with spinning sensation)    Weakness of an arm or leg or one side of the face    Difficulty with speech or vision    3411-8154 [employer]. [address] [place] [address], [place] [address]. All rights reserved. This information is not intended as a substitute for professional medical care. Always follow your healthcare professional's instructions.  This information has been modified by your health care provider with permission from the publisher.        Self-Care for Headaches  Most headaches aren't serious and can be relieved with self-care. But some headaches may be a sign of another health problem like eye trouble or high blood  "pressure. To find the best treatment, learn what kind of headaches you get. For tension headaches, self-care will usually help. To treat migraines, ask your healthcare provider for advice. It is also possible to get both tension and migraine headaches. Self-care involves relieving the pain and avoiding headache  triggers  if you can.    Ways to reduce pain and tension  Try these steps:    Apply a cold compress or ice pack to the pain site.    Drink fluids. If nausea makes it hard to drink, try sucking on ice.    Rest. Protect yourself from bright light and loud noises.    Calm your emotions by imagining a peaceful scene.    Massage tight neck, shoulder, and head muscles.    To relax muscles, soak in a hot bath or use a hot shower.  Use medicines  Aspirin or aspirin substitutes, such as ibuprofen and acetaminophen, can relieve headache. Remember: Never give aspirin to anyone 18 years old or younger because of the risk of developing Reye syndrome. Use pain medicines only when necessary.  Track your headaches  Keeping a headache diary can help you and your healthcare provider identify what's causing your headaches:    Note when each headache happens.    Identify your activities and the foods you've eaten 6 to 8 hours before the headache began.    Look for any trends or \"triggers.\"  Signs of tension headache  Any of the following can be signs:    Dull pain or feeling of pressure in a tight band around your head    Pain in your neck or shoulders    Headache without a definite beginning or end    Headache after an activity such as driving or working on a computer  Signs of migraine  Any of the following can be signs:    Throbbing pain on one or both sides of your head    Nausea or vomiting    Extreme sensitivity to light, sound, and smells    Bright spots, flashes, or other visual changes    Pain or nausea so severe that you can't continue your daily activities  Call your healthcare provider   If you have any of the " "following symptoms, contact your healthcare provider:    A headache that lingers after a recent injury or bump to the head.    A fever with a stiff neck or pain when you bend your head toward your chest.    A headache along with slurred speech, changes in your vision, or numbness or weakness in your arms or legs.    A headache for longer than 3 days.    Frequent headaches, especially in the morning.    Headaches with seizures     Seek immediate medical attention if you have a headache that you would call \"the worst headache you have ever had.\"   Date Last Reviewed: 10/4/2015    6672-6495 [employer]. [address] [place] [address]. All rights reserved. This information is not intended as a substitute for professional medical care. Always follow your healthcare professional's instructions.        Preventing Tension Headaches  Lifestyle changes are the key to preventing tension-type headaches. Learn what changes in your environment and daily activities can prevent the strain and tension that lead to headaches. If emotional stress is a factor, stress reduction may bring relief. Other lifestyle changes can help keep you healthier and better able to cope with pain.  What may cause your headaches  Causes of tension-type headaches include:    Posture and movement. Your posture while you sit, work, drive, and even sleep can put stress on your shoulders and neck. This can tighten muscles in the back of your head, causing headaches.    Lifting and carrying. These can cause strain on your back and neck, especially if you carry too much weight, carry weight unevenly, or use poor lifting technique.    Certain sports. Activities that involve jumping, running, and sudden starts, stops, or changes of direction can jar your neck and head. This may lead to tight muscles and pain. Weightlifting and other activities that require upper body strength can lead to tight neck and shoulder muscles.    Jaw " tension. Clenching your jaw or grinding your teeth can result in tension and pain. This may happen while you sleep without your knowing it.    Eye problems. Eyestrain can cause tension in the muscles around the eyes. Or a problem with your eyeglass prescription can make you hold your head at an awkward angle. This can cause neck strain and headaches.    Emotional stress. Many factors lead to emotional stress: overwork, family problems, financial difficulties, or sudden life changes. This may cause muscle tension.  What you can do  Once you know what s causing your headaches, you can work to prevent them. You may need to seek professional help to make some of the needed changes.    Posture and movement changes. Change the setup of your workspace and car. Learn and maintain good posture. Avoid positions that strain your neck or shoulders. Make sure your bedding and pillows provide good support. Avoid sleeping on couches, chairs, or floors when a bed is available.     Lifting and carrying. Learn good lifting technique. Make sure to use the proper tools and equipment for lifting.    Change your sport. Switch to a low-impact sport. To help relieve stress on your neck and head, cut back on activities that depend on upper body strength or that put a lot of twisting motion on the back, such as golf.     Dental work. See your dentist if you think your headaches are caused by jaw tension or teeth grinding.    New eyewear. Buy an extra pair of glasses adjusted for reading or working at a computer. This helps to reduce eyestrain and keep your neck at a comfortable angle.    Stress management. Learn techniques for relaxing and reducing emotional stress, like deep breathing, visualization, progressive relaxation, and biofeedback. Regular sleep habits can also help to control stress.    Regular sleep and meals. It is important to have a regular sleep cycle and to avoid skipping meals.  Exercise can help  Exercise can improve  overall health and help you to relax.    Neck exercises help keep your neck muscles relaxed during the day. Try the neck exercises shown on this sheet. Start in a neutral (relaxed, centered) position. Do 3 repetitions every 2 to 4 hours throughout the day.    Low-impact aerobic exercise helps keep your muscles strong and flexible. This helps prevent tension and the pain it causes.    Stretching, davida chi, and yoga help keep your muscles flexible. They may also help relieve emotional stress.    Lower your left ear toward your left shoulder. Return to neutral. Repeat on the right side.   Lower your chin to your chest and slowly return to neutral.     Look to the left. Return to neutral. Then look to the right.     Move your head forward and back while keeping the top of your head level.   Date Last Reviewed: 11/8/2015 2000-2017 [employer]. [address] [place] [address]. All rights reserved. This information is not intended as a substitute for professional medical care. Always follow your healthcare professional's instructions.                Follow-ups after your visit        Your next 10 appointments already scheduled     Jun 29, 2018  2:00 PM CDT   [facility] with [place] [facility]    [address] [facility] [address]   [phone]            Jul 17, 2018  9:15 AM CDT   LAB with [facility] LAB   [facility]    [address] [place] [address]   [phone]           Please do not eat 10-12 hours before your appointment if you are coming in fasting for labs on lipids, cholesterol, or glucose (sugar). This does not apply to pregnant women. Water, hot tea and black coffee (with nothing added) are okay. Do not drink other fluids, diet soda or chew gum.            Jul 24, 2018 10:00 AM CDT   Nurse Only with [place] PI GREY/LPN   [facility]     [address] [facility] [address]   [phone]              Who to contact     If you have questions or need follow up information about today's clinic visit or your schedule please contact [facility] directly at [phone].  Normal or non-critical lab and imaging results will be communicated to you by MyChart, letter or phone within 4 business days after the clinic has received the results. If you do not hear from us within 7 days, please contact the clinic through MyChart or phone. If you have a critical or abnormal lab result, we will notify you by phone as soon as possible.  Submit refill requests through [email or web address] or call your pharmacy and they will forward the refill request to us. Please allow 3 business days for your refill to be completed.          Additional Information About Your Visit        Care EveryWhere ID     This is your Care EveryWhere ID. This could be used by other organizations to access your [facility] medical records  [id number]        Your Vitals Were     Pulse Temperature Respirations Pulse Oximetry BMI (Body Mass Index)       86 98.5  F (36.9  C) (Tympanic) 16 100% 25.97 kg/m2        Blood Pressure from Last 3 Encounters:   06/20/18 106/82   05/17/18 116/66   11/14/17 104/68    Weight from Last 3 Encounters:   06/20/18 142 lb (64.4 kg)   05/17/18 139 lb (63 kg)   11/14/17 139 lb (63 kg)              Today, you had the following     No orders found for display         Today's Medication Changes          These changes are accurate as of 6/20/18  4:30 PM.  If you have any questions, ask your nurse or doctor.               Stop taking these medicines if you haven't already. Please contact your care team if you have questions.     acetaminophen 500 MG tablet   Commonly known as:  TYLENOL   Stopped by:  Josey Bonilla CNP                Where to get your medicines      These medications were sent to [facility],  - [place] -  [address]  [address] [address]    Hours:  test rx sent successfully  2/8/05  kr Phone:  [phone]     albuterol 108 (90 Base) MCG/ACT Inhaler                Primary Care Provider Office Phone # Fax #    Josey Bonilla, ABIGAIL [phone]       [address] [facility] [address]        Equal Access to Services     JOURDAN MARTINEZ : Hadii aad ku hadasho Soomaali, waaxda luqadaha, qaybta kaalmada adeegyada, waxay idiin hayaan adeeg khtesfayebijal lamodesta . So [facility] [phone].    ATENCIÓN: Si sonla espbenny, tiene a mariano disposición servicios gratuitos de asistencia lingüística. Uyen al [phone].    We comply with applicable federal civil rights laws and [place] laws. We do not discriminate on the basis of race, color, national origin, age, disability, sex, sexual orientation, or gender identity.            Thank you!     Thank you for choosing [facility]  for your care. Our goal is always to provide you with excellent care. Hearing back from our patients is one way we can continue to improve our services. Please take a few minutes to complete the written survey that you may receive in the mail after your visit with us. Thank you!             Your Updated Medication List - Protect others around you: Learn how to safely use, store and throw away your medicines at www.disposemymeds.org.          This list is accurate as of 6/20/18  4:30 PM.  Always use your most recent med list.                   Brand Name Dispense Instructions for use Diagnosis    albuterol 108 (90 Base) MCG/ACT Inhaler    VENTOLIN HFA    18 g    Inhale 2 puffs into the lungs every 6 hours (PRN for shortness of breath)    Asthma, intermittent, uncomplicated       atropine 0.1 mg/mL Susp suspension      Take by mouth At Bedtime        B Complex-C Tabs     30 tablet    Take 1 tablet by mouth daily    Iron deficiency       CERTAVITE/ANTIOXIDANTS Tabs tablet   Generic drug:  multivitamin,  therapeutic with minerals     30 tablet    TAKE ONE TABLET BY MOUTH EVERY DAY    Schizoaffective disorder, unspecified type (H)       Clozapine 200 MG tablet    CLOZARIL     150 mg at breakfast and 650 mg after dinner        DEPEND UNDERGARMENTS Misc     31 each    1 each daily as needed MEDIUM sized briefs    Urinary incontinence, unspecified type       desvenlafaxine succinate 50 MG 24 hr tablet    PRISTIQ     Take 50 mg by mouth        DIPHENDRYL PO      Take 50 mg by mouth 3 times daily as needed        Ferrous Sulfate 324 (65 Fe) MG Tbec     60 tablet    TAKE ONE TABLET BY MOUTH TWICE DAILY    Iron deficiency       haloperidol decanoate 100 MG/ML injection    HALDOL DECANOATE    1 mL    Inject 100 mg into the muscle every 21 days Order scanned into Tremor Video, Order from Jody Schoenecker ([facility]) ph:[phone] Received on 12/05/2017. Refills 12.    Schizophrenia, unspecified type (H), Bipolar affective disorder, remission status unspecified (H)       * HALOPERIDOL PO      Take 5 mg by mouth every 3 hours as needed for agitation (up to three daily)        * HALOPERIDOL PO      Take 10 mg by mouth At Bedtime        levothyroxine 50 MCG tablet    SYNTHROID/LEVOTHROID    30 tablet    Take 1 tablet (50 mcg) by mouth daily    Hypothyroidism, unspecified type       LEXAPRO 20 MG tablet   Generic drug:  escitalopram      Take 20 mg by mouth daily    Episodic tension-type headache, not intractable, Bipolar affective disorder, remission status unspecified (H), Schizophrenia, unspecified type (H)       lithium 300 MG tablet      Take 300 mg by mouth 2 times daily        LORAZEPAM PO      Take 1 mg by mouth every 3 hours as needed for anxiety        nicotine polacrilex 2 MG gum    NICORETTE STARTER KIT    100 tablet    Place 1 each (2 mg) inside cheek as needed for smoking cessation As needed every 1-2 hours    Cigarette nicotine dependence with nicotine-induced disorder       * order for DME     1 each     INCONTINENT PRODUCTS (UP / MONTH)    Chronic constipation, Schizophrenia, unspecified type (H), Schizoaffective disorder, unspecified type (H), Disturbance of conduct, Extrapyramidal symptom, Urinary incontinence, unspecified type, Bipolar affective disorder, remission status unspecified (H)       * order for DME     1 each    GLOVES    Chronic constipation, Schizophrenia, unspecified type (H), Schizoaffective disorder, unspecified type (H), Disturbance of conduct, Extrapyramidal symptom, Urinary incontinence, unspecified type, Bipolar affective disorder, remission status unspecified (H)       * order for DME     1 each    INCONTINENT PERSONAL WIPES    Chronic constipation, Schizophrenia, unspecified type (H), Schizoaffective disorder, unspecified type (H), Disturbance of conduct, Extrapyramidal symptom, Urinary incontinence, unspecified type, Bipolar affective disorder, remission status unspecified (H)       * order for DME     1 each    CHUX PADS    Chronic constipation, Schizophrenia, unspecified type (H), Schizoaffective disorder, unspecified type (H), Disturbance of conduct, Extrapyramidal symptom, Urinary incontinence, unspecified type, Bipolar affective disorder, remission status unspecified (H)       pantoprazole 20 MG EC tablet    PROTONIX    30 tablet    TAKE ONE TABLET BY MOUTH EVERY DAY    Gastroesophageal reflux disease without esophagitis       polyethylene glycol powder    MIRALAX/GLYCOLAX    527 g    Take 17 g (1 capful) by mouth daily    Chronic constipation       sennosides 8.6 MG tablet    SENOKOT    200 tablet    TAKE THREE TABLETS BY MOUTH TWICE DAILY    Chronic constipation       SF 5000 PLUS 1.1 % Crea   Generic drug:  Sodium Fluoride      Apply to affected area At Bedtime        simethicone 80 MG chewable tablet    MYLICON    180 tablet    Take 1 tablet (80 mg) by mouth every 6 hours as needed for flatulence or cramping PRN    Flatulence, eructation and gas pain       TRAZODONE HCL  PO      Take 200 mg by mouth At Bedtime        * Notice:  This list has 6 medication(s) that are the same as other medications prescribed for you. Read the directions carefully, and ask your doctor or other care provider to review them with you.

## 2018-06-22 DIAGNOSIS — Z79.899 ENCOUNTER FOR LONG-TERM (CURRENT) USE OF MEDICATIONS: ICD-10-CM

## 2018-06-22 DIAGNOSIS — F20.0 PARANOID SCHIZOPHRENIA (H): Primary | ICD-10-CM

## 2018-06-22 LAB
CLOZAPINE AND METABOLITES TOTAL: 599 NG/ML
CLOZAPINE SERPL-MCNC: 387 NG/ML
CLOZAPINE-N-OXIDE QUANT: <100 NG/ML
NORCLOZAPINE SERPL-MCNC: 212 NG/ML

## 2018-06-22 ASSESSMENT — ASTHMA QUESTIONNAIRES: ACT_TOTALSCORE: 22

## 2018-06-29 ENCOUNTER — ALLIED HEALTH/NURSE VISIT (OUTPATIENT)
Dept: FAMILY MEDICINE | Facility: CLINIC | Age: 48
End: 2018-06-29
Payer: MEDICARE

## 2018-06-29 DIAGNOSIS — F20.0 PARANOID SCHIZOPHRENIA, SUBCHRONIC CONDITION WITH ACUTE EXACERBATION (H): ICD-10-CM

## 2018-06-29 DIAGNOSIS — F20.9 SCHIZOPHRENIA, UNSPECIFIED TYPE (H): Chronic | ICD-10-CM

## 2018-06-29 DIAGNOSIS — F31.9 BIPOLAR AFFECTIVE DISORDER, REMISSION STATUS UNSPECIFIED (H): Chronic | ICD-10-CM

## 2018-06-29 PROCEDURE — 99207 ZZC NO CHARGE NURSE ONLY: CPT

## 2018-06-29 PROCEDURE — 96372 THER/PROPH/DIAG INJ SC/IM: CPT

## 2018-06-29 NOTE — MR AVS SNAPSHOT
After Visit Summary   6/29/2018    Doreen Gramajo    MRN: [id number]           Patient Information     Date Of Birth          1970        Visit Information        Provider Department      6/29/2018 2:00 PM FL RALF EDMONDS/LPN [facility]        Today's Diagnoses     Bipolar affective disorder, remission status unspecified (H)        Schizophrenia, unspecified type (H)        Paranoid schizophrenia, subchronic condition with acute exacerbation (H)           Follow-ups after your visit        Your next 10 appointments already scheduled     Jul 17, 2018  9:15 AM CDT   LAB with PI LAB   [facility] ([facility])    [address] [facility] [address]   [phone]           Please do not eat 10-12 hours before your appointment if you are coming in fasting for labs on lipids, cholesterol, or glucose (sugar). This does not apply to pregnant women. Water, hot tea and black coffee (with nothing added) are okay. Do not drink other fluids, diet soda or chew gum.            Jul 24, 2018 10:00 AM CDT   Nurse Only with FL PI CMA/LPN   [facility] ([facility])    [address] [place] [id number]   [phone]              Who to contact     If you have questions or need follow up information about today's clinic visit or your schedule please contact [facility] directly at [phone].  Normal or non-critical lab and imaging results will be communicated to you by MyChart, letter or phone within 4 business days after the clinic has received the results. If you do not hear from us within 7 days, please contact the clinic through MyChart or phone. If you have a critical or abnormal lab result, we will notify you by phone as soon as possible.  Submit refill requests through [employer] or call your pharmacy and they will forward the refill request to us. Please allow 3 business days for your refill to be  completed.          Additional Information About Your Visit        Care EveryWhere ID     This is your Care EveryWhere ID. This could be used by other organizations to access your [place] medical records  [id number]         Blood Pressure from Last 3 Encounters:   06/20/18 106/82   05/17/18 116/66   11/14/17 104/68    Weight from Last 3 Encounters:   06/20/18 142 lb (64.4 kg)   05/17/18 139 lb (63 kg)   11/14/17 139 lb (63 kg)              We Performed the Following     HALOPERIDOL DECANOATE INJ     INJECTION INTRAMUSCULAR OR SUB-Q        Primary Care Provider Office Phone # Fax #    Josey Bonilla, [facility] [phone]       [address] [facility] [address]        Equal Access to Services     JOURDAN MARTINEZ : Hadii aad ku hadasho Soomaali, waaxda luqadaha, qaybta kaalmada adeegyada, alise gutierrez . So [facility] [phone].    ATENCIÓN: Si habla español, tiene a mariano disposición servicios gratuitos de asistencia lingüística. Llame al [phone].    We comply with applicable federal civil rights laws and [place] laws. We do not discriminate on the basis of race, color, national origin, age, disability, sex, sexual orientation, or gender identity.            Thank you!     Thank you for choosing [facility]  for your care. Our goal is always to provide you with excellent care. Hearing back from our patients is one way we can continue to improve our services. Please take a few minutes to complete the written survey that you may receive in the mail after your visit with us. Thank you!             Your Updated Medication List - Protect others around you: Learn how to safely use, store and throw away your medicines at www.disposemymeds.org.          This list is accurate as of 6/29/18  3:50 PM.  Always use your most recent med list.                   Brand Name Dispense Instructions for use Diagnosis    albuterol 108 (90 Base) MCG/ACT Inhaler    VENTOLIN  HFA    18 g    Inhale 2 puffs into the lungs every 6 hours (PRN for shortness of breath)    Asthma, intermittent, uncomplicated       atropine 0.1 mg/mL Susp suspension      Take by mouth At Bedtime        B Complex-C Tabs     30 tablet    Take 1 tablet by mouth daily    Iron deficiency       CERTAVITE/ANTIOXIDANTS Tabs tablet   Generic drug:  multivitamin, therapeutic with minerals     30 tablet    TAKE ONE TABLET BY MOUTH EVERY DAY    Schizoaffective disorder, unspecified type (H)       Clozapine 200 MG tablet    CLOZARIL     150 mg at breakfast and 650 mg after dinner        DEPEND UNDERGARMENTS Misc     31 each    1 each daily as needed MEDIUM sized briefs    Urinary incontinence, unspecified type       desvenlafaxine succinate 50 MG 24 hr tablet    PRISTIQ     Take 50 mg by mouth        DIPHENDRYL PO      Take 50 mg by mouth 3 times daily as needed        Ferrous Sulfate 324 (65 Fe) MG Tbec     60 tablet    TAKE ONE TABLET BY MOUTH TWICE DAILY    Iron deficiency       haloperidol decanoate 100 MG/ML injection    HALDOL DECANOATE    1 mL    Inject 100 mg into the muscle every 21 days Order scanned into [employer], Order from [facility] ([facility]) ph:[phone] Received on 12/05/2017. Refills 12.    Schizophrenia, unspecified type (H), Bipolar affective disorder, remission status unspecified (H)       * HALOPERIDOL PO      Take 5 mg by mouth every 3 hours as needed for agitation (up to three daily)        * HALOPERIDOL PO      Take 10 mg by mouth At Bedtime        levothyroxine 50 MCG tablet    SYNTHROID/LEVOTHROID    30 tablet    Take 1 tablet (50 mcg) by mouth daily    Hypothyroidism, unspecified type       LEXAPRO 20 MG tablet   Generic drug:  escitalopram      Take 20 mg by mouth daily    Episodic tension-type headache, not intractable, Bipolar affective disorder, remission status unspecified (H), Schizophrenia, unspecified type (H)       lithium 300 MG tablet      Take 300 mg by mouth  2 times daily        LORAZEPAM PO      Take 1 mg by mouth every 3 hours as needed for anxiety        nicotine polacrilex 2 MG gum    NICORETTE STARTER KIT    100 tablet    Place 1 each (2 mg) inside cheek as needed for smoking cessation As needed every 1-2 hours    Cigarette nicotine dependence with nicotine-induced disorder       * order for DME     1 each    INCONTINENT PRODUCTS (UP / MONTH)    Chronic constipation, Schizophrenia, unspecified type (H), Schizoaffective disorder, unspecified type (H), Disturbance of conduct, Extrapyramidal symptom, Urinary incontinence, unspecified type, Bipolar affective disorder, remission status unspecified (H)       * order for DME     1 each    GLOVES    Chronic constipation, Schizophrenia, unspecified type (H), Schizoaffective disorder, unspecified type (H), Disturbance of conduct, Extrapyramidal symptom, Urinary incontinence, unspecified type, Bipolar affective disorder, remission status unspecified (H)       * order for DME     1 each    INCONTINENT PERSONAL WIPES    Chronic constipation, Schizophrenia, unspecified type (H), Schizoaffective disorder, unspecified type (H), Disturbance of conduct, Extrapyramidal symptom, Urinary incontinence, unspecified type, Bipolar affective disorder, remission status unspecified (H)       * order for DME     1 each    CHUX PADS    Chronic constipation, Schizophrenia, unspecified type (H), Schizoaffective disorder, unspecified type (H), Disturbance of conduct, Extrapyramidal symptom, Urinary incontinence, unspecified type, Bipolar affective disorder, remission status unspecified (H)       pantoprazole 20 MG EC tablet    PROTONIX    30 tablet    TAKE ONE TABLET BY MOUTH EVERY DAY    Gastroesophageal reflux disease without esophagitis       polyethylene glycol powder    MIRALAX/GLYCOLAX    527 g    Take 17 g (1 capful) by mouth daily    Chronic constipation       sennosides 8.6 MG tablet    SENOKOT    200 tablet    TAKE THREE TABLETS BY  MOUTH TWICE DAILY    Chronic constipation       SF 5000 PLUS 1.1 % Crea   Generic drug:  Sodium Fluoride      Apply to affected area At Bedtime        simethicone 80 MG chewable tablet    MYLICON    180 tablet    Take 1 tablet (80 mg) by mouth every 6 hours as needed for flatulence or cramping PRN    Flatulence, eructation and gas pain       TRAZODONE HCL PO      Take 200 mg by mouth At Bedtime        * Notice:  This list has 6 medication(s) that are the same as other medications prescribed for you. Read the directions carefully, and ask your doctor or other care provider to review them with you.

## 2018-06-29 NOTE — PROGRESS NOTES
YISEL from [facility] provider to give Haldol 3 days early.  Pt leaving for out of town and won't be back until 7/7/18.     LM for staff to bring pt back to get injection done.

## 2018-07-17 DIAGNOSIS — Z79.899 ENCOUNTER FOR LONG-TERM (CURRENT) USE OF MEDICATIONS: ICD-10-CM

## 2018-07-17 DIAGNOSIS — F20.0 PARANOID SCHIZOPHRENIA (H): ICD-10-CM

## 2018-07-17 DIAGNOSIS — Z79.899 NEED FOR PROPHYLACTIC CHEMOTHERAPY: ICD-10-CM

## 2018-07-17 LAB
BASOPHILS # BLD AUTO: 0 10E9/L (ref 0–0.2)
BASOPHILS NFR BLD AUTO: 0.3 %
DIFFERENTIAL METHOD BLD: ABNORMAL
EOSINOPHIL # BLD AUTO: 0 10E9/L (ref 0–0.7)
EOSINOPHIL NFR BLD AUTO: 0 %
ERYTHROCYTE [DISTWIDTH] IN BLOOD BY AUTOMATED COUNT: 13.9 % (ref 10–15)
HCT VFR BLD AUTO: 35.4 % (ref 35–47)
HGB BLD-MCNC: 11.9 G/DL (ref 11.7–15.7)
LYMPHOCYTES # BLD AUTO: 1.9 10E9/L (ref 0.8–5.3)
LYMPHOCYTES NFR BLD AUTO: 20.6 %
MCH RBC QN AUTO: 34.4 PG (ref 26.5–33)
MCHC RBC AUTO-ENTMCNC: 33.6 G/DL (ref 31.5–36.5)
MCV RBC AUTO: 102 FL (ref 78–100)
MONOCYTES # BLD AUTO: 0.7 10E9/L (ref 0–1.3)
MONOCYTES NFR BLD AUTO: 7.4 %
NEUTROPHILS # BLD AUTO: 6.6 10E9/L (ref 1.6–8.3)
NEUTROPHILS NFR BLD AUTO: 71.7 %
PLATELET # BLD AUTO: 384 10E9/L (ref 150–450)
RBC # BLD AUTO: 3.46 10E12/L (ref 3.8–5.2)
WBC # BLD AUTO: 9.2 10E9/L (ref 4–11)

## 2018-07-17 PROCEDURE — 85025 COMPLETE CBC W/AUTO DIFF WBC: CPT | Performed by: NURSE PRACTITIONER

## 2018-07-17 PROCEDURE — 36415 COLL VENOUS BLD VENIPUNCTURE: CPT | Performed by: NURSE PRACTITIONER

## 2018-07-17 PROCEDURE — 80159 DRUG ASSAY CLOZAPINE: CPT | Mod: 90 | Performed by: NURSE PRACTITIONER

## 2018-07-20 LAB
CLOZAPINE AND METABOLITES TOTAL: 540 NG/ML
CLOZAPINE SERPL-MCNC: 343 NG/ML
CLOZAPINE-N-OXIDE QUANT: <100 NG/ML
NORCLOZAPINE SERPL-MCNC: 197 NG/ML

## 2018-07-24 ENCOUNTER — ALLIED HEALTH/NURSE VISIT (OUTPATIENT)
Dept: FAMILY MEDICINE | Facility: CLINIC | Age: 48
End: 2018-07-24
Payer: MEDICARE

## 2018-07-24 ENCOUNTER — OFFICE VISIT (OUTPATIENT)
Dept: FAMILY MEDICINE | Facility: CLINIC | Age: 48
End: 2018-07-24
Payer: MEDICARE

## 2018-07-24 ENCOUNTER — MEDICAL CORRESPONDENCE (OUTPATIENT)
Dept: HEALTH INFORMATION MANAGEMENT | Facility: CLINIC | Age: 48
End: 2018-07-24

## 2018-07-24 VITALS
HEIGHT: 62 IN | SYSTOLIC BLOOD PRESSURE: 122 MMHG | TEMPERATURE: 97.8 F | WEIGHT: 141 LBS | RESPIRATION RATE: 20 BRPM | BODY MASS INDEX: 25.95 KG/M2 | HEART RATE: 104 BPM | DIASTOLIC BLOOD PRESSURE: 66 MMHG

## 2018-07-24 DIAGNOSIS — E03.9 HYPOTHYROIDISM, UNSPECIFIED TYPE: Chronic | ICD-10-CM

## 2018-07-24 DIAGNOSIS — Z13.6 CARDIOVASCULAR SCREENING; LDL GOAL LESS THAN 160: ICD-10-CM

## 2018-07-24 DIAGNOSIS — F20.9 SCHIZOPHRENIA, UNSPECIFIED TYPE (H): Chronic | ICD-10-CM

## 2018-07-24 DIAGNOSIS — J98.01 ACUTE BRONCHOSPASM: ICD-10-CM

## 2018-07-24 DIAGNOSIS — H04.123 DRY EYES: ICD-10-CM

## 2018-07-24 DIAGNOSIS — F17.219 CIGARETTE NICOTINE DEPENDENCE WITH NICOTINE-INDUCED DISORDER: ICD-10-CM

## 2018-07-24 DIAGNOSIS — Z78.9 LIVES IN GROUP HOME: Primary | ICD-10-CM

## 2018-07-24 DIAGNOSIS — R32 URINARY INCONTINENCE, UNSPECIFIED TYPE: ICD-10-CM

## 2018-07-24 DIAGNOSIS — K59.09 CHRONIC CONSTIPATION: ICD-10-CM

## 2018-07-24 DIAGNOSIS — F20.0 PARANOID SCHIZOPHRENIA, SUBCHRONIC CONDITION WITH ACUTE EXACERBATION (H): ICD-10-CM

## 2018-07-24 DIAGNOSIS — F31.9 BIPOLAR AFFECTIVE DISORDER, REMISSION STATUS UNSPECIFIED (H): Chronic | ICD-10-CM

## 2018-07-24 DIAGNOSIS — Z13.6 CARDIOVASCULAR SCREENING; LDL GOAL LESS THAN 160: Primary | ICD-10-CM

## 2018-07-24 DIAGNOSIS — K21.9 GASTROESOPHAGEAL REFLUX DISEASE WITHOUT ESOPHAGITIS: ICD-10-CM

## 2018-07-24 DIAGNOSIS — E03.9 HYPOTHYROIDISM: Chronic | ICD-10-CM

## 2018-07-24 PROCEDURE — 99207 ZZC NO CHARGE NURSE ONLY: CPT

## 2018-07-24 PROCEDURE — 99214 OFFICE O/P EST MOD 30 MIN: CPT | Mod: 25 | Performed by: NURSE PRACTITIONER

## 2018-07-24 PROCEDURE — 96372 THER/PROPH/DIAG INJ SC/IM: CPT

## 2018-07-24 NOTE — PATIENT INSTRUCTIONS
1. Lives in group home/Group Home Physical  [facility]    2. Schizophrenia, unspecified type (H)  Chronic, stable  [facility]    3. Bipolar affective disorder, remission status unspecified (H)  Chronic, stable  [facility]    4. Hypothyroidism, unspecified type  Chronic, stable  - TSH with free T4 reflex    5. Urinary incontinence, unspecified type  Chronic, stable  - UROLOGY ADULT REFERRAL    6. Chronic constipation  Chronic, stable  Follows with Dr. Briceño  Push fluids    7. Cigarette nicotine dependence with nicotine-induced disorder  Chronic, stable  Nicorette gum refilled today  - nicotine polacrilex (NICORETTE STARTER KIT) 2 MG gum; Place 1 each (2 mg) inside cheek as needed for smoking cessation  Dispense: 90 tablet; Refill: 0    8. Acute bronchospasm  Chronic, stable    9. Gastroesophageal reflux disease without esophagitis  Chronic, stable    10. CARDIOVASCULAR SCREENING; LDL GOAL LESS THAN 160  Screening  - Lipid panel reflex to direct LDL Fasting    11. Dry eyes  Chronic, stable      Constipation (Adult)  Constipation means that you have bowel movements that are less frequent than usual. Stools often become very hard and difficult to pass.  Constipation is very common. At some point in life it affects almost everyone. Since everyone's bowel habits are different, what is constipation to one person may not be to another. Your healthcare provider may do tests to diagnose constipation. It depends on what he or she finds when evaluating you.    Symptoms of constipation include:    Abdominal pain    Bloating    Vomiting    Painful bowel movements    Itching, swelling, bleeding, or pain around the anus  Causes  Constipation can have many causes. These include:    Diet low in fiber    Too much dairy    Not drinking enough liquids    Lack of exercise or physical activity. This is especially true for older adults.    Changes in lifestyle or daily  routine, including pregnancy, aging, work, and travel    Frequent use or misuse of laxatives    Ignoring the urge to have a bowel movement or delaying it until later    Medicines, such as certain prescription pain medicines, iron supplements, antacids, certain antidepressants, and calcium supplements    Diseases like irritable bowel syndrome, bowel obstructions, stroke, diabetes, thyroid disease, Parkinson disease, hemorrhoids, and colon cancer  Complications  Potential complications of constipation can include:    Hemorrhoids    Rectal bleeding from hemorrhoids or anal fissures (skin tears)    Hernias    Dependency on laxatives    Chronic constipation    Fecal impaction    Bowel obstruction or perforation  Home care  All treatment should be done after talking with your healthcare provider. This is especially true if you have another medical problems, are taking prescription medicines, or are an older adult. Treatment most often involves lifestyle changes. You may also need medicines. Your healthcare provider will tell you which will work best for you. Follow the advice below to help avoid this problem in the future.  Lifestyle changes  These lifestyle changes can help prevent constipation:    Diet. Eat a high-fiber diet, with fresh fruit and vegetables, and reduce dairy intake, meats, and processed foods    Fluids. It's important to get enough fluids each day. Drink plenty of water when you eat more fiber. If you are on diet that limits the amount of fluid you can have, talk about this with your healthcare provider.    Regular exercise. Check with your healthcare provider first.  Medicines  Take any medicines as directed. Some laxatives are safe to use only every now and then. Others can be taken on a regular basis. Talk with your doctor or pharmacist if you have questions.  Prescription pain medicines can cause constipation. If you are taking this kind of medicine, ask your healthcare provider if you should also  take a stool softener.  Medicines you may take to treat constipation include:    Fiber supplements    Stool softeners    Laxatives    Enemas    Rectal suppositories  Follow-up care  Follow up with your healthcare provider if symptoms don't get better in the next few days. You may need to have more tests or see a specialist.  Call 911  Call 911 if any of these occur:    Trouble breathing    Stiff, rigid abdomen that is severely painful to touch    Confusion    Fainting or loss of consciousness    Rapid heart rate    Chest pain  When to seek medical advice  Call your healthcare provider right away if any of these occur:    Fever of 100.4 F (38 C) or higher, or as directed by your healthcare provider    Failure to resume normal bowel movements    Pain in your abdomen or back gets worse    Nausea or vomiting    Swelling in your abdomen    Blood in the stool    Black, tarry stool    Involuntary weight loss    Weakness  Date Last Reviewed: 12/30/2015 2000-2017 [employer]. [address] [place] [address], [place] [address]. All rights reserved. This information is not intended as a substitute for professional medical care. Always follow your healthcare professional's instructions.

## 2018-07-24 NOTE — MR AVS SNAPSHOT
After Visit Summary   7/24/2018    Doreen Gramajo    MRN: [id number]           Patient Information     Date Of Birth          1970        Visit Information        Provider Department      7/24/2018 10:00 AM [place] RALF EDMONDS/LPN [facility]        Today's Diagnoses     Bipolar affective disorder, remission status unspecified (H)        Schizophrenia, unspecified type (H)        Paranoid schizophrenia, subchronic condition with acute exacerbation (H)           Follow-ups after your visit        Your next 10 appointments already scheduled     Aug 13, 2018  4:15 PM CDT   MA SCREENING DIGITAL BILATERAL with PIMA1   [facility]    [address] [address]   [phone]           Do not use any powder, lotion or deodorant under your arms or on your breast. If you do, we will ask you to remove it before your exam.  Wear comfortable, two-piece clothing.  If you have any allergies, tell your care team.  Bring any previous mammograms from other facilities or have them mailed to the breast center.            Aug 14, 2018  9:15 AM CDT   LAB with PI LAB   [facility]    [address] [id number] [phone]           Please do not eat 10-12 hours before your appointment if you are coming in fasting for labs on lipids, cholesterol, or glucose (sugar). This does not apply to pregnant women. Water, hot tea and black coffee (with nothing added) are okay. Do not drink other fluids, diet soda or chew gum.            Aug 14, 2018  9:30 AM CDT   Nurse Only with FL PI RN   [facility]    [address] [address]   [phone]            Sep 04, 2018  9:15 AM CDT   LAB with PI LAB   [facility]    [address] [address]   [phone]           Please  do not eat 10-12 hours before your appointment if you are coming in fasting for labs on lipids, cholesterol, or glucose (sugar). This does not apply to pregnant women. Water, hot tea and black coffee (with nothing added) are okay. Do not drink other fluids, diet soda or chew gum.            Sep 04, 2018  9:30 AM CDT   Nurse Only with [place] RALF RN   [facility]    [address] [address] [phone]            Sep 25, 2018  9:30 AM CDT   Nurse Only with [place] RALF RN   [facility]    [address] [address] [phone]              Who to contact     If you have questions or need follow up information about today's clinic visit or your schedule please contact [facility] directly at [phone].  Normal or non-critical lab and imaging results will be communicated to you by MyChart, letter or phone within 4 business days after the clinic has received the results. If you do not hear from us within 7 days, please contact the clinic through MyChart or phone. If you have a critical or abnormal lab result, we will notify you by phone as soon as possible.  Submit refill requests through [email or web address] or call your pharmacy and they will forward the refill request to us. Please allow 3 business days for your refill to be completed.          Additional Information About Your Visit        Care EveryWhere ID     This is your Care EveryWhere ID. This could be used by other organizations to access your [facility] medical records  [id number]         Blood Pressure from Last 3 Encounters:   07/24/18 122/66   06/20/18 106/82   05/17/18 116/66    Weight from Last 3 Encounters:   07/24/18 141 lb (64 kg)   06/20/18 142 lb (64.4 kg)   05/17/18 139 lb (63 kg)              We Performed the Following     HALOPERIDOL DECANOATE INJ     INJECTION INTRAMUSCULAR OR SUB-Q          Today's Medication Changes           These changes are accurate as of 7/24/18 10:48 AM.  If you have any questions, ask your nurse or doctor.               These medicines have changed or have updated prescriptions.        Dose/Directions    * nicotine polacrilex 2 MG gum   Commonly known as:  NICORETTE STARTER KIT   This may have changed:  Another medication with the same name was added. Make sure you understand how and when to take each.   Used for:  Cigarette nicotine dependence with nicotine-induced disorder   Changed by:  Josey Bonilla CNP        Dose:  2 mg   Place 1 each (2 mg) inside cheek as needed for smoking cessation As needed every 1-2 hours   Quantity:  100 tablet   Refills:  0       * nicotine polacrilex 2 MG gum   Commonly known as:  NICORETTE STARTER KIT   This may have changed:  You were already taking a medication with the same name, and this prescription was added. Make sure you understand how and when to take each.   Used for:  Cigarette nicotine dependence with nicotine-induced disorder   Changed by:  Josey Bonilla CNP        Dose:  2 mg   Place 1 each (2 mg) inside cheek as needed for smoking cessation   Quantity:  90 tablet   Refills:  0       * Notice:  This list has 2 medication(s) that are the same as other medications prescribed for you. Read the directions carefully, and ask your doctor or other care provider to review them with you.         Where to get your medicines      These medications were sent to [address] [address]    Hours:  test rx sent successfully  2/8/05  kr Phone:  [phone]     nicotine polacrilex 2 MG gum                Primary Care Provider Office Phone # Fax #    Josey Bonilla [phone]       [address] [place] [id number]        Equal Access to Services     [facility] IMCHELLE AH: all Villavicencio, ignacio moreno  alise mathewsethel callawayaan ah. So [facility] [phone].    ATENCIÓN: Si long serna, tiene a mariano disposición servicios gratuitos de asistencia lingüística. Uyen al [phone].    We comply with applicable federal civil rights laws and [place] laws. We do not discriminate on the basis of race, color, national origin, age, disability, sex, sexual orientation, or gender identity.            Thank you!     Thank you for choosing [facility]  for your care. Our goal is always to provide you with excellent care. Hearing back from our patients is one way we can continue to improve our services. Please take a few minutes to complete the written survey that you may receive in the mail after your visit with us. Thank you!             Your Updated Medication List - Protect others around you: Learn how to safely use, store and throw away your medicines at www.disposemymeds.org.          This list is accurate as of 7/24/18 10:48 AM.  Always use your most recent med list.                   Brand Name Dispense Instructions for use Diagnosis    albuterol 108 (90 Base) MCG/ACT Inhaler    VENTOLIN HFA    18 g    Inhale 2 puffs into the lungs every 6 hours (PRN for shortness of breath)    Asthma, intermittent, uncomplicated       atropine 0.1 mg/mL Susp suspension      Take by mouth At Bedtime        B Complex-C Tabs     30 tablet    Take 1 tablet by mouth daily    Iron deficiency       CERTAVITE/ANTIOXIDANTS Tabs tablet   Generic drug:  multivitamin, therapeutic with minerals     30 tablet    TAKE ONE TABLET BY MOUTH EVERY DAY    Schizoaffective disorder, unspecified type (H)       Clozapine 200 MG tablet    CLOZARIL     150 mg at breakfast and 650 mg after dinner        DEPEND UNDERGARMENTS Misc     31 each    1 each daily as needed MEDIUM sized briefs    Urinary incontinence, unspecified type       desvenlafaxine succinate 50 MG 24 hr tablet    PRISTIQ     Take 50 mg by mouth         DIPHENDRYL PO      Take 50 mg by mouth 3 times daily as needed        Ferrous Sulfate 324 (65 Fe) MG Tbec     60 tablet    TAKE ONE TABLET BY MOUTH TWICE DAILY    Iron deficiency       haloperidol decanoate 100 MG/ML injection    HALDOL DECANOATE    1 mL    Inject 100 mg into the muscle every 21 days Order scanned into [facility], [facility] ph:[phone] Received on 12/05/2017. Refills 12.    Schizophrenia, unspecified type (H), Bipolar affective disorder, remission status unspecified (H)       * HALOPERIDOL PO      Take 5 mg by mouth every 3 hours as needed for agitation (up to three daily)        * HALOPERIDOL PO      Take 10 mg by mouth At Bedtime        levothyroxine 50 MCG tablet    SYNTHROID/LEVOTHROID    30 tablet    Take 1 tablet (50 mcg) by mouth daily    Hypothyroidism, unspecified type       lithium 300 MG tablet      Take 300 mg by mouth 2 times daily        LORAZEPAM PO      Take 1 mg by mouth every 3 hours as needed for anxiety        * nicotine polacrilex 2 MG gum    NICORETTE STARTER KIT    100 tablet    Place 1 each (2 mg) inside cheek as needed for smoking cessation As needed every 1-2 hours    Cigarette nicotine dependence with nicotine-induced disorder       * nicotine polacrilex 2 MG gum    NICORETTE STARTER KIT    90 tablet    Place 1 each (2 mg) inside cheek as needed for smoking cessation    Cigarette nicotine dependence with nicotine-induced disorder       * order for DME     1 each    INCONTINENT PRODUCTS (UP / MONTH)    Chronic constipation, Schizophrenia, unspecified type (H), Schizoaffective disorder, unspecified type (H), Disturbance of conduct, Extrapyramidal symptom, Urinary incontinence, unspecified type, Bipolar affective disorder, remission status unspecified (H)       * order for DME     1 each    GLOVES    Chronic constipation, Schizophrenia, unspecified type (H), Schizoaffective disorder, unspecified type (H), Disturbance of  conduct, Extrapyramidal symptom, Urinary incontinence, unspecified type, Bipolar affective disorder, remission status unspecified (H)       * order for DME     1 each    INCONTINENT PERSONAL WIPES    Chronic constipation, Schizophrenia, unspecified type (H), Schizoaffective disorder, unspecified type (H), Disturbance of conduct, Extrapyramidal symptom, Urinary incontinence, unspecified type, Bipolar affective disorder, remission status unspecified (H)       * order for DME     1 each    CHUX PADS    Chronic constipation, Schizophrenia, unspecified type (H), Schizoaffective disorder, unspecified type (H), Disturbance of conduct, Extrapyramidal symptom, Urinary incontinence, unspecified type, Bipolar affective disorder, remission status unspecified (H)       pantoprazole 20 MG EC tablet    PROTONIX    30 tablet    TAKE ONE TABLET BY MOUTH EVERY DAY    Gastroesophageal reflux disease without esophagitis       polyethylene glycol powder    MIRALAX/GLYCOLAX    527 g    Take 17 g (1 capful) by mouth daily    Chronic constipation       sennosides 8.6 MG tablet    SENOKOT    200 tablet    TAKE THREE TABLETS BY MOUTH TWICE DAILY    Chronic constipation       SF 5000 PLUS 1.1 % Crea   Generic drug:  Sodium Fluoride      Apply to affected area At Bedtime        simethicone 80 MG chewable tablet    MYLICON    180 tablet    Take 1 tablet (80 mg) by mouth every 6 hours as needed for flatulence or cramping PRN    Flatulence, eructation and gas pain       TRAZODONE HCL PO      Take 200 mg by mouth At Bedtime        * Notice:  This list has 8 medication(s) that are the same as other medications prescribed for you. Read the directions carefully, and ask your doctor or other care provider to review them with you.

## 2018-07-24 NOTE — LETTER
My Asthma Action Plan  Name: Doreen Gramajo   YOB: 1970  Date: 7/24/2018   My doctor: Josey Bonilla CNP   My clinic: [facility]        My Control Medicine: None  My Rescue Medicine: Albuterol (Proair/Ventolin/Proventil) inhaler 2 puffs as every 6 hours needed   My Asthma Severity: intermittent  Avoid your asthma triggers: humidity  None            GREEN ZONE   Good Control    I feel good    No cough or wheeze    Can work, sleep and play without asthma symptoms       Take your asthma control medicine every day.     1. If exercise triggers your asthma, take your rescue medication    15 minutes before exercise or sports, and    During exercise if you have asthma symptoms  2. Spacer to use with inhaler: If you have a spacer, make sure to use it with your inhaler             YELLOW ZONE Getting Worse  I have ANY of these:    I do not feel good    Cough or wheeze    Chest feels tight    Wake up at night   1. Keep taking your Green Zone medications  2. Start taking your rescue medicine:    every 20 minutes for up to 1 hour. Then every 4 hours for 24-48 hours.  3. If you stay in the Yellow Zone for more than 12-24 hours, contact your doctor.  4. If you do not return to the Green Zone in 12-24 hours or you get worse, start taking your oral steroid medicine if prescribed by your provider.           RED ZONE Medical Alert - Get Help  I have ANY of these:    I feel awful    Medicine is not helping    Breathing getting harder    Trouble walking or talking    Nose opens wide to breathe       1. Take your rescue medicine NOW  2. If your provider has prescribed an oral steroid medicine, start taking it NOW  3. Call your doctor NOW  4. If you are still in the Red Zone after 20 minutes and you have not reached your doctor:    Take your rescue medicine again and    Call 911 or go to the emergency room right away    See your regular doctor within 2 weeks of an Emergency Room or Urgent Care visit  for follow-up treatment.          Annual Reminders:  Meet with Asthma Educator,  Flu Shot in the Fall, consider Pneumonia Vaccination for patients with asthma (aged 19 and older).    Pharmacy: [facility] [place] [facility] [place] [facility] [address]                      Asthma Triggers  How To Control Things That Make Your Asthma Worse    Triggers are things that make your asthma worse.  Look at the list below to help you find your triggers and what you can do about them.  You can help prevent asthma flare-ups by staying away from your triggers.      Trigger                                                          What you can do   Cigarette Smoke  Tobacco smoke can make asthma worse. Do not allow smoking in your home, car or around you.  Be sure no one smokes at a child s day care or school.  If you smoke, ask your health care provider for ways to help you quit.  Ask family members to quit too.  Ask your health care provider for a referral to Quit Plan to help you quit smoking, or call 2-247-434-PLAN.     Colds, Flu, Bronchitis  These are common triggers of asthma. Wash your hands often.  Don t touch your eyes, nose or mouth.  Get a flu shot every year.     Dust Mites  These are tiny bugs that live in cloth or carpet. They are too small to see. Wash sheets and blankets in hot water every week.   Encase pillows and mattress in dust mite proof covers.  Avoid having carpet if you can. If you have carpet, vacuum weekly.   Use a dust mask and HEPA vacuum.   Pollen and Outdoor Mold  Some people are allergic to trees, grass, or weed pollen, or molds. Try to keep your windows closed.  Limit time out doors when pollen count is high.   Ask you health care provider about taking medicine during allergy season.     Animal Dander  Some people are allergic to skin flakes, urine or saliva from pets with fur or feathers. Keep pets with fur or feathers out of your home.    If you can t keep the pet outdoors, then  keep the pet out of your bedroom.  Keep the bedroom door closed.  Keep pets off cloth furniture and away from stuffed toys.     Mice, Rats, and Cockroaches  Some people are allergic to the waste from these pests.   Cover food and garbage.  Clean up spills and food crumbs.  Store grease in the refrigerator.   Keep food out of the bedroom.   Indoor Mold  This can be a trigger if your home has high moisture. Fix leaking faucets, pipes, or other sources of water.   Clean moldy surfaces.  Dehumidify basement if it is damp and smelly.   Smoke, Strong Odors, and Sprays  These can reduce air quality. Stay away from strong odors and sprays, such as perfume, powder, hair spray, paints, smoke incense, paint, cleaning products, candles and new carpet.   Exercise or Sports  Some people with asthma have this trigger. Be active!  Ask your doctor about taking medicine before sports or exercise to prevent symptoms.    Warm up for 5-10 minutes before and after sports or exercise.     Other Triggers of Asthma  Cold air:  Cover your nose and mouth with a scarf.  Sometimes laughing or crying can be a trigger.  Some medicines and food can trigger asthma.

## 2018-07-24 NOTE — MR AVS SNAPSHOT
After Visit Summary   7/24/2018    Doreen Gramajo    MRN: [id number]           Patient Information     Date Of Birth          1970        Visit Information        Provider Department      7/24/2018 9:40 AM Josey Bonilla, CNP [facility]        Today's Diagnoses     Lives in group home    -  1    Schizophrenia, unspecified type (H)        Bipolar affective disorder, remission status unspecified (H)        Hypothyroidism, unspecified type        Urinary incontinence, unspecified type        Chronic constipation        Cigarette nicotine dependence with nicotine-induced disorder        Acute bronchospasm        Gastroesophageal reflux disease without esophagitis        CARDIOVASCULAR SCREENING; LDL GOAL LESS THAN 160        Dry eyes          Care Instructions    1. Lives in group home/Group Home Physical  [place]    2. Schizophrenia, unspecified type (H)  Chronic, stable  [facility]    3. Bipolar affective disorder, remission status unspecified (H)  Chronic, stable  [facility] with [facility]    4. Hypothyroidism, unspecified type  Chronic, stable  - TSH with free T4 reflex    5. Urinary incontinence, unspecified type  Chronic, stable  - UROLOGY ADULT REFERRAL    6. Chronic constipation  Chronic, stable  Follows with Dr. Briceño  Push fluids    7. Cigarette nicotine dependence with nicotine-induced disorder  Chronic, stable  Nicorette gum refilled today  - nicotine polacrilex (NICORETTE STARTER KIT) 2 MG gum; Place 1 each (2 mg) inside cheek as needed for smoking cessation  Dispense: 90 tablet; Refill: 0    8. Acute bronchospasm  Chronic, stable    9. Gastroesophageal reflux disease without esophagitis  Chronic, stable    10. CARDIOVASCULAR SCREENING; LDL GOAL LESS THAN 160  Screening  - Lipid panel reflex to direct LDL Fasting    11. Dry eyes  Chronic, stable      Constipation (Adult)  Constipation means that you have  bowel movements that are less frequent than usual. Stools often become very hard and difficult to pass.  Constipation is very common. At some point in life it affects almost everyone. Since everyone's bowel habits are different, what is constipation to one person may not be to another. Your healthcare provider may do tests to diagnose constipation. It depends on what he or she finds when evaluating you.    Symptoms of constipation include:    Abdominal pain    Bloating    Vomiting    Painful bowel movements    Itching, swelling, bleeding, or pain around the anus  Causes  Constipation can have many causes. These include:    Diet low in fiber    Too much dairy    Not drinking enough liquids    Lack of exercise or physical activity. This is especially true for older adults.    Changes in lifestyle or daily routine, including pregnancy, aging, work, and travel    Frequent use or misuse of laxatives    Ignoring the urge to have a bowel movement or delaying it until later    Medicines, such as certain prescription pain medicines, iron supplements, antacids, certain antidepressants, and calcium supplements    Diseases like irritable bowel syndrome, bowel obstructions, stroke, diabetes, thyroid disease, Parkinson disease, hemorrhoids, and colon cancer  Complications  Potential complications of constipation can include:    Hemorrhoids    Rectal bleeding from hemorrhoids or anal fissures (skin tears)    Hernias    Dependency on laxatives    Chronic constipation    Fecal impaction    Bowel obstruction or perforation  Home care  All treatment should be done after talking with your healthcare provider. This is especially true if you have another medical problems, are taking prescription medicines, or are an older adult. Treatment most often involves lifestyle changes. You may also need medicines. Your healthcare provider will tell you which will work best for you. Follow the advice below to help avoid this problem in the  future.  Lifestyle changes  These lifestyle changes can help prevent constipation:    Diet. Eat a high-fiber diet, with fresh fruit and vegetables, and reduce dairy intake, meats, and processed foods    Fluids. It's important to get enough fluids each day. Drink plenty of water when you eat more fiber. If you are on diet that limits the amount of fluid you can have, talk about this with your healthcare provider.    Regular exercise. Check with your healthcare provider first.  Medicines  Take any medicines as directed. Some laxatives are safe to use only every now and then. Others can be taken on a regular basis. Talk with your doctor or pharmacist if you have questions.  Prescription pain medicines can cause constipation. If you are taking this kind of medicine, ask your healthcare provider if you should also take a stool softener.  Medicines you may take to treat constipation include:    Fiber supplements    Stool softeners    Laxatives    Enemas    Rectal suppositories  Follow-up care  Follow up with your healthcare provider if symptoms don't get better in the next few days. You may need to have more tests or see a specialist.  Call 911  Call 911 if any of these occur:    Trouble breathing    Stiff, rigid abdomen that is severely painful to touch    Confusion    Fainting or loss of consciousness    Rapid heart rate    Chest pain  When to seek medical advice  Call your healthcare provider right away if any of these occur:    Fever of 100.4 F (38 C) or higher, or as directed by your healthcare provider    Failure to resume normal bowel movements    Pain in your abdomen or back gets worse    Nausea or vomiting    Swelling in your abdomen    Blood in the stool    Black, tarry stool    Involuntary weight loss    Weakness  Date Last Reviewed: 12/30/2015 2000-2017 [employer]. [address] [place] [address], [place] [address]. All rights reserved. This information is not intended as a substitute for professional  medical care. Always follow your healthcare professional's instructions.                Follow-ups after your visit        Additional Services     UROLOGY ADULT REFERRAL       Your provider has referred you to: [facility]: [facility] [phone]   [email or web address]    Please be aware that coverage of these services is subject to the terms and limitations of your health insurance plan.  Call member services at your health plan with any benefit or coverage questions.      Please bring the following with you to your appointment:    (1) Any X-Rays, CTs or MRIs which have been performed.  Contact the facility where they were done to arrange for  prior to your scheduled appointment.    (2) List of current medications  (3) This referral request   (4) Any documents/labs given to you for this referral                  Your next 10 appointments already scheduled     Aug 13, 2018  4:15 PM CDT   [facility] SCREENING DIGITAL BIL[facility]AL [facility]    [address] [facility] [address]   [phone]           Do not use any powder, lotion or deodorant under your arms or on your breast. If you do, we will ask you to remove it before your exam.  Wear comfortable, two-piece clothing.  If you have any allergies, tell your care team.  Bring any previous mammograms from other facilities or have them mailed to the breast center.            Aug 14, 2018  9:15 AM CDT   [facility] with PI LAB   [facility]    [address] [facility] [address]   [phone]           Please do not eat 10-12 hours before your appointment if you are coming in fasting for labs on lipids, cholesterol, or glucose (sugar). This does not apply to pregnant women. Water, hot tea and black coffee (with nothing added) are okay. Do not drink other fluids,  diet soda or chew gum.            Aug 14, 2018  9:30 AM CDT   [facility] with [place] PI RN   [facility]    [address] [id number] [phone]            Sep 04, 2018  9:15 AM CDT   LAB with PI LAB   [facility]    [address] [id number] [phone]           Please do not eat 10-12 hours before your appointment if you are coming in fasting for labs on lipids, cholesterol, or glucose (sugar). This does not apply to pregnant women. Water, hot tea and black coffee (with nothing added) are okay. Do not drink other fluids, diet soda or chew gum.            Sep 04, 2018  9:30 AM CDT   Nurse Only with [place] PI RN   [facility]    [address] [facility] [address] [phone]            Sep 25, 2018  9:30 AM CDT   Nurse Only with FL PI RN   [facility]    [address] [facility] [address] [phone]              Who to contact     If you have questions or need follow up information about today's clinic visit or your schedule please contact [facility] directly at [phone].  Normal or non-critical lab and imaging results will be communicated to you by MyChart, letter or phone within 4 business days after the clinic has received the results. If you do not hear from us within 7 days, please contact the clinic through MyChart or phone. If you have a critical or abnormal lab result, we will notify you by phone as soon as possible.  Submit refill requests through [employer] (P) or call your pharmacy and they will forward the refill request to us. Please allow 3 business days for your refill to be completed.          Additional Information About Your Visit        Care EveryWhere ID     This is your Care EveryWhere ID. This could be used by other organizations to access  "your [facility] medical records  [id number]        Your Vitals Were     Pulse Temperature Respirations Height BMI (Body Mass Index)       104 97.8  F (36.6  C) (Tympanic) 20 5' 2\" (1.575 m) 25.79 kg/m2        Blood Pressure from Last 3 Encounters:   07/24/18 122/66   06/20/18 106/82   05/17/18 116/66    Weight from Last 3 Encounters:   07/24/18 141 lb (64 kg)   06/20/18 142 lb (64.4 kg)   05/17/18 139 lb (63 kg)              We Performed the Following     Asthma Action Plan (AAP)     Lipid panel reflex to direct LDL Fasting     TSH with free T4 reflex     UROLOGY ADULT REFERRAL          Today's Medication Changes          These changes are accurate as of 7/24/18 10:34 AM.  If you have any questions, ask your nurse or doctor.               These medicines have changed or have updated prescriptions.        Dose/Directions    * nicotine polacrilex 2 MG gum   Commonly known as:  NICORETTE STARTER KIT   This may have changed:  Another medication with the same name was added. Make sure you understand how and when to take each.   Used for:  Cigarette nicotine dependence with nicotine-induced disorder   Changed by:  Josey Bonilla CNP        Dose:  2 mg   Place 1 each (2 mg) inside cheek as needed for smoking cessation As needed every 1-2 hours   Quantity:  100 tablet   Refills:  0       * nicotine polacrilex 2 MG gum   Commonly known as:  NICORETTE STARTER KIT   This may have changed:  You were already taking a medication with the same name, and this prescription was added. Make sure you understand how and when to take each.   Used for:  Cigarette nicotine dependence with nicotine-induced disorder   Changed by:  Josey Bonilla CNP        Dose:  2 mg   Place 1 each (2 mg) inside cheek as needed for smoking cessation   Quantity:  90 tablet   Refills:  0       * Notice:  This list has 2 medication(s) that are the same as other medications prescribed for you. Read the directions carefully, and ask your doctor " or other care provider to review them with you.         Where to get your medicines      These medications were sent to [facility] - [place], [facility], [place] - [address]  [address], [facility] [address]    Hours:  test rx sent successfully  2/8/05  kr Phone:  [phone]     nicotine polacrilex 2 MG gum                Primary Care Provider Office Phone # Fax #    Josey Bonilla, [facility] [phone]       [address] [address]        Equal Access to Services     [facility]: Hadii aad ku hadasho Soomaali, waaxda luqadaha, qaybta kaalmada adeegyada, waxay teresain bernie gutierrez . So [facility] [phone].    ATENCIÓN: Si habla español, tiene a mariano disposición servicios gratuitos de asistencia lingüística. Uyen al [phone].    We comply with applicable federal civil rights laws and [place] laws. We do not discriminate on the basis of race, color, national origin, age, disability, sex, sexual orientation, or gender identity.            Thank you!     Thank you for choosing [facility]  for your care. Our goal is always to provide you with excellent care. Hearing back from our patients is one way we can continue to improve our services. Please take a few minutes to complete the written survey that you may receive in the mail after your visit with us. Thank you!             Your Updated Medication List - Protect others around you: Learn how to safely use, store and throw away your medicines at www.disposemymeds.org.          This list is accurate as of 7/24/18 10:34 AM.  Always use your most recent med list.                   Brand Name Dispense Instructions for use Diagnosis    albuterol 108 (90 Base) MCG/ACT Inhaler    VENTOLIN HFA    18 g    Inhale 2 puffs into the lungs every 6 hours (PRN for shortness of breath)    Asthma, intermittent, uncomplicated       atropine 0.1 mg/mL Susp suspension      Take by mouth At  Bedtime        B Complex-C Tabs     30 tablet    Take 1 tablet by mouth daily    Iron deficiency       CERTAVITE/ANTIOXIDANTS Tabs tablet   Generic drug:  multivitamin, therapeutic with minerals     30 tablet    TAKE ONE TABLET BY MOUTH EVERY DAY    Schizoaffective disorder, unspecified type (H)       Clozapine 200 MG tablet    CLOZARIL     150 mg at breakfast and 650 mg after dinner        DEPEND UNDERGARMENTS Misc     31 each    1 each daily as needed MEDIUM sized briefs    Urinary incontinence, unspecified type       desvenlafaxine succinate 50 MG 24 hr tablet    PRISTIQ     Take 50 mg by mouth        DIPHENDRYL PO      Take 50 mg by mouth 3 times daily as needed        Ferrous Sulfate 324 (65 Fe) MG Tbec     60 tablet    TAKE ONE TABLET BY MOUTH TWICE DAILY    Iron deficiency       haloperidol decanoate 100 MG/ML injection    HALDOL DECANOATE    1 mL    Inject 100 mg into the muscle every 21 days Order scanned into [employer], Order from [facility] ([facility]) ph:[phone] Received on 12/05/2017. Refills 12.    Schizophrenia, unspecified type (H), Bipolar affective disorder, remission status unspecified (H)       * HALOPERIDOL PO      Take 5 mg by mouth every 3 hours as needed for agitation (up to three daily)        * HALOPERIDOL PO      Take 10 mg by mouth At Bedtime        levothyroxine 50 MCG tablet    SYNTHROID/LEVOTHROID    30 tablet    Take 1 tablet (50 mcg) by mouth daily    Hypothyroidism, unspecified type       lithium 300 MG tablet      Take 300 mg by mouth 2 times daily        LORAZEPAM PO      Take 1 mg by mouth every 3 hours as needed for anxiety        * nicotine polacrilex 2 MG gum    NICORETTE STARTER KIT    100 tablet    Place 1 each (2 mg) inside cheek as needed for smoking cessation As needed every 1-2 hours    Cigarette nicotine dependence with nicotine-induced disorder       * nicotine polacrilex 2 MG gum    NICORETTE STARTER KIT    90 tablet    Place 1 each (2 mg)  inside cheek as needed for smoking cessation    Cigarette nicotine dependence with nicotine-induced disorder       * order for DME     1 each    INCONTINENT PRODUCTS (UP / MONTH)    Chronic constipation, Schizophrenia, unspecified type (H), Schizoaffective disorder, unspecified type (H), Disturbance of conduct, Extrapyramidal symptom, Urinary incontinence, unspecified type, Bipolar affective disorder, remission status unspecified (H)       * order for DME     1 each    GLOVES    Chronic constipation, Schizophrenia, unspecified type (H), Schizoaffective disorder, unspecified type (H), Disturbance of conduct, Extrapyramidal symptom, Urinary incontinence, unspecified type, Bipolar affective disorder, remission status unspecified (H)       * order for DME     1 each    INCONTINENT PERSONAL WIPES    Chronic constipation, Schizophrenia, unspecified type (H), Schizoaffective disorder, unspecified type (H), Disturbance of conduct, Extrapyramidal symptom, Urinary incontinence, unspecified type, Bipolar affective disorder, remission status unspecified (H)       * order for DME     1 each    CHUX PADS    Chronic constipation, Schizophrenia, unspecified type (H), Schizoaffective disorder, unspecified type (H), Disturbance of conduct, Extrapyramidal symptom, Urinary incontinence, unspecified type, Bipolar affective disorder, remission status unspecified (H)       pantoprazole 20 MG EC tablet    PROTONIX    30 tablet    TAKE ONE TABLET BY MOUTH EVERY DAY    Gastroesophageal reflux disease without esophagitis       polyethylene glycol powder    MIRALAX/GLYCOLAX    527 g    Take 17 g (1 capful) by mouth daily    Chronic constipation       sennosides 8.6 MG tablet    SENOKOT    200 tablet    TAKE THREE TABLETS BY MOUTH TWICE DAILY    Chronic constipation       SF 5000 PLUS 1.1 % Crea   Generic drug:  Sodium Fluoride      Apply to affected area At Bedtime        simethicone 80 MG chewable tablet    MYLICON    180 tablet    Take  1 tablet (80 mg) by mouth every 6 hours as needed for flatulence or cramping PRN    Flatulence, eructation and gas pain       TRAZODONE HCL PO      Take 200 mg by mouth At Bedtime        * Notice:  This list has 8 medication(s) that are the same as other medications prescribed for you. Read the directions carefully, and ask your doctor or other care provider to review them with you.

## 2018-07-24 NOTE — PROGRESS NOTES
SUBJECTIVE:   Doreen Gramajo is a 47 year old female who presents to clinic today for the following health issues:      GROUP HOME PHYSICAL    PSYCH: follows with Jody Schoenecker, NP    Worker notes she doesn't like to stay hydrated during the day because of her urinary incontinence. She has not followed with Urology re: incontinence. Referral placed.     Hypothyrodism  TSH   Date Value Ref Range Status   10/10/2017 3.02 0.40 - 4.00 mU/L Final   04/25/2017 3.94 0.40 - 4.00 mU/L Final   05/10/2016 2.54 0.40 - 4.00 mU/L Final   03/10/2015 3.04 0.40 - 4.00 mU/L Final     Comment:     Effective 7/30/2014, the reference range for this assay has changed to reflect   new instrumentation/methodology.     02/04/2014 2.46 0.4 - 5.0 mU/L Final       Bronchospasm with smoking  PFT 1/16/2017  The FVC, FEV1, FEV1/FVC ratio and QER37-21% are within normal limits.  The inspiratory flow rates are normal. Lung volumes are within normal limits.  Following administration of bronchodilators, there is no significant response.  The results are within normal limits.  IMPRESSION:  Normal Pulmonary Function  Unable to complete DLCO    HPI:   PCP:  Josey Bonilla, [facility] [phone]    Patient Active Problem List   Diagnosis     Schizophrenia (H)     Hypothyroidism     Bipolar affective (H)     Gastroesophageal reflux disease without esophagitis     CARDIOVASCULAR SCREENING; LDL GOAL LESS THAN 160     Health Care Home     Psychosis     Behavior disturbance     Cigarette nicotine dependence with nicotine-induced disorder     Iron deficiency     Chronic constipation     Urinary incontinence, unspecified type     Borderline intellectual functioning     History of anorexia nervosa     Rectal prolapse     Extrapyramidal symptom     History of eating disorder     Disturbance of conduct     Paranoid schizophrenia, subchronic condition with acute exacerbation (H)     Lives in group home     Acute bronchospasm     Dry eyes     Current  "Outpatient Prescriptions   Medication     albuterol (VENTOLIN HFA) 108 (90 Base) MCG/ACT Inhaler     atropine 0.1 mg/mL     B Complex-C TABS     CERTAVITE/ANTIOXIDANTS TABS tablet     cloZAPine (CLOZARIL) 200 MG tablet     desvenlafaxine succinate (PRISTIQ) 50 MG 24 hr tablet     DiphenhydrAMINE HCl (DIPHENDRYL PO)     Ferrous Sulfate 324 (65 FE) MG TBEC     haloperidol decanoate (HALDOL DECANOATE) 100 MG/ML injection     HALOPERIDOL PO     HALOPERIDOL PO     Incontinence Supply Disposable (DEPEND UNDERGARMENTS) MISC     levothyroxine (SYNTHROID/LEVOTHROID) 50 MCG tablet     lithium 300 MG tablet     LORAZEPAM PO     nicotine polacrilex (NICORETTE STARTER KIT) 2 MG gum     order for DME     order for DME     order for DME     order for DME     pantoprazole (PROTONIX) 20 MG EC tablet     polyethylene glycol (MIRALAX/GLYCOLAX) powder     sennosides (SENOKOT) 8.6 MG tablet     simethicone (MYLICON) 80 MG chewable tablet     Sodium Fluoride (SF 5000 PLUS) 1.1 % CREA     TRAZODONE HCL PO     nicotine polacrilex (NICORETTE STARTER KIT) 2 MG gum     No current facility-administered medications for this visit.        Health Maintenance Due   Topic Date Due     MEDICARE ANNUAL WELLNESS VISIT  07/18/2018       Reviewed and updated:  Tobacco  Allergies  Meds  Med Hx  Surg Hx  Fam Hx  Soc Hx     ROS:  Constitutional, neuro, ENT, endocrine, pulmonary, cardiac, gastrointestinal, genitourinary, musculoskeletal, integument and psychiatric systems are negative, except as otherwise noted.    PHYSICAL EXAM:   /66 (BP Location: Right arm, Patient Position: Sitting, Cuff Size: Adult Large)  Pulse 104  Temp 97.8  F (36.6  C) (Tympanic)  Resp 20  Ht 5' 2\" (1.575 m)  Wt 141 lb (64 kg)  BMI 25.79 kg/m2  Body mass index is 25.79 kg/(m^2).  GENERAL APPEARANCE: healthy, alert and no distress  EYES: Eyes grossly normal to inspection, PERRL and conjunctivae and sclerae normal  HENT: ear canals and TM's normal and nose and mouth " without ulcers or lesions  NECK: no adenopathy, no asymmetry, masses, or scars and thyroid normal to palpation  RESP: lungs clear to auscultation - no rales, rhonchi or wheezes  CV: regular rates and rhythm, normal S1 S2, no S3 or S4 and no murmur, click or rub  ABDOMEN: soft, nontender, without hepatosplenomegaly or masses and bowel sounds normal  MS: extremities normal- no gross deformities noted  SKIN: no suspicious lesions or rashes  NEURO: Normal strength and tone, mentation intact and speech normal  PSYCH: mentation appears normal and affect normal/bright    ASSESSMENT & PLAN:     1. Lives in group home/Group Home Physical  [place]    2. Schizophrenia, unspecified type (H)  Chronic, stable  [facility]    3. Bipolar affective disorder, remission status unspecified (H)  Chronic, stable  Follows with Jody Schoenecker [facility]    4. Hypothyroidism, unspecified type  Chronic, stable  - TSH with free T4 reflex    5. Urinary incontinence, unspecified type  Chronic, stable  - UROLOGY ADULT REFERRAL    6. Chronic constipation  Chronic, stable  Follows with Dr. Briceño  Push fluids    7. Cigarette nicotine dependence with nicotine-induced disorder  Chronic, stable  Nicorette gum refilled today  - nicotine polacrilex (NICORETTE STARTER KIT) 2 MG gum; Place 1 each (2 mg) inside cheek as needed for smoking cessation  Dispense: 90 tablet; Refill: 0    8. Acute bronchospasm  Chronic, stable    9. Gastroesophageal reflux disease without esophagitis  Chronic, stable    10. CARDIOVASCULAR SCREENING; LDL GOAL LESS THAN 160  Screening  - Lipid panel reflex to direct LDL Fasting    11. Dry eyes  Chronic, stable      Constipation (Adult)  Constipation means that you have bowel movements that are less frequent than usual. Stools often become very hard and difficult to pass.  Constipation is very common. At some point in life it affects almost everyone. Since everyone's bowel habits are  different, what is constipation to one person may not be to another. Your healthcare provider may do tests to diagnose constipation. It depends on what he or she finds when evaluating you.    Symptoms of constipation include:    Abdominal pain    Bloating    Vomiting    Painful bowel movements    Itching, swelling, bleeding, or pain around the anus  Causes  Constipation can have many causes. These include:    Diet low in fiber    Too much dairy    Not drinking enough liquids    Lack of exercise or physical activity. This is especially true for older adults.    Changes in lifestyle or daily routine, including pregnancy, aging, work, and travel    Frequent use or misuse of laxatives    Ignoring the urge to have a bowel movement or delaying it until later    Medicines, such as certain prescription pain medicines, iron supplements, antacids, certain antidepressants, and calcium supplements    Diseases like irritable bowel syndrome, bowel obstructions, stroke, diabetes, thyroid disease, Parkinson disease, hemorrhoids, and colon cancer  Complications  Potential complications of constipation can include:    Hemorrhoids    Rectal bleeding from hemorrhoids or anal fissures (skin tears)    Hernias    Dependency on laxatives    Chronic constipation    Fecal impaction    Bowel obstruction or perforation  Home care  All treatment should be done after talking with your healthcare provider. This is especially true if you have another medical problems, are taking prescription medicines, or are an older adult. Treatment most often involves lifestyle changes. You may also need medicines. Your healthcare provider will tell you which will work best for you. Follow the advice below to help avoid this problem in the future.  Lifestyle changes  These lifestyle changes can help prevent constipation:    Diet. Eat a high-fiber diet, with fresh fruit and vegetables, and reduce dairy intake, meats, and processed foods    Fluids. It's important  to get enough fluids each day. Drink plenty of water when you eat more fiber. If you are on diet that limits the amount of fluid you can have, talk about this with your healthcare provider.    Regular exercise. Check with your healthcare provider first.  Medicines  Take any medicines as directed. Some laxatives are safe to use only every now and then. Others can be taken on a regular basis. Talk with your doctor or pharmacist if you have questions.  Prescription pain medicines can cause constipation. If you are taking this kind of medicine, ask your healthcare provider if you should also take a stool softener.  Medicines you may take to treat constipation include:    Fiber supplements    Stool softeners    Laxatives    Enemas    Rectal suppositories  Follow-up care  Follow up with your healthcare provider if symptoms don't get better in the next few days. You may need to have more tests or see a specialist.  Call 911  Call 911 if any of these occur:    Trouble breathing    Stiff, rigid abdomen that is severely painful to touch    Confusion    Fainting or loss of consciousness    Rapid heart rate    Chest pain  When to seek medical advice  Call your healthcare provider right away if any of these occur:    Fever of 100.4 F (38 C) or higher, or as directed by your healthcare provider    Failure to resume normal bowel movements    Pain in your abdomen or back gets worse    Nausea or vomiting    Swelling in your abdomen    Blood in the stool    Black, tarry stool    Involuntary weight loss    Weakness  Date Last Reviewed: 12/30/2015 2000-2017 [employer]. [address] [place] [id number]. All rights reserved. This information is not intended as a substitute for professional medical care. Always follow your healthcare professional's instructions.          Risks, benefits, side effects and rationale for treatment plan fully discussed with the patient and understanding expressed.    Josey  Chad [facility]

## 2018-07-24 NOTE — NURSING NOTE
"Chief Complaint   Patient presents with     Physical     Annual ELIE see scanned copies        Initial /66 (BP Location: Right arm, Patient Position: Sitting, Cuff Size: Adult Large)  Pulse 104  Temp 97.8  F (36.6  C) (Tympanic)  Resp 20  Ht 5' 2\" (1.575 m)  Wt 141 lb (64 kg)  BMI 25.79 kg/m2 Estimated body mass index is 25.79 kg/(m^2) as calculated from the following:    Height as of this encounter: 5' 2\" (1.575 m).    Weight as of this encounter: 141 lb (64 kg).      Health Maintenance that is potentially due pending provider review:  Mammogram    Pt will schedule mammogram appt.    Is there anyone who you would like to be able to receive your results? No  If yes have patient fill out WOODY    "

## 2018-08-13 ENCOUNTER — RADIANT APPOINTMENT (OUTPATIENT)
Dept: MAMMOGRAPHY | Facility: CLINIC | Age: 48
End: 2018-08-13
Attending: NURSE PRACTITIONER
Payer: MEDICARE

## 2018-08-13 DIAGNOSIS — Z12.31 VISIT FOR SCREENING MAMMOGRAM: ICD-10-CM

## 2018-08-13 PROCEDURE — 77067 SCR MAMMO BI INCL CAD: CPT | Mod: TC

## 2018-08-14 ENCOUNTER — ALLIED HEALTH/NURSE VISIT (OUTPATIENT)
Dept: FAMILY MEDICINE | Facility: CLINIC | Age: 48
End: 2018-08-14
Payer: MEDICARE

## 2018-08-14 DIAGNOSIS — Z79.899 NEED FOR PROPHYLACTIC CHEMOTHERAPY: ICD-10-CM

## 2018-08-14 DIAGNOSIS — F20.0 PARANOID SCHIZOPHRENIA (H): ICD-10-CM

## 2018-08-14 DIAGNOSIS — F20.9 SCHIZOPHRENIA, UNSPECIFIED TYPE (H): Chronic | ICD-10-CM

## 2018-08-14 DIAGNOSIS — F31.9 BIPOLAR AFFECTIVE DISORDER, REMISSION STATUS UNSPECIFIED (H): Chronic | ICD-10-CM

## 2018-08-14 DIAGNOSIS — Z13.6 CARDIOVASCULAR SCREENING; LDL GOAL LESS THAN 160: ICD-10-CM

## 2018-08-14 DIAGNOSIS — F20.0 PARANOID SCHIZOPHRENIA, SUBCHRONIC CONDITION WITH ACUTE EXACERBATION (H): ICD-10-CM

## 2018-08-14 DIAGNOSIS — Z79.899 ENCOUNTER FOR LONG-TERM (CURRENT) USE OF MEDICATIONS: ICD-10-CM

## 2018-08-14 DIAGNOSIS — E03.9 HYPOTHYROIDISM: Chronic | ICD-10-CM

## 2018-08-14 LAB
BASOPHILS # BLD AUTO: 0 10E9/L (ref 0–0.2)
BASOPHILS NFR BLD AUTO: 0.3 %
CHOLEST SERPL-MCNC: 158 MG/DL
DIFFERENTIAL METHOD BLD: ABNORMAL
EOSINOPHIL # BLD AUTO: 0 10E9/L (ref 0–0.7)
EOSINOPHIL NFR BLD AUTO: 0 %
ERYTHROCYTE [DISTWIDTH] IN BLOOD BY AUTOMATED COUNT: 13.8 % (ref 10–15)
HCT VFR BLD AUTO: 36.4 % (ref 35–47)
HDLC SERPL-MCNC: 53 MG/DL
HGB BLD-MCNC: 11.9 G/DL (ref 11.7–15.7)
LDLC SERPL CALC-MCNC: 93 MG/DL
LYMPHOCYTES # BLD AUTO: 1.8 10E9/L (ref 0.8–5.3)
LYMPHOCYTES NFR BLD AUTO: 19.9 %
MCH RBC QN AUTO: 33.8 PG (ref 26.5–33)
MCHC RBC AUTO-ENTMCNC: 32.7 G/DL (ref 31.5–36.5)
MCV RBC AUTO: 103 FL (ref 78–100)
MONOCYTES # BLD AUTO: 0.6 10E9/L (ref 0–1.3)
MONOCYTES NFR BLD AUTO: 6.2 %
NEUTROPHILS # BLD AUTO: 6.6 10E9/L (ref 1.6–8.3)
NEUTROPHILS NFR BLD AUTO: 73.6 %
NONHDLC SERPL-MCNC: 105 MG/DL
PLATELET # BLD AUTO: 400 10E9/L (ref 150–450)
RBC # BLD AUTO: 3.52 10E12/L (ref 3.8–5.2)
TRIGL SERPL-MCNC: 60 MG/DL
TSH SERPL DL<=0.005 MIU/L-ACNC: 1.94 MU/L (ref 0.4–4)
WBC # BLD AUTO: 8.9 10E9/L (ref 4–11)

## 2018-08-14 PROCEDURE — 85025 COMPLETE CBC W/AUTO DIFF WBC: CPT | Performed by: NURSE PRACTITIONER

## 2018-08-14 PROCEDURE — 36415 COLL VENOUS BLD VENIPUNCTURE: CPT | Performed by: NURSE PRACTITIONER

## 2018-08-14 PROCEDURE — 96372 THER/PROPH/DIAG INJ SC/IM: CPT

## 2018-08-14 PROCEDURE — 99207 ZZC NO CHARGE NURSE ONLY: CPT

## 2018-08-14 PROCEDURE — 80061 LIPID PANEL: CPT | Performed by: NURSE PRACTITIONER

## 2018-08-14 PROCEDURE — 80159 DRUG ASSAY CLOZAPINE: CPT | Mod: 90 | Performed by: NURSE PRACTITIONER

## 2018-08-14 PROCEDURE — 84443 ASSAY THYROID STIM HORMONE: CPT | Performed by: NURSE PRACTITIONER

## 2018-08-14 NOTE — PROGRESS NOTES
TSH (thryoid) normal  Lipids normal  Please notify her of these results and send a hard copy if not on myChart.   Thanks. RENÉ Nowak

## 2018-08-14 NOTE — LETTER
August 15, 2018      Doreen Gramajo  [address] [facility] [address]        Dear ,    We are writing to inform you of your test results.    TSH (thryoid) normal  Lipids normal    Resulted Orders   Lipid panel reflex to direct LDL Fasting   Result Value Ref Range    Cholesterol 158 <200 mg/dL    Triglycerides 60 <150 mg/dL      Comment:      Fasting specimen    HDL Cholesterol 53 >49 mg/dL    LDL Cholesterol Calculated 93 <100 mg/dL      Comment:      Desirable:       <100 mg/dl    Non HDL Cholesterol 105 <130 mg/dL   **TSH with free T4 reflex FUTURE anytime   Result Value Ref Range    TSH 1.94 0.40 - 4.00 mU/L       If you have any questions or concerns, please call the clinic at the number listed above.       Sincerely,        RUBÉN Bonilla NP/niki

## 2018-08-14 NOTE — MR AVS SNAPSHOT
After Visit Summary   8/14/2018    Doreen Gramajo    MRN: [id number]           Patient Information     Date Of Birth          1970        Visit Information        Provider Department      8/14/2018 9:30 AM [place] PI CMA/LPN [facility]        Today's Diagnoses     Bipolar affective disorder, remission status unspecified (H)        Schizophrenia, unspecified type (H)        Paranoid schizophrenia, subchronic condition with acute exacerbation (H)           Follow-ups after your visit        Your next 10 appointments already scheduled     Aug 14, 2018  9:45 AM CDT   LAB with PI LAB   [facility]    [address] [facility] [address]   [phone]           Please do not eat 10-12 hours before your appointment if you are coming in fasting for labs on lipids, cholesterol, or glucose (sugar). This does not apply to pregnant women. Water, hot tea and black coffee (with nothing added) are okay. Do not drink other fluids, diet soda or chew gum.            Aug 28, 2018  1:45 PM CDT   [facility]    [address] [address]   [phone]            Sep 04, 2018  9:15 AM CDT   LAB with PI LAB   [facility]    [address] [address]   [phone]           Please do not eat 10-12 hours before your appointment if you are coming in fasting for labs on lipids, cholesterol, or glucose (sugar). This does not apply to pregnant women. Water, hot tea and black coffee (with nothing added) are okay. Do not drink other fluids, diet soda or chew gum.            Sep 04, 2018  9:30 AM CDT   Nurse Only with [place] PI CMA/LPN   [facility]    [address] [facility] [address]   [phone]            Sep 25, 2018  9:30 AM CDT   Nurse Only  with [place] PI RN   [facility]    [address] [facility] [address]   [phone]              Who to contact     If you have questions or need follow up information about today's clinic visit or your schedule please contact [facility] directly at [phone].  Normal or non-critical lab and imaging results will be communicated to you by MyChart, letter or phone within 4 business days after the clinic has received the results. If you do not hear from us within 7 days, please contact the clinic through MyChart or phone. If you have a critical or abnormal lab result, we will notify you by phone as soon as possible.  Submit refill requests through [employer] or call your pharmacy and they will forward the refill request to us. Please allow 3 business days for your refill to be completed.          Additional Information About Your Visit        Care EveryWhere ID     This is your Care EveryWhere ID. This could be used by other organizations to access your [place] medical records  [id number]         Blood Pressure from Last 3 Encounters:   07/24/18 122/66   06/20/18 106/82   05/17/18 116/66    Weight from Last 3 Encounters:   07/24/18 141 lb (64 kg)   06/20/18 142 lb (64.4 kg)   05/17/18 139 lb (63 kg)              We Performed the Following     HALOPERIDOL DECANOATE INJ     INJECTION INTRAMUSCULAR OR SUB-Q        Primary Care Provider Office Phone # Fax #    Josey Lawton Chad [facility] [phone]       [address] [facility] [address] [address]        Equal Access to Services     [facility] AH: Hadii aad ku hadasho Soomaali, waaxda luqadaha, qaybta kaalmada adeegyada, waxay lauro gutierrez . So [facility] [phone].    ATENCIÓN: Si habla español, tiene a mariano disposición servicios gratuitos de asistencia lingüística. Llame al [phone].    We comply with applicable federal civil rights laws and [place] laws. We do not  discriminate on the basis of race, color, national origin, age, disability, sex, sexual orientation, or gender identity.            Thank you!     Thank you for choosing [facility]  for your care. Our goal is always to provide you with excellent care. Hearing back from our patients is one way we can continue to improve our services. Please take a few minutes to complete the written survey that you may receive in the mail after your visit with us. Thank you!             Your Updated Medication List - Protect others around you: Learn how to safely use, store and throw away your medicines at www.disposemymeds.org.          This list is accurate as of 8/14/18  9:30 AM.  Always use your most recent med list.                   Brand Name Dispense Instructions for use Diagnosis    albuterol 108 (90 Base) MCG/ACT inhaler    VENTOLIN HFA    18 g    Inhale 2 puffs into the lungs every 6 hours (PRN for shortness of breath)    Asthma, intermittent, uncomplicated       atropine 0.1 mg/mL Susp suspension      Take by mouth At Bedtime        B Complex-C Tabs     90 tablet    TAKE ONE TABLET BY MOUTH EVERY DAY    Iron deficiency       CERTAVITE/ANTIOXIDANTS Tabs tablet   Generic drug:  multivitamin, therapeutic with minerals     30 tablet    TAKE ONE TABLET BY MOUTH EVERY DAY    Schizoaffective disorder, unspecified type (H)       Clozapine 200 MG tablet    CLOZARIL     150 mg at breakfast and 650 mg after dinner        DEPEND UNDERGARMENTS Misc     31 each    1 each daily as needed MEDIUM sized briefs    Urinary incontinence, unspecified type       desvenlafaxine succinate 50 MG 24 hr tablet    PRISTIQ     Take 50 mg by mouth        DIPHENDRYL PO      Take 50 mg by mouth 3 times daily as needed        Ferrous Sulfate 324 (65 Fe) MG Tbec     100 tablet    TAKE ONE TABLET BY MOUTH TWICE DAILY    Iron deficiency       haloperidol decanoate 100 MG/ML injection    HALDOL DECANOATE    1 mL    Inject 100 mg into the  muscle every 21 days Order scanned into [facility], Order from Jody Schoenecker ([facility]) ph:[phone] Received on 12/05/2017. Refills 12.    Schizophrenia, unspecified type (H), Bipolar affective disorder, remission status unspecified (H)       * HALOPERIDOL PO      Take 5 mg by mouth every 3 hours as needed for agitation (up to three daily)        * HALOPERIDOL PO      Take 10 mg by mouth At Bedtime        levothyroxine 50 MCG tablet    SYNTHROID/LEVOTHROID    90 tablet    TAKE ONE TABLET BY MOUTH EVERY DAY    Hypothyroidism, unspecified type       lithium 300 MG tablet      Take 300 mg by mouth 2 times daily        LORAZEPAM PO      Take 1 mg by mouth every 3 hours as needed for anxiety        * nicotine polacrilex 2 MG gum    NICORETTE STARTER KIT    100 tablet    Place 1 each (2 mg) inside cheek as needed for smoking cessation As needed every 1-2 hours    Cigarette nicotine dependence with nicotine-induced disorder       * nicotine polacrilex 2 MG gum    NICORETTE STARTER KIT    90 tablet    Place 1 each (2 mg) inside cheek as needed for smoking cessation    Cigarette nicotine dependence with nicotine-induced disorder       * order for DME     1 each    INCONTINENT PRODUCTS (UP / MONTH)    Chronic constipation, Schizophrenia, unspecified type (H), Schizoaffective disorder, unspecified type (H), Disturbance of conduct, Extrapyramidal symptom, Urinary incontinence, unspecified type, Bipolar affective disorder, remission status unspecified (H)       * order for DME     1 each    GLOVES    Chronic constipation, Schizophrenia, unspecified type (H), Schizoaffective disorder, unspecified type (H), Disturbance of conduct, Extrapyramidal symptom, Urinary incontinence, unspecified type, Bipolar affective disorder, remission status unspecified (H)       * order for DME     1 each    INCONTINENT PERSONAL WIPES    Chronic constipation, Schizophrenia, unspecified type (H), Schizoaffective disorder,  unspecified type (H), Disturbance of conduct, Extrapyramidal symptom, Urinary incontinence, unspecified type, Bipolar affective disorder, remission status unspecified (H)       * order for DME     1 each    CHUX PADS    Chronic constipation, Schizophrenia, unspecified type (H), Schizoaffective disorder, unspecified type (H), Disturbance of conduct, Extrapyramidal symptom, Urinary incontinence, unspecified type, Bipolar affective disorder, remission status unspecified (H)       pantoprazole 20 MG EC tablet    PROTONIX    30 tablet    TAKE ONE TABLET BY MOUTH EVERY DAY    Gastroesophageal reflux disease without esophagitis       polyethylene glycol powder    MIRALAX/GLYCOLAX    527 g    Take 17 g (1 capful) by mouth daily    Chronic constipation       sennosides 8.6 MG tablet    SENOKOT    200 tablet    TAKE THREE TABLETS BY MOUTH TWICE DAILY    Chronic constipation       SF 5000 PLUS 1.1 % Crea   Generic drug:  Sodium Fluoride      Apply to affected area At Bedtime        simethicone 80 MG chewable tablet    MYLICON    180 tablet    Take 1 tablet (80 mg) by mouth every 6 hours as needed for flatulence or cramping PRN    Flatulence, eructation and gas pain       TRAZODONE HCL PO      Take 200 mg by mouth At Bedtime        * Notice:  This list has 8 medication(s) that are the same as other medications prescribed for you. Read the directions carefully, and ask your doctor or other care provider to review them with you.

## 2018-08-14 NOTE — PROGRESS NOTES
Pt see's mental health provider in 2 weeks.  Staff reminded that we need new order for further injections.  Also wrote on pt's paperwork need new order for Haldol.  Staff will have new order faxed.

## 2018-08-16 LAB
CLOZAPINE AND METABOLITES TOTAL: 660 NG/ML
CLOZAPINE SERPL-MCNC: 397 NG/ML
CLOZAPINE-N-OXIDE QUANT: <100 NG/ML
NORCLOZAPINE SERPL-MCNC: 263 NG/ML

## 2018-08-28 ENCOUNTER — OFFICE VISIT (OUTPATIENT)
Dept: UROLOGY | Facility: CLINIC | Age: 48
End: 2018-08-28
Payer: MEDICARE

## 2018-08-28 ENCOUNTER — MEDICAL CORRESPONDENCE (OUTPATIENT)
Dept: HEALTH INFORMATION MANAGEMENT | Facility: CLINIC | Age: 48
End: 2018-08-28

## 2018-08-28 VITALS
TEMPERATURE: 97.8 F | BODY MASS INDEX: 25.95 KG/M2 | SYSTOLIC BLOOD PRESSURE: 96 MMHG | RESPIRATION RATE: 18 BRPM | DIASTOLIC BLOOD PRESSURE: 69 MMHG | WEIGHT: 141 LBS | HEART RATE: 88 BPM | HEIGHT: 62 IN | OXYGEN SATURATION: 100 %

## 2018-08-28 DIAGNOSIS — N39.46 MIXED INCONTINENCE URGE AND STRESS (MALE)(FEMALE): Primary | ICD-10-CM

## 2018-08-28 LAB
ALBUMIN UR-MCNC: NEGATIVE MG/DL
AMORPH CRY #/AREA URNS HPF: ABNORMAL /HPF
APPEARANCE UR: NORMAL
BILIRUB UR QL STRIP: NEGATIVE
COLOR UR AUTO: YELLOW
GLUCOSE UR STRIP-MCNC: NEGATIVE MG/DL
HGB UR QL STRIP: NEGATIVE
KETONES UR STRIP-MCNC: NEGATIVE MG/DL
LEUKOCYTE ESTERASE UR QL STRIP: NEGATIVE
NITRATE UR QL: NEGATIVE
NON-SQ EPI CELLS #/AREA URNS LPF: ABNORMAL /LPF
PH UR STRIP: 6 PH (ref 5–7)
RBC #/AREA URNS AUTO: ABNORMAL /HPF
SOURCE: NORMAL
SP GR UR STRIP: 1.02 (ref 1–1.03)
UROBILINOGEN UR STRIP-ACNC: 0.2 EU/DL (ref 0.2–1)
WBC #/AREA URNS AUTO: ABNORMAL /HPF

## 2018-08-28 PROCEDURE — 81001 URINALYSIS AUTO W/SCOPE: CPT | Performed by: UROLOGY

## 2018-08-28 PROCEDURE — 99205 OFFICE O/P NEW HI 60 MIN: CPT | Performed by: UROLOGY

## 2018-08-28 RX ORDER — TOLTERODINE 4 MG/1
4 CAPSULE, EXTENDED RELEASE ORAL DAILY
Qty: 30 CAPSULE | Refills: 1 | Status: SHIPPED | OUTPATIENT
Start: 2018-08-28 | End: 2019-07-30

## 2018-08-28 NOTE — MR AVS SNAPSHOT
After Visit Summary   8/28/2018    Doreen Gramajo    MRN: [id number]           Patient Information     Date Of Birth          1970        Visit Information        Provider Department      8/28/2018 1:45 PM Ciro Wang MD [facility]        Today's Diagnoses     Mixed incontinence urge and stress (male)(female)    -  1      Care Instructions    If you have questions or concerns on any instructions given to you by your provider today or if you need to schedule an appointment, you can reach us at [phone].                         Follow-ups after your visit        Follow-up notes from your care team     Return if symptoms worsen or fail to improve.      Your next 10 appointments already scheduled     Sep 04, 2018  9:15 AM CDT   LAB with PI LAB   [facility]    [address] [place] [id number] [phone]           Please do not eat 10-12 hours before your appointment if you are coming in fasting for labs on lipids, cholesterol, or glucose (sugar). This does not apply to pregnant women. Water, hot tea and black coffee (with nothing added) are okay. Do not drink other fluids, diet soda or chew gum.            Sep 04, 2018  9:30 AM CDT   Nurse Only with [place] PI CMA/LPN   [facility]    [address] [facility] [id number] [phone]            Sep 27, 2018  3:45 PM CDT   Nurse Only with [place] PI RN   [facility] ([facility]    [address] [place] [id number] [phone]            Oct 09, 2018 11:45 AM CDT   Return Visit with Ciro Wang MD   [facility] ([facility])    [address] [facility] [address] [phone]              Who to contact     If you have questions or need follow up information about today's clinic visit or your schedule please contact [facility] directly at [phone].  Normal or non-critical lab and imaging results will be communicated to you by MyChart, letter or phone within 4 business days after the clinic has received the results. If you do not hear from us within 7 days, please contact the clinic through MyChart or phone. If you have a critical or abnormal lab result, we will notify you by phone as soon as possible.  Submit refill requests through [email or web address] or call your pharmacy and they will forward the refill request to us. Please allow 3 business days for your refill to be completed.          Additional Information About Your Visit        Care EveryWhere ID     This is your Care EveryWhere ID. This could be used by other organizations to access your [facility] medical records  [id number]        Your Vitals Were     Pulse Temperature Respirations Height Pulse Oximetry BMI (Body Mass Index)    88 97.8  F (36.6  C) 18 1.575 m (5' 2\") 100% 25.79 kg/m2       Blood Pressure from Last 3 Encounters:   08/28/18 96/69   07/24/18 122/66   06/20/18 106/82    Weight from Last 3 Encounters:   08/28/18 64 kg (141 lb)   07/24/18 64 kg (141 lb)   06/20/18 64.4 kg (142 lb)              We Performed the Following     UA reflex to Microscopic and Culture [[id number]]     Urine Microscopic          Today's Medication Changes          These changes are accurate as of 8/28/18  2:52 PM.  If you have any questions, ask your nurse or doctor.               Start taking these medicines.        Dose/Directions    tolterodine 4 MG 24 hr capsule   Commonly known as:  DETROL LA   Used for:  Mixed incontinence urge and stress (male)(female)   Started by:  Ciro Wang MD        Dose:  4 mg   Take 1 capsule (4 mg) by mouth daily   Quantity:  30 capsule   Refills:  1            Where to get your medicines      These medications were sent to [facility] [place], [place] - [address]  [address] [facility] [address]    Hours:  " test rx sent successfully  2/8/05   Phone:  [phone]     tolterodine 4 MG 24 hr capsule                Primary Care Provider Office Phone # Fax #    Josey Bonilla, ABIGAIL [phone]       [address] [facility] [address]        Equal Access to Services     JOURDAN MARTINEZ : Hadii aad ku hadasho Soomaali, waaxda luqadaha, qaybta kaalmada adeegyada, alise dohertytesfayebijal gutierrez . So [facility] [phone].    ATENCIÓN: Si habla español, tiene a mariano disposición servicios gratuitos de asistencia lingüística. Uyen al [phone].    We comply with applicable federal civil rights laws and [place] laws. We do not discriminate on the basis of race, color, national origin, age, disability, sex, sexual orientation, or gender identity.            Thank you!     Thank you for choosing [facility]  for your care. Our goal is always to provide you with excellent care. Hearing back from our patients is one way we can continue to improve our services. Please take a few minutes to complete the written survey that you may receive in the mail after your visit with us. Thank you!             Your Updated Medication List - Protect others around you: Learn how to safely use, store and throw away your medicines at www.disposemymeds.org.          This list is accurate as of 8/28/18  2:52 PM.  Always use your most recent med list.                   Brand Name Dispense Instructions for use Diagnosis    albuterol 108 (90 Base) MCG/ACT inhaler    VENTOLIN HFA    18 g    Inhale 2 puffs into the lungs every 6 hours (PRN for shortness of breath)    Asthma, intermittent, uncomplicated       atropine 0.1 mg/mL Susp suspension      Take by mouth At Bedtime        B Complex-C Tabs     90 tablet    TAKE ONE TABLET BY MOUTH EVERY DAY    Iron deficiency       CERTAVITE/ANTIOXIDANTS Tabs tablet   Generic drug:  multivitamin, therapeutic with minerals     30 tablet    TAKE ONE TABLET BY MOUTH EVERY  DAY    Schizoaffective disorder, unspecified type (H)       Clozapine 200 MG tablet    CLOZARIL     150 mg at breakfast and 650 mg after dinner        DEPEND UNDERGARMENTS Misc     31 each    1 each daily as needed MEDIUM sized briefs    Urinary incontinence, unspecified type       desvenlafaxine succinate 50 MG 24 hr tablet    PRISTIQ     Take 50 mg by mouth        DIPHENDRYL PO      Take 50 mg by mouth 3 times daily as needed        Ferrous Sulfate 324 (65 Fe) MG Tbec     100 tablet    TAKE ONE TABLET BY MOUTH TWICE DAILY    Iron deficiency       haloperidol decanoate 100 MG/ML injection    HALDOL DECANOATE    1 mL    Inject 100 mg into the muscle every 21 days Order scanned into "[employer]", Order from Jody Schoenecker ([facility]) ph:[phone] Received on 12/05/2017. Refills 12.    Schizophrenia, unspecified type (H), Bipolar affective disorder, remission status unspecified (H)       * HALOPERIDOL PO      Take 5 mg by mouth every 3 hours as needed for agitation (up to three daily)        * HALOPERIDOL PO      Take 10 mg by mouth At Bedtime        levothyroxine 50 MCG tablet    SYNTHROID/LEVOTHROID    90 tablet    TAKE ONE TABLET BY MOUTH EVERY DAY    Hypothyroidism, unspecified type       lithium 300 MG tablet      Take 300 mg by mouth 2 times daily        LORAZEPAM PO      Take 1 mg by mouth every 3 hours as needed for anxiety        * nicotine polacrilex 2 MG gum    NICORETTE STARTER KIT    100 tablet    Place 1 each (2 mg) inside cheek as needed for smoking cessation As needed every 1-2 hours    Cigarette nicotine dependence with nicotine-induced disorder       * nicotine polacrilex 2 MG gum    NICORETTE STARTER KIT    90 tablet    Place 1 each (2 mg) inside cheek as needed for smoking cessation    Cigarette nicotine dependence with nicotine-induced disorder       * order for DME     1 each    INCONTINENT PRODUCTS (UP / MONTH)    Chronic constipation, Schizophrenia, unspecified type (H),  Schizoaffective disorder, unspecified type (H), Disturbance of conduct, Extrapyramidal symptom, Urinary incontinence, unspecified type, Bipolar affective disorder, remission status unspecified (H)       * order for DME     1 each    GLOVES    Chronic constipation, Schizophrenia, unspecified type (H), Schizoaffective disorder, unspecified type (H), Disturbance of conduct, Extrapyramidal symptom, Urinary incontinence, unspecified type, Bipolar affective disorder, remission status unspecified (H)       * order for DME     1 each    INCONTINENT PERSONAL WIPES    Chronic constipation, Schizophrenia, unspecified type (H), Schizoaffective disorder, unspecified type (H), Disturbance of conduct, Extrapyramidal symptom, Urinary incontinence, unspecified type, Bipolar affective disorder, remission status unspecified (H)       * order for DME     1 each    CHUX PADS    Chronic constipation, Schizophrenia, unspecified type (H), Schizoaffective disorder, unspecified type (H), Disturbance of conduct, Extrapyramidal symptom, Urinary incontinence, unspecified type, Bipolar affective disorder, remission status unspecified (H)       pantoprazole 20 MG EC tablet    PROTONIX    30 tablet    TAKE ONE TABLET BY MOUTH EVERY DAY    Gastroesophageal reflux disease without esophagitis       polyethylene glycol powder    MIRALAX/GLYCOLAX    527 g    Take 17 g (1 capful) by mouth daily    Chronic constipation       sennosides 8.6 MG tablet    SENOKOT    200 tablet    TAKE THREE TABLETS BY MOUTH TWICE DAILY    Chronic constipation       SF 5000 PLUS 1.1 % Crea   Generic drug:  Sodium Fluoride      Apply to affected area At Bedtime        simethicone 80 MG chewable tablet    MYLICON    180 tablet    Take 1 tablet (80 mg) by mouth every 6 hours as needed for flatulence or cramping PRN    Flatulence, eructation and gas pain       tolterodine 4 MG 24 hr capsule    DETROL LA    30 capsule    Take 1 capsule (4 mg) by mouth daily    Mixed incontinence  urge and stress (male)(female)       TRAZODONE HCL PO      Take 200 mg by mouth At Bedtime        * Notice:  This list has 8 medication(s) that are the same as other medications prescribed for you. Read the directions carefully, and ask your doctor or other care provider to review them with you.

## 2018-08-28 NOTE — PROGRESS NOTES
"Doreen Gramajo is a 47 year old female seen in consultation for incont. Consult from Josey Bonilla.  (Presents today with her caregiver)    Pt with several  concerns:  1. Urge and urge incont; wears 3 pads per day, protective brief at nite  2. Nocturnal enuresis      Pt states issues have been progressive over the last 10 yrs, now very bothersome.    Denies dysuria, gross hematuria. Voids q hr during the day. Nocturia x 4, \"large amounts.\"    Denies significant UTI's, prior  eval, hx bladder surgery, use of bladder meds, success with Kegel's.    Never pregnant. Not on HRT. Intermittent constipation; did not eat breakfast this am; hx anorexia.    Hx rectopexy x 4, now on large doses of fiber supplement.    Hx paranoid schizophrenia, other medical conditions noted.    Drinks 1 caf coffee, 1 tea, occas soda, 3-4 [place] per day. Works as a . (Did not complete voiding diary).    Smokes half PPD x 27 yrs, trying to quit.      Past Medical History:   Diagnosis Date     Anxiety      Asthma, intermittent      Constipation      Depressive disorder      GERD (gastroesophageal reflux disease)      Hemorrhoids, external without complications 2/2013     Hypothyroid      Schizophrenia (H)     Schizophrenia       Past Surgical History:   Procedure Laterality Date     DAVINCI RECTOPEXY  4/4/2013    Procedure: DAVINCI RECTOPEXY;  davinci assisted ventral rectopexy,colonoscopy,and Anesthesia general with block;  Surgeon: Stephen Singh MD;  Location: [facility] OR     GI SURGERY      prolapsed rectum     NO HISTORY OF SURGERY         Social History     Social History     Marital status: Single     Spouse name: N/A     Number of children: N/A     Years of education: N/A     Occupational History     Not on file.     Social History Main Topics     Smoking status: Current Every Day Smoker     Packs/day: 1.00     Types: Cigarettes     Smokeless tobacco: Former User     Quit date: 2/17/2014     Alcohol use No     Drug use: " No     Sexual activity: No     Other Topics Concern      Service Yes     [employer]  [id number]     Blood Transfusions No     Caffeine Concern No     Occupational Exposure No     Hobby Hazards No     Sleep Concern No     Stress Concern No     Weight Concern No     Special Diet No     Back Care No     Exercise No     Bike Helmet No     Seat Belt Yes     Self-Exams No     Social History Narrative    ENVIRONMENTAL HISTORY: [place] The home is heated with a forced air and gas furnace. They does have central air conditioning. The patient's bedroom is furnished with stuffed animals in bed, carpeting in bedroom and fabric window coverings.  Pets inside the house include 1 cat(s). There is not history of cockroach or mice infestation. There is/are 1 smokers in the house.  The house does not have a damp basement.                Current Outpatient Prescriptions   Medication Sig Dispense Refill     albuterol (VENTOLIN HFA) 108 (90 Base) MCG/ACT Inhaler Inhale 2 puffs into the lungs every 6 hours (PRN for shortness of breath) 18 g 6     atropine 0.1 mg/mL Take by mouth At Bedtime       B Complex-C TABS TAKE ONE TABLET BY MOUTH EVERY DAY 90 tablet 1     CERTAVITE/ANTIOXIDANTS TABS tablet TAKE ONE TABLET BY MOUTH EVERY DAY 30 tablet 5     cloZAPine (CLOZARIL) 200 MG tablet 150 mg at breakfast and 650 mg after dinner       desvenlafaxine succinate (PRISTIQ) 50 MG 24 hr tablet Take 50 mg by mouth       DiphenhydrAMINE HCl (DIPHENDRYL PO) Take 50 mg by mouth 3 times daily as needed        Ferrous Sulfate 324 (65 Fe) MG TBEC TAKE ONE TABLET BY MOUTH TWICE DAILY 100 tablet 1     haloperidol decanoate (HALDOL DECANOATE) 100 MG/ML injection Inject 100 mg into the muscle every 21 days Order [facility], Order from Jody Schoenecker ([facility]) ph:[phone] Received on 12/05/2017. Refills 12. 1 mL 12     HALOPERIDOL PO Take 10 mg by mouth At Bedtime        HALOPERIDOL PO Take 5 mg by mouth every 3 hours as needed for agitation (up to three daily)        Incontinence Supply Disposable (DEPEND UNDERGARMENTS) MISC 1 each daily as needed MEDIUM sized briefs 31 each 11     levothyroxine (SYNTHROID/LEVOTHROID) 50 MCG tablet TAKE ONE TABLET BY MOUTH EVERY DAY 90 tablet 3     lithium 300 MG tablet Take 300 mg by mouth 2 times daily       LORAZEPAM PO Take 1 mg by mouth every 3 hours as needed for anxiety       nicotine polacrilex (NICORETTE STARTER KIT) 2 MG gum Place 1 each (2 mg) inside cheek as needed for smoking cessation 90 tablet 0     nicotine polacrilex (NICORETTE STARTER KIT) 2 MG gum Place 1 each (2 mg) inside cheek as needed for smoking cessation As needed every 1-2 hours 100 tablet 0     order for DME INCONTINENT PRODUCTS (UP / MONTH) 1 each 11     order for DME GLOVES 1 each 11     order for DME INCONTINENT PERSONAL WIPES 1 each 11     order for DME CHUX PADS 1 each 11     pantoprazole (PROTONIX) 20 MG EC tablet TAKE ONE TABLET BY MOUTH EVERY DAY 30 tablet 11     polyethylene glycol (MIRALAX/GLYCOLAX) powder Take 17 g (1 capful) by mouth daily 527 g 11     sennosides (SENOKOT) 8.6 MG tablet TAKE THREE TABLETS BY MOUTH TWICE DAILY 200 tablet 11     simethicone (MYLICON) 80 MG chewable tablet Take 1 tablet (80 mg) by mouth every 6 hours as needed for flatulence or cramping  tablet 3     Sodium Fluoride (SF 5000 PLUS) 1.1 % CREA Apply to affected area At Bedtime       TRAZODONE HCL PO Take 200 mg by mouth At Bedtime         Physical Exam:    GENL: NAD. Strong tobacco odor. Flat affect, tardive dyskinesia.   ABD: Soft, non-tender, no masses.    EG: Well-estrogenized, no masses.    VAGINA: Well-estrogenized, no masses.    BN HYPERMOBILITY: None.    CYSTOCELE: None.    APICAL PROLAPSE: None.    RECTOCELE: None.    BIMANUAL: No mass or tenderness.    Cysto:    (Informed consent obtained. Pause for cause performed)   Sterile prep.    17 Fr scope inserted  through urethra. Systematic examination w 70 degree lens.   PVR: 5 cc   MUCOSA: Normal without lesion; saccules and tics throughout   ORIFICES: Normal location and morphology   CAPACITY: 400 cc; no pain with filling >> apparent UDC with contraction and massive leak of essentially all bladder contents   Scope withdrawn without untoward effect.    (Pt tolerated procedure without difficulty).      Results for orders placed or performed in visit on 08/28/18   UA reflex to Microscopic and Culture [[id number]]   Result Value Ref Range    Color Urine Yellow     Appearance Urine Slightly Cloudy     Glucose Urine Negative NEG^Negative mg/dL    Bilirubin Urine Negative NEG^Negative    Ketones Urine Negative NEG^Negative mg/dL    Specific Gravity Urine 1.025 1.003 - 1.035    Blood Urine Negative NEG^Negative    pH Urine 6.0 5.0 - 7.0 pH    Protein Albumin Urine Negative NEG^Negative mg/dL    Urobilinogen Urine 0.2 0.2 - 1.0 EU/dL    Nitrite Urine Negative NEG^Negative    Leukocyte Esterase Urine Negative NEG^Negative    Source Midstream Urine    Urine Microscopic   Result Value Ref Range    WBC Urine 0 - 5 OTO5^0 - 5 /HPF    RBC Urine O - 2 OTO2^O - 2 /HPF    Squamous Epithelial /LPF Urine Moderate (A) FEW^Few /LPF    Amorphous Crystals Few (A) NEG^Negative /HPF         IMP:  1. OAB wet, severe, psychosis  2. Tobacco, other medical issues      PLAN:  1. Discussed situation with patient in detail.  2. Watch intake of soda, caffinated beverages  3. Consider trial Detrol; discussed rationale, mech of action, potential side effect,etc; pt elects trial  4. RTC 2 mos to review progress  5. Set realistic expectations  6. 60 minutes spent with patient, more than 50% in counseling and coordination of care for OAB, which did not include time spent for the procedure.  7. Copy

## 2018-08-28 NOTE — NURSING NOTE
Pt brought back to the procedure room for a cystoscopy per Dr. Wang  Informed consent obtained, pt prepped in a sterile manner.    Scope Number: Kishore Storz 70 Rigid: [id number]     Antonia GILLESPIE MA

## 2018-08-28 NOTE — PATIENT INSTRUCTIONS
If you have questions or concerns on any instructions given to you by your provider today or if you need to schedule an appointment, you can reach us at [phone].

## 2018-08-28 NOTE — NURSING NOTE
"Chief Complaint   Patient presents with     Consult For     urinary incontinence       Initial BP 96/69 (BP Location: Left arm, Patient Position: Chair, Cuff Size: Adult Regular)  Pulse 88  Temp 97.8  F (36.6  C)  Resp 18  Ht 1.575 m (5' 2\")  Wt 64 kg (141 lb)  SpO2 100%  BMI 25.79 kg/m2 Estimated body mass index is 25.79 kg/(m^2) as calculated from the following:    Height as of this encounter: 1.575 m (5' 2\").    Weight as of this encounter: 64 kg (141 lb).  BP completed using cuff size: regular  Medications and allergies reviewed.      Antonia GILLESPIE MA    "

## 2018-08-29 ENCOUNTER — TELEPHONE (OUTPATIENT)
Dept: UROLOGY | Facility: CLINIC | Age: 48
End: 2018-08-29

## 2018-08-29 DIAGNOSIS — Z79.899 HIGH RISK MEDICATION USE: ICD-10-CM

## 2018-08-29 DIAGNOSIS — N39.46 MIXED INCONTINENCE URGE AND STRESS (MALE)(FEMALE): Primary | ICD-10-CM

## 2018-08-29 DIAGNOSIS — F20.0 PARANOID SCHIZOPHRENIA, SUBCHRONIC CONDITION WITH ACUTE EXACERBATION (H): Primary | ICD-10-CM

## 2018-08-29 RX ORDER — MIRABEGRON 50 MG/1
50 TABLET, EXTENDED RELEASE ORAL DAILY
Qty: 30 TABLET | Refills: 1 | Status: SHIPPED | OUTPATIENT
Start: 2018-08-29 | End: 2018-09-28

## 2018-08-29 NOTE — TELEPHONE ENCOUNTER
Signed Prescriptions:                        Disp   Refills    mirabegron (MYRBETRIQ) 50 MG 24 hr tablet  30 tab*1        Sig: Take 1 tablet (50 mg) by mouth daily  Authorizing Provider: RASHEEDA MORALES  Ordering User: TRAVIS GARRETT RN

## 2018-09-04 ENCOUNTER — ALLIED HEALTH/NURSE VISIT (OUTPATIENT)
Dept: FAMILY MEDICINE | Facility: CLINIC | Age: 48
End: 2018-09-04
Payer: MEDICARE

## 2018-09-04 DIAGNOSIS — F20.0 PARANOID SCHIZOPHRENIA (H): Primary | Chronic | ICD-10-CM

## 2018-09-04 DIAGNOSIS — Z79.899 HIGH RISK MEDICATION USE: ICD-10-CM

## 2018-09-04 DIAGNOSIS — F20.0 PARANOID SCHIZOPHRENIA, SUBCHRONIC CONDITION WITH ACUTE EXACERBATION (H): ICD-10-CM

## 2018-09-04 DIAGNOSIS — Z79.899 NEED FOR PROPHYLACTIC CHEMOTHERAPY: ICD-10-CM

## 2018-09-04 DIAGNOSIS — F25.9 SCHIZOAFFECTIVE DISORDER, UNSPECIFIED TYPE (H): Chronic | ICD-10-CM

## 2018-09-04 LAB
ANION GAP SERPL CALCULATED.3IONS-SCNC: 7 MMOL/L (ref 3–14)
BASOPHILS # BLD AUTO: 0 10E9/L (ref 0–0.2)
BASOPHILS NFR BLD AUTO: 0.3 %
BUN SERPL-MCNC: 14 MG/DL (ref 7–30)
CALCIUM SERPL-MCNC: 9.3 MG/DL (ref 8.5–10.1)
CHLORIDE SERPL-SCNC: 107 MMOL/L (ref 94–109)
CO2 SERPL-SCNC: 25 MMOL/L (ref 20–32)
CREAT SERPL-MCNC: 0.97 MG/DL (ref 0.52–1.04)
DIFFERENTIAL METHOD BLD: ABNORMAL
EOSINOPHIL # BLD AUTO: 0 10E9/L (ref 0–0.7)
EOSINOPHIL NFR BLD AUTO: 0.1 %
ERYTHROCYTE [DISTWIDTH] IN BLOOD BY AUTOMATED COUNT: 13.5 % (ref 10–15)
GFR SERPL CREATININE-BSD FRML MDRD: 61 ML/MIN/1.7M2
GLUCOSE SERPL-MCNC: 112 MG/DL (ref 70–99)
HCT VFR BLD AUTO: 38.6 % (ref 35–47)
HGB BLD-MCNC: 12.5 G/DL (ref 11.7–15.7)
LITHIUM SERPL-SCNC: 0.64 MMOL/L (ref 0.6–1.2)
LYMPHOCYTES # BLD AUTO: 1.8 10E9/L (ref 0.8–5.3)
LYMPHOCYTES NFR BLD AUTO: 12.9 %
MCH RBC QN AUTO: 33.8 PG (ref 26.5–33)
MCHC RBC AUTO-ENTMCNC: 32.4 G/DL (ref 31.5–36.5)
MCV RBC AUTO: 104 FL (ref 78–100)
MONOCYTES # BLD AUTO: 0.9 10E9/L (ref 0–1.3)
MONOCYTES NFR BLD AUTO: 6.3 %
NEUTROPHILS # BLD AUTO: 11.1 10E9/L (ref 1.6–8.3)
NEUTROPHILS NFR BLD AUTO: 80.4 %
PLATELET # BLD AUTO: 371 10E9/L (ref 150–450)
POTASSIUM SERPL-SCNC: 4 MMOL/L (ref 3.4–5.3)
RBC # BLD AUTO: 3.7 10E12/L (ref 3.8–5.2)
SODIUM SERPL-SCNC: 139 MMOL/L (ref 133–144)
TSH SERPL DL<=0.005 MIU/L-ACNC: 2.48 MU/L (ref 0.4–4)
WBC # BLD AUTO: 13.8 10E9/L (ref 4–11)

## 2018-09-04 PROCEDURE — 99207 ZZC NO CHARGE NURSE ONLY: CPT

## 2018-09-04 PROCEDURE — 36415 COLL VENOUS BLD VENIPUNCTURE: CPT | Performed by: NURSE PRACTITIONER

## 2018-09-04 PROCEDURE — 80178 ASSAY OF LITHIUM: CPT | Performed by: NURSE PRACTITIONER

## 2018-09-04 PROCEDURE — 84443 ASSAY THYROID STIM HORMONE: CPT | Performed by: NURSE PRACTITIONER

## 2018-09-04 PROCEDURE — 80048 BASIC METABOLIC PNL TOTAL CA: CPT | Performed by: NURSE PRACTITIONER

## 2018-09-04 PROCEDURE — 85025 COMPLETE CBC W/AUTO DIFF WBC: CPT | Performed by: NURSE PRACTITIONER

## 2018-09-04 PROCEDURE — 80159 DRUG ASSAY CLOZAPINE: CPT | Mod: 90 | Performed by: NURSE PRACTITIONER

## 2018-09-04 PROCEDURE — 96372 THER/PROPH/DIAG INJ SC/IM: CPT

## 2018-09-04 RX ORDER — HALOPERIDOL DECANOATE 100 MG/ML
100 INJECTION INTRAMUSCULAR
Qty: 1 ML | Refills: 5 | COMMUNITY
Start: 2018-09-04 | End: 2019-12-03

## 2018-09-04 NOTE — PROGRESS NOTES
Received prescription from [facility] signed by Jody Michelle Schoenecker NP Haloperidol decanoate 100mg every 3 weeks. Refills: 5 - Intramuscular  Copy sent to be scanned into chart

## 2018-09-04 NOTE — TELEPHONE ENCOUNTER
"Requested Prescriptions   Pending Prescriptions Disp Refills     CERTAVITE/ANTIOXIDANTS TABS tablet [Pharmacy Med Name: CERTAVITE-ANTIOXIDANT 18MG-0.4MG TABLET] 30 tablet      Sig: TAKE ONE TABLET BY MOUTH EVERY DAY    Vitamin Supplements (Adult) Protocol Passed    9/4/2018 12:20 PM       Passed - High dose Vitamin D not ordered       Passed - Recent (12 mo) or future (30 days) visit within the authorizing provider's specialty    Patient had office visit in the last 12 months or has a visit in the next 30 days with authorizing provider or within the authorizing provider's specialty.  See \"Patient Info\" tab in inbasket, or \"Choose Columns\" in Meds & Orders section of the refill encounter.            Last Written Prescription Date:  3/13/18  Last Fill Quantity: 30,  # refills: 5   Last office visit: 9/4/2018 with prescribing provider:     Future Office Visit:   Next 5 appointments (look out 90 days)     Sep 27, 2018  3:45 PM CDT   Nurse Only with [place] PI RN   [facility]    [address] [facility] [address]   [phone]            Oct 09, 2018 11:45 AM CDT   Return Visit with Ciro Wang MD   [facility]    [address] [facility] [address] [phone]                   "

## 2018-09-04 NOTE — MR AVS SNAPSHOT
After Visit Summary   9/4/2018    Doreen Gramajo    MRN: [id number]           Patient Information     Date Of Birth          1970        Visit Information        Provider Department      9/4/2018 9:30 AM IVETT ARAMBULA CMA/LPN [facility]        Today's Diagnoses     Paranoid schizophrenia (H)    -  1       Follow-ups after your visit        Your next 10 appointments already scheduled     Sep 27, 2018  3:45 PM CDT   Nurse Only with IVETT ARAMBULA RN   [facility] ([facility])    [address] [facility] [address]   [phone]            Oct 09, 2018 11:45 AM CDT   Return Visit with Ciro Wang MD   [facility] ([facility])    [address] [facility] [address] [phone]              Future tests that were ordered for you today     Open Standing Orders        Priority Remaining Interval Expires Ordered    HALOPERIDOL DECANOATE INJ Routine 5/6 every 3 weeeks 9/4/2019 9/4/2018    THER/PROPH/DIAG INJ, SC/IM Routine 5/6 9/4/2019 9/4/2018            Who to contact     If you have questions or need follow up information about today's clinic visit or your schedule please contact [facility] directly at [phone].  Normal or non-critical lab and imaging results will be communicated to you by MyChart, letter or phone within 4 business days after the clinic has received the results. If you do not hear from us within 7 days, please contact the clinic through MyChart or phone. If you have a critical or abnormal lab result, we will notify you by phone as soon as possible.  Submit refill requests through [employer] or call your pharmacy and they will forward the refill request to us. Please allow 3 business days for your refill to be completed.          Additional Information About Your Visit        Care EveryWhere ID     This is your Care EveryWhere ID. This could be used by other organizations to access  your [facility] medical records  [id number]         Blood Pressure from Last 3 Encounters:   08/28/18 96/69   07/24/18 122/66   06/20/18 106/82    Weight from Last 3 Encounters:   08/28/18 141 lb (64 kg)   07/24/18 141 lb (64 kg)   06/20/18 142 lb (64.4 kg)               Primary Care Provider Office Phone # Fax #    Josey Bonilla, [facility] [phone]       [address] [facility] [address]        Equal Access to Services     [facility] : Hadii aad ku hadasho Soomaali, waaxda luqadaha, qaybta kaalmada adeethelyamarcela, alise gutierrez . So [facility] [phone].    ATENCIÓN: Si habla español, tiene a mariano disposición servicios gratuitos de asistencia lingüística. Llame al [phone].    We comply with applicable federal civil rights laws and [place] laws. We do not discriminate on the basis of race, color, national origin, age, disability, sex, sexual orientation, or gender identity.            Thank you!     Thank you for choosing [facility]  for your care. Our goal is always to provide you with excellent care. Hearing back from our patients is one way we can continue to improve our services. Please take a few minutes to complete the written survey that you may receive in the mail after your visit with us. Thank you!             Your Updated Medication List - Protect others around you: Learn how to safely use, store and throw away your medicines at www.disposemymeds.org.          This list is accurate as of 9/4/18  9:37 AM.  Always use your most recent med list.                   Brand Name Dispense Instructions for use Diagnosis    albuterol 108 (90 Base) MCG/ACT inhaler    VENTOLIN HFA    18 g    Inhale 2 puffs into the lungs every 6 hours (PRN for shortness of breath)    Asthma, intermittent, uncomplicated       atropine 0.1 mg/mL Susp suspension      Take by mouth At Bedtime        B Complex-C Tabs     90 tablet    TAKE ONE TABLET BY MOUTH EVERY DAY     Iron deficiency       CERTAVITE/ANTIOXIDANTS Tabs tablet   Generic drug:  multivitamin, therapeutic with minerals     30 tablet    TAKE ONE TABLET BY MOUTH EVERY DAY    Schizoaffective disorder, unspecified type (H)       Clozapine 200 MG tablet    CLOZARIL     150 mg at breakfast and 650 mg after dinner        DEPEND UNDERGARMENTS Misc     31 each    1 each daily as needed MEDIUM sized briefs    Urinary incontinence, unspecified type       desvenlafaxine succinate 50 MG 24 hr tablet    PRISTIQ     Take 50 mg by mouth        DIPHENDRYL PO      Take 50 mg by mouth 3 times daily as needed        Ferrous Sulfate 324 (65 Fe) MG Tbec     100 tablet    TAKE ONE TABLET BY MOUTH TWICE DAILY    Iron deficiency       HALDOL DECANOATE 100 MG/ML injection   Generic drug:  haloperidol decanoate     1 mL    Inject 100 mg into the muscle every 21 days Received prescription from [facility] signed by Jody Michelle Schoenecker NP Haloperidol decanoate 100mg every 3 weeks. Refills: 5    Paranoid schizophrenia (H)       * HALOPERIDOL PO      Take 5 mg by mouth every 3 hours as needed for agitation (up to three daily)        * HALOPERIDOL PO      Take 10 mg by mouth At Bedtime        levothyroxine 50 MCG tablet    SYNTHROID/LEVOTHROID    90 tablet    TAKE ONE TABLET BY MOUTH EVERY DAY    Hypothyroidism, unspecified type       lithium 300 MG tablet      Take 300 mg by mouth 2 times daily        LORAZEPAM PO      Take 1 mg by mouth every 3 hours as needed for anxiety        mirabegron 50 MG 24 hr tablet    MYRBETRIQ    30 tablet    Take 1 tablet (50 mg) by mouth daily    Mixed incontinence urge and stress (male)(female)       * nicotine polacrilex 2 MG gum    NICORETTE STARTER KIT    100 tablet    Place 1 each (2 mg) inside cheek as needed for smoking cessation As needed every 1-2 hours    Cigarette nicotine dependence with nicotine-induced disorder       * nicotine polacrilex 2 MG gum    NICORETTE STARTER KIT    90 tablet    Place 1  each (2 mg) inside cheek as needed for smoking cessation    Cigarette nicotine dependence with nicotine-induced disorder       * order for DME     1 each    INCONTINENT PRODUCTS (UP / MONTH)    Chronic constipation, Schizophrenia, unspecified type (H), Schizoaffective disorder, unspecified type (H), Disturbance of conduct, Extrapyramidal symptom, Urinary incontinence, unspecified type, Bipolar affective disorder, remission status unspecified (H)       * order for DME     1 each    GLOVES    Chronic constipation, Schizophrenia, unspecified type (H), Schizoaffective disorder, unspecified type (H), Disturbance of conduct, Extrapyramidal symptom, Urinary incontinence, unspecified type, Bipolar affective disorder, remission status unspecified (H)       * order for DME     1 each    INCONTINENT PERSONAL WIPES    Chronic constipation, Schizophrenia, unspecified type (H), Schizoaffective disorder, unspecified type (H), Disturbance of conduct, Extrapyramidal symptom, Urinary incontinence, unspecified type, Bipolar affective disorder, remission status unspecified (H)       * order for DME     1 each    CHUX PADS    Chronic constipation, Schizophrenia, unspecified type (H), Schizoaffective disorder, unspecified type (H), Disturbance of conduct, Extrapyramidal symptom, Urinary incontinence, unspecified type, Bipolar affective disorder, remission status unspecified (H)       pantoprazole 20 MG EC tablet    PROTONIX    30 tablet    TAKE ONE TABLET BY MOUTH EVERY DAY    Gastroesophageal reflux disease without esophagitis       polyethylene glycol powder    MIRALAX/GLYCOLAX    527 g    Take 17 g (1 capful) by mouth daily    Chronic constipation       sennosides 8.6 MG tablet    SENOKOT    200 tablet    TAKE THREE TABLETS BY MOUTH TWICE DAILY    Chronic constipation       SF 5000 PLUS 1.1 % Crea   Generic drug:  Sodium Fluoride      Apply to affected area At Bedtime        simethicone 80 MG chewable tablet    MYLICON    180  tablet    Take 1 tablet (80 mg) by mouth every 6 hours as needed for flatulence or cramping PRN    Flatulence, eructation and gas pain       tolterodine 4 MG 24 hr capsule    DETROL LA    30 capsule    Take 1 capsule (4 mg) by mouth daily    Mixed incontinence urge and stress (male)(female)       TRAZODONE HCL PO      Take 200 mg by mouth At Bedtime        * Notice:  This list has 8 medication(s) that are the same as other medications prescribed for you. Read the directions carefully, and ask your doctor or other care provider to review them with you.

## 2018-09-05 LAB
CLOZAPINE AND METABOLITES TOTAL: 802 NG/ML
CLOZAPINE SERPL-MCNC: 498 NG/ML
CLOZAPINE-N-OXIDE QUANT: <100 NG/ML
NORCLOZAPINE SERPL-MCNC: 304 NG/ML

## 2018-09-05 RX ORDER — FOLIC ACID/MV,IRON,MIN/LUTEIN 0.4-18-25
TABLET ORAL
Qty: 30 TABLET | Refills: 5 | Status: SHIPPED | OUTPATIENT
Start: 2018-09-05 | End: 2019-02-19

## 2018-09-27 ENCOUNTER — ALLIED HEALTH/NURSE VISIT (OUTPATIENT)
Dept: FAMILY MEDICINE | Facility: CLINIC | Age: 48
End: 2018-09-27
Payer: MEDICARE

## 2018-09-27 DIAGNOSIS — F20.0 PARANOID SCHIZOPHRENIA (H): Chronic | ICD-10-CM

## 2018-09-27 PROCEDURE — 96372 THER/PROPH/DIAG INJ SC/IM: CPT

## 2018-09-27 PROCEDURE — 99207 ZZC NO CHARGE NURSE ONLY: CPT

## 2018-09-27 NOTE — MR AVS SNAPSHOT
After Visit Summary   9/27/2018    Doreen Gramajo    MRN: [id number]           Patient Information     Date Of Birth          1970        Visit Information        Provider Department      9/27/2018 1:30 PM FL PI GREY/LPN [facility]        Today's Diagnoses     Paranoid schizophrenia (H)           Follow-ups after your visit        Your next 10 appointments already scheduled     Oct 09, 2018  9:00 AM CDT   Nurse Only with FL RALF RN   [facility] ([facility])    [address] [facility] [id number] [phone]            Oct 09, 2018  9:15 AM CDT   LAB with PI LAB   [facility] ([facility])    [address] [facility] [facility] [address] [phone]           Please do not eat 10-12 hours before your appointment if you are coming in fasting for labs on lipids, cholesterol, or glucose (sugar). This does not apply to pregnant women. Water, hot tea and black coffee (with nothing added) are okay. Do not drink other fluids, diet soda or chew gum.            Oct 09, 2018 11:45 AM CDT   Return Visit with Ciro Wang MD   [facility] ([facility]    [address] [facility] [address] [phone]            Oct 23, 2018  9:15 AM CDT   Nurse Only with FL PI RN   [facility] ([facility])    [address] [facility] [id number] [phone]              Who to contact     If you have questions or need follow up information about today's clinic visit or your schedule please contact [facility] directly at [phone].  Normal or non-critical lab and imaging results will be communicated to you by MyChart, letter or phone within 4 business days after the clinic has received the results. If you do not hear from us within 7 days, please contact the clinic through MyChart or phone. If you have a critical or abnormal lab  result, we will notify you by phone as soon as possible.  Submit refill requests through [employer] or call your pharmacy and they will forward the refill request to us. Please allow 3 business days for your refill to be completed.          Additional Information About Your Visit        Care EveryWhere ID     This is your Care EveryWhere ID. This could be used by other organizations to access your [facility] medical records  [id number]         Blood Pressure from Last 3 Encounters:   08/28/18 96/69   07/24/18 122/66   06/20/18 106/82    Weight from Last 3 Encounters:   08/28/18 141 lb (64 kg)   07/24/18 141 lb (64 kg)   06/20/18 142 lb (64.4 kg)              We Performed the Following     HALOPERIDOL DECANOATE INJ     THER/PROPH/DIAG INJ, SC/IM        Primary Care Provider Office Phone # Fax #    Josey [place]ton [place], [facility] [phone]       [address] [address]        Equal Access to Services     JOURDAN MARTINEZ : Hadii aad ku hadasho Soomaali, waaxda luqadaha, qaybta kaalmada adeegyada, waxay idiin hayaan yecenia gutierrez . So [facility] [phone].    ATENCIÓN: Si habla español, tiene a mariano disposición servicios gratuitos de asistencia lingüística. Llame al [phone].    We comply with applicable federal civil rights laws and [place] laws. We do not discriminate on the basis of race, color, national origin, age, disability, sex, sexual orientation, or gender identity.            Thank you!     Thank you for choosing [facility]  for your care. Our goal is always to provide you with excellent care. Hearing back from our patients is one way we can continue to improve our services. Please take a few minutes to complete the written survey that you may receive in the mail after your visit with us. Thank you!             Your Updated Medication List - Protect others around you: Learn how to safely use, store and throw away your medicines at www.disposemymeds.org.           This list is accurate as of 9/27/18  2:30 PM.  Always use your most recent med list.                   Brand Name Dispense Instructions for use Diagnosis    albuterol 108 (90 Base) MCG/ACT inhaler    VENTOLIN HFA    18 g    Inhale 2 puffs into the lungs every 6 hours (PRN for shortness of breath)    Asthma, intermittent, uncomplicated       atropine 0.1 mg/mL Susp suspension      Take by mouth At Bedtime        B Complex-C Tabs     90 tablet    TAKE ONE TABLET BY MOUTH EVERY DAY    Iron deficiency       CERTAVITE/ANTIOXIDANTS Tabs tablet   Generic drug:  multivitamin, therapeutic with minerals     30 tablet    TAKE ONE TABLET BY MOUTH EVERY DAY    Schizoaffective disorder, unspecified type (H)       Clozapine 200 MG tablet    CLOZARIL     150 mg at breakfast and 650 mg after dinner        DEPEND UNDERGARMENTS Misc     31 each    1 each daily as needed MEDIUM sized briefs    Urinary incontinence, unspecified type       desvenlafaxine succinate 50 MG 24 hr tablet    PRISTIQ     Take 50 mg by mouth        DIPHENDRYL PO      Take 50 mg by mouth 3 times daily as needed        Ferrous Sulfate 324 (65 Fe) MG Tbec     100 tablet    TAKE ONE TABLET BY MOUTH TWICE DAILY    Iron deficiency       HALDOL DECANOATE 100 MG/ML injection   Generic drug:  haloperidol decanoate     1 mL    Inject 100 mg into the muscle every 21 days Received prescription from [facility] signed by Jody Michelle Schoenecker NP Haloperidol decanoate 100mg every 3 weeks. Refills: 5    Paranoid schizophrenia (H)       * HALOPERIDOL PO      Take 5 mg by mouth every 3 hours as needed for agitation (up to three daily)        * HALOPERIDOL PO      Take 10 mg by mouth At Bedtime        levothyroxine 50 MCG tablet    SYNTHROID/LEVOTHROID    90 tablet    TAKE ONE TABLET BY MOUTH EVERY DAY    Hypothyroidism, unspecified type       lithium 300 MG tablet      Take 300 mg by mouth 2 times daily        LORAZEPAM PO      Take 1 mg by mouth every 3 hours as needed  for anxiety        mirabegron 50 MG 24 hr tablet    MYRBETRIQ    30 tablet    Take 1 tablet (50 mg) by mouth daily    Mixed incontinence urge and stress (male)(female)       * nicotine polacrilex 2 MG gum    NICORETTE STARTER KIT    100 tablet    Place 1 each (2 mg) inside cheek as needed for smoking cessation As needed every 1-2 hours    Cigarette nicotine dependence with nicotine-induced disorder       * nicotine polacrilex 2 MG gum    NICORETTE STARTER KIT    90 tablet    Place 1 each (2 mg) inside cheek as needed for smoking cessation    Cigarette nicotine dependence with nicotine-induced disorder       * order for DME     1 each    INCONTINENT PRODUCTS (UP / MONTH)    Chronic constipation, Schizophrenia, unspecified type (H), Schizoaffective disorder, unspecified type (H), Disturbance of conduct, Extrapyramidal symptom, Urinary incontinence, unspecified type, Bipolar affective disorder, remission status unspecified (H)       * order for DME     1 each    GLOVES    Chronic constipation, Schizophrenia, unspecified type (H), Schizoaffective disorder, unspecified type (H), Disturbance of conduct, Extrapyramidal symptom, Urinary incontinence, unspecified type, Bipolar affective disorder, remission status unspecified (H)       * order for DME     1 each    INCONTINENT PERSONAL WIPES    Chronic constipation, Schizophrenia, unspecified type (H), Schizoaffective disorder, unspecified type (H), Disturbance of conduct, Extrapyramidal symptom, Urinary incontinence, unspecified type, Bipolar affective disorder, remission status unspecified (H)       * order for DME     1 each    CHUX PADS    Chronic constipation, Schizophrenia, unspecified type (H), Schizoaffective disorder, unspecified type (H), Disturbance of conduct, Extrapyramidal symptom, Urinary incontinence, unspecified type, Bipolar affective disorder, remission status unspecified (H)       pantoprazole 20 MG EC tablet    PROTONIX    30 tablet    TAKE ONE  TABLET BY MOUTH EVERY DAY    Gastroesophageal reflux disease without esophagitis       polyethylene glycol powder    MIRALAX/GLYCOLAX    527 g    Take 17 g (1 capful) by mouth daily    Chronic constipation       sennosides 8.6 MG tablet    SENOKOT    200 tablet    TAKE THREE TABLETS BY MOUTH TWICE DAILY    Chronic constipation       SF 5000 PLUS 1.1 % Crea   Generic drug:  Sodium Fluoride      Apply to affected area At Bedtime        simethicone 80 MG chewable tablet    MYLICON    180 tablet    Take 1 tablet (80 mg) by mouth every 6 hours as needed for flatulence or cramping PRN    Flatulence, eructation and gas pain       tolterodine 4 MG 24 hr capsule    DETROL LA    30 capsule    Take 1 capsule (4 mg) by mouth daily    Mixed incontinence urge and stress (male)(female)       TRAZODONE HCL PO      Take 200 mg by mouth At Bedtime        * Notice:  This list has 8 medication(s) that are the same as other medications prescribed for you. Read the directions carefully, and ask your doctor or other care provider to review them with you.

## 2018-09-28 DIAGNOSIS — N39.46 MIXED INCONTINENCE URGE AND STRESS (MALE)(FEMALE): ICD-10-CM

## 2018-09-28 NOTE — TELEPHONE ENCOUNTER
Reason for Call:  Medication or medication refill:    Do you use a [facility]?  Name of the pharmacy and phone number for the current request:  [facility] (Ph: [phone]; Fax: [phone])    Name of the medication requested: Myrbetriq ER    Other request:   LAST REFILL: 08/29/2018  LOV: 08/28/2018    Can we leave a detailed message on this number? Not Applicable    Phone number patient can be reached at: Home number on file [phone] (home)    Best Time: NA    Call taken on 9/28/2018 at 3:54 PM by Denise Behrendt

## 2018-09-30 RX ORDER — MIRABEGRON 50 MG/1
50 TABLET, EXTENDED RELEASE ORAL DAILY
Qty: 30 TABLET | Refills: 0 | Status: SHIPPED | OUTPATIENT
Start: 2018-09-30 | End: 2019-07-30

## 2018-10-09 ENCOUNTER — OFFICE VISIT (OUTPATIENT)
Dept: UROLOGY | Facility: CLINIC | Age: 48
End: 2018-10-09
Payer: MEDICARE

## 2018-10-09 VITALS
TEMPERATURE: 97.9 F | RESPIRATION RATE: 12 BRPM | DIASTOLIC BLOOD PRESSURE: 71 MMHG | SYSTOLIC BLOOD PRESSURE: 109 MMHG | HEART RATE: 90 BPM

## 2018-10-09 DIAGNOSIS — F20.0 PARANOID SCHIZOPHRENIA, SUBCHRONIC CONDITION WITH ACUTE EXACERBATION (H): ICD-10-CM

## 2018-10-09 DIAGNOSIS — Z79.899 HIGH RISK MEDICATION USE: ICD-10-CM

## 2018-10-09 DIAGNOSIS — R32 URINARY INCONTINENCE, UNSPECIFIED TYPE: Primary | ICD-10-CM

## 2018-10-09 LAB
ALBUMIN UR-MCNC: NEGATIVE MG/DL
APPEARANCE UR: CLEAR
BASOPHILS # BLD AUTO: 0 10E9/L (ref 0–0.2)
BASOPHILS NFR BLD AUTO: 0.4 %
BILIRUB UR QL STRIP: NEGATIVE
COLOR UR AUTO: YELLOW
DIFFERENTIAL METHOD BLD: ABNORMAL
EOSINOPHIL # BLD AUTO: 0 10E9/L (ref 0–0.7)
EOSINOPHIL NFR BLD AUTO: 0 %
ERYTHROCYTE [DISTWIDTH] IN BLOOD BY AUTOMATED COUNT: 13.7 % (ref 10–15)
GLUCOSE UR STRIP-MCNC: NEGATIVE MG/DL
HCT VFR BLD AUTO: 38.8 % (ref 35–47)
HGB BLD-MCNC: 12.5 G/DL (ref 11.7–15.7)
HGB UR QL STRIP: NEGATIVE
KETONES UR STRIP-MCNC: NEGATIVE MG/DL
LEUKOCYTE ESTERASE UR QL STRIP: NEGATIVE
LYMPHOCYTES # BLD AUTO: 1.8 10E9/L (ref 0.8–5.3)
LYMPHOCYTES NFR BLD AUTO: 22.6 %
MCH RBC QN AUTO: 33.5 PG (ref 26.5–33)
MCHC RBC AUTO-ENTMCNC: 32.2 G/DL (ref 31.5–36.5)
MCV RBC AUTO: 104 FL (ref 78–100)
MONOCYTES # BLD AUTO: 0.5 10E9/L (ref 0–1.3)
MONOCYTES NFR BLD AUTO: 6.4 %
NEUTROPHILS # BLD AUTO: 5.6 10E9/L (ref 1.6–8.3)
NEUTROPHILS NFR BLD AUTO: 70.6 %
NITRATE UR QL: NEGATIVE
PH UR STRIP: 6 PH (ref 5–7)
PLATELET # BLD AUTO: 363 10E9/L (ref 150–450)
RBC # BLD AUTO: 3.73 10E12/L (ref 3.8–5.2)
SOURCE: NORMAL
SP GR UR STRIP: >1.03 (ref 1–1.03)
UROBILINOGEN UR STRIP-ACNC: 0.2 EU/DL (ref 0.2–1)
WBC # BLD AUTO: 7.9 10E9/L (ref 4–11)

## 2018-10-09 PROCEDURE — 99213 OFFICE O/P EST LOW 20 MIN: CPT | Performed by: UROLOGY

## 2018-10-09 PROCEDURE — 81003 URINALYSIS AUTO W/O SCOPE: CPT | Performed by: UROLOGY

## 2018-10-09 PROCEDURE — 36415 COLL VENOUS BLD VENIPUNCTURE: CPT | Performed by: NURSE PRACTITIONER

## 2018-10-09 PROCEDURE — 80159 DRUG ASSAY CLOZAPINE: CPT | Mod: 90 | Performed by: NURSE PRACTITIONER

## 2018-10-09 PROCEDURE — 85025 COMPLETE CBC W/AUTO DIFF WBC: CPT | Performed by: NURSE PRACTITIONER

## 2018-10-09 RX ORDER — SOLIFENACIN SUCCINATE 5 MG/1
5 TABLET, FILM COATED ORAL DAILY
Qty: 30 TABLET | Refills: 1 | Status: SHIPPED | OUTPATIENT
Start: 2018-10-09 | End: 2018-11-26

## 2018-10-09 NOTE — PROGRESS NOTES
"F/u OAB wet, severe; Myrbetriq 50; psychosis, tobacco, other medical issues  (Presents again with her caregiver)    Compliant    Pt reports being \"a little\" better, with some reduction in urge. Still bothersome, especially at work.     Denies dysuria, gross hematuria. Voids about q hr day and nite. Drinks 2 [place], occas soda, otherwise moderate.        Current Outpatient Prescriptions   Medication     albuterol (VENTOLIN HFA) 108 (90 Base) MCG/ACT Inhaler     atropine 0.1 mg/mL     B Complex-C TABS     CERTAVITE/ANTIOXIDANTS TABS tablet     cloZAPine (CLOZARIL) 200 MG tablet     desvenlafaxine succinate (PRISTIQ) 50 MG 24 hr tablet     DiphenhydrAMINE HCl (DIPHENDRYL PO)     Ferrous Sulfate 324 (65 Fe) MG TBEC     haloperidol decanoate (HALDOL DECANOATE) 100 MG/ML injection     HALOPERIDOL PO     HALOPERIDOL PO     Incontinence Supply Disposable (DEPEND UNDERGARMENTS) MISC     levothyroxine (SYNTHROID/LEVOTHROID) 50 MCG tablet     lithium 300 MG tablet     LORAZEPAM PO     mirabegron (MYRBETRIQ) 50 MG 24 hr tablet     nicotine polacrilex (NICORETTE STARTER KIT) 2 MG gum     nicotine polacrilex (NICORETTE STARTER KIT) 2 MG gum     order for DME     order for DME     order for DME     order for DME     pantoprazole (PROTONIX) 20 MG EC tablet     polyethylene glycol (MIRALAX/GLYCOLAX) powder     sennosides (SENOKOT) 8.6 MG tablet     simethicone (MYLICON) 80 MG chewable tablet     Sodium Fluoride (SF 5000 PLUS) 1.1 % CREA     tolterodine (DETROL LA) 4 MG 24 hr capsule     TRAZODONE HCL PO     No current facility-administered medications for this visit.          Results for orders placed or performed in visit on 10/09/18   UA reflex to Microscopic and Culture [[id number]]   Result Value Ref Range    Color Urine Yellow     Appearance Urine Clear     Glucose Urine Negative NEG^Negative mg/dL    Bilirubin Urine Negative NEG^Negative    Ketones Urine Negative NEG^Negative mg/dL    Specific Gravity Urine >1.030 1.003 - 1.035    " Blood Urine Negative NEG^Negative    pH Urine 6.0 5.0 - 7.0 pH    Protein Albumin Urine Negative NEG^Negative mg/dL    Urobilinogen Urine 0.2 0.2 - 1.0 EU/dL    Nitrite Urine Negative NEG^Negative    Leukocyte Esterase Urine Negative NEG^Negative    Source Midstream Urine        IMP:  1. Complex OAB, failed Myrbetriq  2. Other significant medical issues        PLAN:  1. Discussed situation with patient in detail.  2. Consider trial Vesicare 5; discussed in detail; pt elects trial  3. Team will keep record of voiding when she is at home  4. RTC 2 mos to review progress  5. Set realistic expectations  6. 15 minutes spent with patient, more than 50% spent in counseling and coordination of care for OAB

## 2018-10-09 NOTE — MR AVS SNAPSHOT
After Visit Summary   10/9/2018    Doreen Gramajo    MRN: [id number]           Patient Information     Date Of Birth          1970        Visit Information        Provider Department      10/9/2018 11:45 AM Ciro Wang MD [facility]        Today's Diagnoses     Urinary incontinence, unspecified type    -  1       Follow-ups after your visit        Follow-up notes from your care team     Return in about 2 months (around 12/9/2018).      Your next 10 appointments already scheduled     Oct 23, 2018  9:15 AM CDT   Nurse Only with [place] PI CMA/LPN   [facility]    [address] [address]   [phone]            Dec 11, 2018 11:45 AM CST   Return Visit with Ciro Wang MD   [facility] ([facility]    [address] [id number] [phone]              Who to contact     If you have questions or need follow up information about today's clinic visit or your schedule please contact [facility] directly at [phone].  Normal or non-critical lab and imaging results will be communicated to you by MyChart, letter or phone within 4 business days after the clinic has received the results. If you do not hear from us within 7 days, please contact the clinic through MyChart or phone. If you have a critical or abnormal lab result, we will notify you by phone as soon as possible.  Submit refill requests through [employer] or call your pharmacy and they will forward the refill request to us. Please allow 3 business days for your refill to be completed.          Additional Information About Your Visit        Care EveryWhere ID     This is your Care EveryWhere ID. This could be used by other organizations to access your [place] medical records  [id number]        Your Vitals Were     Pulse Temperature Respirations             90 97.9  F (36.6  C) (Oral) 12           Blood Pressure from Last 3 Encounters:   10/09/18 109/71   08/28/18 96/69   07/24/18 122/66    Weight from Last 3 Encounters:   08/28/18 64 kg (141 lb)   07/24/18 64 kg (141 lb)   06/20/18 64.4 kg (142 lb)              We Performed the Following     UA reflex to Microscopic and Culture [[id number]]          Today's Medication Changes          These changes are accurate as of 10/9/18  1:09 PM.  If you have any questions, ask your nurse or doctor.               Start taking these medicines.        Dose/Directions    solifenacin 5 MG tablet   Commonly known as:  VESICARE   Used for:  Urinary incontinence, unspecified type   Started by:  Ciro Wang MD        Dose:  5 mg   Take 1 tablet (5 mg) by mouth daily for 30 doses   Quantity:  30 tablet   Refills:  1         These medicines have changed or have updated prescriptions.        Dose/Directions    pantoprazole 20 MG EC tablet   Commonly known as:  PROTONIX   This may have changed:  See the new instructions.   Used for:  Gastroesophageal reflux disease without esophagitis        TAKE ONE TABLET BY MOUTH EVERY DAY   Quantity:  30 tablet   Refills:  11            Where to get your medicines      These medications were sent to [facility] [address]  [address] [address]    Hours:  test rx sent successfully  2/8/05   Phone:  [phone]     solifenacin 5 MG tablet                Primary Care Provider Office Phone # Fax #    Josey Bonilla, [facility] [phone]       [address] [place], [place] [address]        Equal Access to Services     [facility] HospitalIV[facility] AH: Hadkeegan silvao Sobethany, waaxda luqadaha, qaybta kaalmada adeegyada, alise giles. So [facility] [phone].    ATENCIÓN: Si habla español, tiene a mariano disposición servicios gratuitos de asistencia lingüística. Llame al [phone].    We comply with applicable federal civil rights laws and  [place] laws. We do not discriminate on the basis of race, color, national origin, age, disability, sex, sexual orientation, or gender identity.            Thank you!     Thank you for choosing [facility]  for your care. Our goal is always to provide you with excellent care. Hearing back from our patients is one way we can continue to improve our services. Please take a few minutes to complete the written survey that you may receive in the mail after your visit with us. Thank you!             Your Updated Medication List - Protect others around you: Learn how to safely use, store and throw away your medicines at www.disposemymeds.org.          This list is accurate as of 10/9/18  1:09 PM.  Always use your most recent med list.                   Brand Name Dispense Instructions for use Diagnosis    albuterol 108 (90 Base) MCG/ACT inhaler    VENTOLIN HFA    18 g    Inhale 2 puffs into the lungs every 6 hours (PRN for shortness of breath)    Asthma, intermittent, uncomplicated       atropine 0.1 mg/mL Susp suspension      Take by mouth At Bedtime        B Complex-C Tabs     90 tablet    TAKE ONE TABLET BY MOUTH EVERY DAY    Iron deficiency       CERTAVITE/ANTIOXIDANTS Tabs tablet   Generic drug:  multivitamin, therapeutic with minerals     30 tablet    TAKE ONE TABLET BY MOUTH EVERY DAY    Schizoaffective disorder, unspecified type (H)       Clozapine 200 MG tablet    CLOZARIL     150 mg at breakfast and 650 mg after dinner        DEPEND UNDERGARMENTS Misc     31 each    1 each daily as needed MEDIUM sized briefs    Urinary incontinence, unspecified type       desvenlafaxine succinate 50 MG 24 hr tablet    PRISTIQ     Take 50 mg by mouth daily        DIPHENDRYL PO      Take 50 mg by mouth 3 times daily as needed        Ferrous Sulfate 324 (65 Fe) MG Tbec     100 tablet    TAKE ONE TABLET BY MOUTH TWICE DAILY    Iron deficiency       HALDOL DECANOATE 100 MG/ML injection   Generic drug:  haloperidol  decanoate     1 mL    Inject 100 mg into the muscle every 21 days Received prescription from [place] signed by Jody Michelle Schoenecker NP Haloperidol decanoate 100mg every 3 weeks. Refills: 5    Paranoid schizophrenia (H)       * HALOPERIDOL PO      Take 5 mg by mouth every 3 hours as needed for agitation (up to three daily)        * HALOPERIDOL PO      Take 10 mg by mouth At Bedtime        levothyroxine 50 MCG tablet    SYNTHROID/LEVOTHROID    90 tablet    TAKE ONE TABLET BY MOUTH EVERY DAY    Hypothyroidism, unspecified type       lithium 300 MG tablet      Take 300 mg by mouth 2 times daily        LORAZEPAM PO      Take 1 mg by mouth every 3 hours as needed for anxiety        mirabegron 50 MG 24 hr tablet    MYRBETRIQ    30 tablet    Take 1 tablet (50 mg) by mouth daily    Mixed incontinence urge and stress (male)(female)       * nicotine polacrilex 2 MG gum    NICORETTE STARTER KIT    100 tablet    Place 1 each (2 mg) inside cheek as needed for smoking cessation As needed every 1-2 hours    Cigarette nicotine dependence with nicotine-induced disorder       * nicotine polacrilex 2 MG gum    NICORETTE STARTER KIT    90 tablet    Place 1 each (2 mg) inside cheek as needed for smoking cessation    Cigarette nicotine dependence with nicotine-induced disorder       * order for DME     1 each    INCONTINENT PRODUCTS (UP / MONTH)    Chronic constipation, Schizophrenia, unspecified type (H), Schizoaffective disorder, unspecified type (H), Disturbance of conduct, Extrapyramidal symptom, Urinary incontinence, unspecified type, Bipolar affective disorder, remission status unspecified (H)       * order for DME     1 each    GLOVES    Chronic constipation, Schizophrenia, unspecified type (H), Schizoaffective disorder, unspecified type (H), Disturbance of conduct, Extrapyramidal symptom, Urinary incontinence, unspecified type, Bipolar affective disorder, remission status unspecified (H)       * order for DME      1 each    INCONTINENT PERSONAL WIPES    Chronic constipation, Schizophrenia, unspecified type (H), Schizoaffective disorder, unspecified type (H), Disturbance of conduct, Extrapyramidal symptom, Urinary incontinence, unspecified type, Bipolar affective disorder, remission status unspecified (H)       * order for DME     1 each    CHUX PADS    Chronic constipation, Schizophrenia, unspecified type (H), Schizoaffective disorder, unspecified type (H), Disturbance of conduct, Extrapyramidal symptom, Urinary incontinence, unspecified type, Bipolar affective disorder, remission status unspecified (H)       pantoprazole 20 MG EC tablet    PROTONIX    30 tablet    TAKE ONE TABLET BY MOUTH EVERY DAY    Gastroesophageal reflux disease without esophagitis       polyethylene glycol powder    MIRALAX/GLYCOLAX    527 g    Take 17 g (1 capful) by mouth daily    Chronic constipation       sennosides 8.6 MG tablet    SENOKOT    200 tablet    TAKE THREE TABLETS BY MOUTH TWICE DAILY    Chronic constipation       SF 5000 PLUS 1.1 % Crea   Generic drug:  Sodium Fluoride      Apply to affected area At Bedtime        simethicone 80 MG chewable tablet    MYLICON    180 tablet    Take 1 tablet (80 mg) by mouth every 6 hours as needed for flatulence or cramping PRN    Flatulence, eructation and gas pain       solifenacin 5 MG tablet    VESICARE    30 tablet    Take 1 tablet (5 mg) by mouth daily for 30 doses    Urinary incontinence, unspecified type       tolterodine 4 MG 24 hr capsule    DETROL LA    30 capsule    Take 1 capsule (4 mg) by mouth daily    Mixed incontinence urge and stress (male)(female)       TRAZODONE HCL PO      Take 200 mg by mouth At Bedtime        * Notice:  This list has 8 medication(s) that are the same as other medications prescribed for you. Read the directions carefully, and ask your doctor or other care provider to review them with you.

## 2018-10-10 LAB
CLOZAPINE AND METABOLITES TOTAL: 701 NG/ML
CLOZAPINE SERPL-MCNC: 421 NG/ML
CLOZAPINE-N-OXIDE QUANT: <100 NG/ML
NORCLOZAPINE SERPL-MCNC: 280 NG/ML

## 2018-10-16 ENCOUNTER — ALLIED HEALTH/NURSE VISIT (OUTPATIENT)
Dept: FAMILY MEDICINE | Facility: CLINIC | Age: 48
End: 2018-10-16
Payer: MEDICARE

## 2018-10-16 DIAGNOSIS — F20.0 PARANOID SCHIZOPHRENIA (H): Chronic | ICD-10-CM

## 2018-10-16 PROCEDURE — 96372 THER/PROPH/DIAG INJ SC/IM: CPT

## 2018-10-16 PROCEDURE — 99207 ZZC NO CHARGE NURSE ONLY: CPT

## 2018-10-23 ENCOUNTER — TELEPHONE (OUTPATIENT)
Dept: FAMILY MEDICINE | Facility: CLINIC | Age: 48
End: 2018-10-23

## 2018-10-23 NOTE — TELEPHONE ENCOUNTER
Demetrice called from patient's group home. She would like to be called back to discuss this so she can maybe know what her other options are. Please call Demetrice back at [phone].    Meliza [place]

## 2018-10-23 NOTE — TELEPHONE ENCOUNTER
Left message for group home.  Per the new [facility] policy we will no longer be giving injections that are ordered by an outside provider.  Ibeth orders are good through her 11/27/18 appointment.  After that she will need to get her injection somewhere else  Do they have an RN that comes to the group home that can give her her injection.

## 2018-10-26 NOTE — TELEPHONE ENCOUNTER
Spoke with Demetrice at the Group Home.   She will start getting the Haldol injection at [facility] after her appointment on 11/27/18.  Mirna Bryan,

## 2018-11-06 ENCOUNTER — ALLIED HEALTH/NURSE VISIT (OUTPATIENT)
Dept: FAMILY MEDICINE | Facility: CLINIC | Age: 48
End: 2018-11-06
Payer: MEDICARE

## 2018-11-06 DIAGNOSIS — F20.0 PARANOID SCHIZOPHRENIA, SUBCHRONIC CONDITION WITH ACUTE EXACERBATION (H): ICD-10-CM

## 2018-11-06 DIAGNOSIS — F20.0 PARANOID SCHIZOPHRENIA (H): Chronic | ICD-10-CM

## 2018-11-06 DIAGNOSIS — Z79.899 NEED FOR PROPHYLACTIC CHEMOTHERAPY: ICD-10-CM

## 2018-11-06 LAB
BASOPHILS # BLD AUTO: 0 10E9/L (ref 0–0.2)
BASOPHILS NFR BLD AUTO: 0.3 %
DIFFERENTIAL METHOD BLD: ABNORMAL
EOSINOPHIL # BLD AUTO: 0 10E9/L (ref 0–0.7)
EOSINOPHIL NFR BLD AUTO: 0 %
ERYTHROCYTE [DISTWIDTH] IN BLOOD BY AUTOMATED COUNT: 13.5 % (ref 10–15)
HCT VFR BLD AUTO: 39.1 % (ref 35–47)
HGB BLD-MCNC: 12.8 G/DL (ref 11.7–15.7)
LYMPHOCYTES # BLD AUTO: 1.6 10E9/L (ref 0.8–5.3)
LYMPHOCYTES NFR BLD AUTO: 18.5 %
MCH RBC QN AUTO: 33.7 PG (ref 26.5–33)
MCHC RBC AUTO-ENTMCNC: 32.7 G/DL (ref 31.5–36.5)
MCV RBC AUTO: 103 FL (ref 78–100)
MONOCYTES # BLD AUTO: 0.6 10E9/L (ref 0–1.3)
MONOCYTES NFR BLD AUTO: 7.2 %
NEUTROPHILS # BLD AUTO: 6.5 10E9/L (ref 1.6–8.3)
NEUTROPHILS NFR BLD AUTO: 74 %
PLATELET # BLD AUTO: 373 10E9/L (ref 150–450)
RBC # BLD AUTO: 3.8 10E12/L (ref 3.8–5.2)
WBC # BLD AUTO: 8.8 10E9/L (ref 4–11)

## 2018-11-06 PROCEDURE — 99207 ZZC NO CHARGE NURSE ONLY: CPT

## 2018-11-06 PROCEDURE — 80159 DRUG ASSAY CLOZAPINE: CPT | Mod: 90 | Performed by: NURSE PRACTITIONER

## 2018-11-06 PROCEDURE — 85025 COMPLETE CBC W/AUTO DIFF WBC: CPT | Performed by: NURSE PRACTITIONER

## 2018-11-06 PROCEDURE — 36415 COLL VENOUS BLD VENIPUNCTURE: CPT | Performed by: NURSE PRACTITIONER

## 2018-11-06 PROCEDURE — 96372 THER/PROPH/DIAG INJ SC/IM: CPT

## 2018-11-07 LAB
CLOZAPINE AND METABOLITES TOTAL: 763 NG/ML
CLOZAPINE SERPL-MCNC: 463 NG/ML
CLOZAPINE-N-OXIDE QUANT: <100 NG/ML
NORCLOZAPINE SERPL-MCNC: 300 NG/ML

## 2018-11-12 ENCOUNTER — TELEPHONE (OUTPATIENT)
Dept: UROLOGY | Facility: CLINIC | Age: 48
End: 2018-11-12

## 2018-11-12 NOTE — TELEPHONE ENCOUNTER
PLAN:  1. Discussed situation with patient in detail.  2. Consider trial Vesicare 5; discussed in detail; pt elects trial  3. Team will keep record of voiding when she is at home  4. RTC 2 mos to review progress  5. Set realistic expectations  6. 15 minutes spent with patient, more than 50% spent in counseling and coordination of care for OAB    Patient has follow-up appointment made with provider  Called pharmacy-refilled until Dec appointment     Anton KENNEY RN   Specialty Clinics

## 2018-11-12 NOTE — TELEPHONE ENCOUNTER
[facility] in [place], [place] is requesting refill of Vesicare 5 mg #30, not found on meds list

## 2018-11-26 DIAGNOSIS — E61.1 IRON DEFICIENCY: ICD-10-CM

## 2018-11-26 DIAGNOSIS — R32 URINARY INCONTINENCE, UNSPECIFIED TYPE: ICD-10-CM

## 2018-11-26 DIAGNOSIS — K21.9 GASTROESOPHAGEAL REFLUX DISEASE WITHOUT ESOPHAGITIS: ICD-10-CM

## 2018-11-26 NOTE — TELEPHONE ENCOUNTER
Reason for Call:  Medication or medication refill:    Do you use a [facility]?  Name of the pharmacy and phone number for the current request:  [facility] (Ph: [phone])    Name of the medication requested: Vesicare    Other request:   LAST REFILL: 11/21/2018  LOV: 10/09/2018    Can we leave a detailed message on this number? Not Applicable    Phone number patient can be reached at: Home number on file [phone] (home)    Best Time: NA    Call taken on 11/26/2018 at 3:21 PM by Denise Behrendt

## 2018-11-27 ENCOUNTER — ALLIED HEALTH/NURSE VISIT (OUTPATIENT)
Dept: FAMILY MEDICINE | Facility: CLINIC | Age: 48
End: 2018-11-27
Payer: MEDICARE

## 2018-11-27 DIAGNOSIS — F20.0 PARANOID SCHIZOPHRENIA (H): Chronic | ICD-10-CM

## 2018-11-27 PROCEDURE — 96372 THER/PROPH/DIAG INJ SC/IM: CPT

## 2018-11-27 PROCEDURE — 99207 ZZC NO CHARGE NURSE ONLY: CPT

## 2018-11-27 RX ORDER — SOLIFENACIN SUCCINATE 5 MG/1
5 TABLET, FILM COATED ORAL DAILY
Qty: 30 TABLET | Refills: 1 | Status: SHIPPED | OUTPATIENT
Start: 2018-11-27 | End: 2019-12-03

## 2018-11-27 RX ORDER — PANTOPRAZOLE SODIUM 20 MG/1
TABLET, DELAYED RELEASE ORAL
Qty: 30 TABLET | Refills: 5 | Status: SHIPPED | OUTPATIENT
Start: 2018-11-27 | End: 2019-05-28

## 2018-11-27 RX ORDER — FERROUS SULFATE 324(65)MG
TABLET, DELAYED RELEASE (ENTERIC COATED) ORAL
Qty: 100 TABLET | Refills: 1 | Status: SHIPPED | OUTPATIENT
Start: 2018-11-27 | End: 2019-02-19

## 2018-11-27 NOTE — TELEPHONE ENCOUNTER
"Requested Prescriptions   Pending Prescriptions Disp Refills     solifenacin (VESICARE) 5 MG tablet 30 tablet 1     Sig: Take 1 tablet (5 mg) by mouth daily    Muscarinic Antagonists (Urinary Incontinence Agents) Passed    11/26/2018  3:23 PM       Passed - Recent (12 mo) or future (30 days) visit within the authorizing provider's specialty    Patient had office visit in the last 12 months or has a visit in the next 30 days with authorizing provider or within the authorizing provider's specialty.  See \"Patient Info\" tab in inbasket, or \"Choose Columns\" in Meds & Orders section of the refill encounter.             Passed - Patient does not have a diagnosis of glaucoma on the problem list    If glaucoma diagnosis is new, refer refill to physician.         Passed - Patient is 18 years of age or older        Last Written Prescription Date:  7/13/18  Last Fill Quantity: 180,  # refills: 3   Last office visit: 10/9/2018 with prescribing provider:     Future Office Visit:   Next 5 appointments (look out 90 days)     Nov 27, 2018  3:45 PM CST   Nurse Only with [place] PI GREY/LPN   [facility]    [address] [address]   [phone]            Dec 11, 2018 11:45 AM CST   Return Visit with Ciro Wang MD   [facility] ([facility])    [address] [address]   [phone]                 '  "

## 2018-11-27 NOTE — MR AVS SNAPSHOT
After Visit Summary   11/27/2018    Doreen Gramajo    MRN: [id number]           Patient Information     Date Of Birth          1970        Visit Information        Provider Department      11/27/2018 3:45 PM [place] PI GREY/LPN [facility]        Today's Diagnoses     Paranoid schizophrenia (H)          Care Instructions    .          Follow-ups after your visit        Your next 10 appointments already scheduled     Dec 04, 2018  9:30 AM CST   LAB with PI LAB   [facility]    [address] [facility] [facility] [address]   [phone]           Please do not eat 10-12 hours before your appointment if you are coming in fasting for labs on lipids, cholesterol, or glucose (sugar). This does not apply to pregnant women. Water, hot tea and black coffee (with nothing added) are okay. Do not drink other fluids, diet soda or chew gum.            Dec 11, 2018 11:45 AM CST   Return Visit with Ciro Wang MD   [facility]    [address] [id number] [phone]              Who to contact     If you have questions or need follow up information about today's clinic visit or your schedule please contact [facility] directly at [phone].  Normal or non-critical lab and imaging results will be communicated to you by MyChart, letter or phone within 4 business days after the clinic has received the results. If you do not hear from us within 7 days, please contact the clinic through MyChart or phone. If you have a critical or abnormal lab result, we will notify you by phone as soon as possible.  Submit refill requests through [employer] or call your pharmacy and they will forward the refill request to us. Please allow 3 business days for your refill to be completed.          Additional Information About Your Visit        Care EveryWhere ID     This is your Care EveryWhere  ID. This could be used by other organizations to access your [place] medical records  [id number]         Blood Pressure from Last 3 Encounters:   10/09/18 109/71   08/28/18 96/69   07/24/18 122/66    Weight from Last 3 Encounters:   08/28/18 141 lb (64 kg)   07/24/18 141 lb (64 kg)   06/20/18 142 lb (64.4 kg)              We Performed the Following     HALOPERIDOL DECANOATE INJ     THER/PROPH/DIAG INJ, SC/IM        Primary Care Provider Office Phone # Fax #    Josey Bonilla, [facility] [phone]       [address] [address]        Equal Access to Services     JOURDAN MARTINEZ : Hadii maxwell silvao Sobethany, waaxda luqadaha, qaybta kaalmada adeegyada, alise giles. So [facility] [phone].    ATENCIÓN: Si habla español, tiene a mariano disposición servicios gratuitos de asistencia lingüística. Llame al [phone].    We comply with applicable federal civil rights laws and [place] laws. We do not discriminate on the basis of race, color, national origin, age, disability, sex, sexual orientation, or gender identity.            Thank you!     Thank you for choosing [facility]  for your care. Our goal is always to provide you with excellent care. Hearing back from our patients is one way we can continue to improve our services. Please take a few minutes to complete the written survey that you may receive in the mail after your visit with us. Thank you!             Your Updated Medication List - Protect others around you: Learn how to safely use, store and throw away your medicines at www.disposemymeds.org.          This list is accurate as of 11/27/18  4:00 PM.  Always use your most recent med list.                   Brand Name Dispense Instructions for use Diagnosis    albuterol 108 (90 Base) MCG/ACT inhaler    VENTOLIN HFA    18 g    Inhale 2 puffs into the lungs every 6 hours (PRN for shortness of breath)    Asthma, intermittent,  uncomplicated       atropine 0.1 mg/mL Susp suspension      Take by mouth At Bedtime        CERTAVITE/ANTIOXIDANTS tablet   Generic drug:  multivitamin w/minerals     30 tablet    TAKE ONE TABLET BY MOUTH EVERY DAY    Schizoaffective disorder, unspecified type (H)       Clozapine 200 MG tablet    CLOZARIL     150 mg at breakfast and 650 mg after dinner        DEPEND UNDERGARMENTS Misc     31 each    1 each daily as needed MEDIUM sized briefs    Urinary incontinence, unspecified type       desvenlafaxine 50 MG 24 hr tablet    PRISTIQ     Take 50 mg by mouth daily        DIPHENDRYL PO      Take 50 mg by mouth 3 times daily as needed        Ferrous Sulfate 324 (65 Fe) MG Tbec     100 tablet    TAKE ONE TABLET BY MOUTH TWICE DAILY    Iron deficiency       HALDOL DECANOATE 100 MG/ML injection   Generic drug:  haloperidol decanoate     1 mL    Inject 100 mg into the muscle every 21 days Received prescription from [facility] signed by Jody Michelle Schoenecker NP Haloperidol decanoate 100mg every 3 weeks. Refills: 5    Paranoid schizophrenia (H)       * HALOPERIDOL PO      Take 5 mg by mouth every 3 hours as needed for agitation (up to three daily)        * HALOPERIDOL PO      Take 10 mg by mouth At Bedtime        levothyroxine 50 MCG tablet    SYNTHROID/LEVOTHROID    90 tablet    TAKE ONE TABLET BY MOUTH EVERY DAY    Hypothyroidism, unspecified type       lithium 300 MG tablet      Take 300 mg by mouth 2 times daily        LORAZEPAM PO      Take 1 mg by mouth every 3 hours as needed for anxiety        mirabegron 50 MG 24 hr tablet    MYRBETRIQ    30 tablet    Take 1 tablet (50 mg) by mouth daily    Mixed incontinence urge and stress (male)(female)       * nicotine 2 MG gum    NICORETTE STARTER KIT    100 tablet    Place 1 each (2 mg) inside cheek as needed for smoking cessation As needed every 1-2 hours    Cigarette nicotine dependence with nicotine-induced disorder       * nicotine 2 MG gum    NICORETTE STARTER KIT     90 tablet    Place 1 each (2 mg) inside cheek as needed for smoking cessation    Cigarette nicotine dependence with nicotine-induced disorder       * order for DME     1 each    INCONTINENT PRODUCTS (UP / MONTH)    Chronic constipation, Schizophrenia, unspecified type (H), Schizoaffective disorder, unspecified type (H), Disturbance of conduct, Extrapyramidal symptom, Urinary incontinence, unspecified type, Bipolar affective disorder, remission status unspecified (H)       * order for DME     1 each    GLOVES    Chronic constipation, Schizophrenia, unspecified type (H), Schizoaffective disorder, unspecified type (H), Disturbance of conduct, Extrapyramidal symptom, Urinary incontinence, unspecified type, Bipolar affective disorder, remission status unspecified (H)       * order for DME     1 each    INCONTINENT PERSONAL WIPES    Chronic constipation, Schizophrenia, unspecified type (H), Schizoaffective disorder, unspecified type (H), Disturbance of conduct, Extrapyramidal symptom, Urinary incontinence, unspecified type, Bipolar affective disorder, remission status unspecified (H)       * order for DME     1 each    CHUX PADS    Chronic constipation, Schizophrenia, unspecified type (H), Schizoaffective disorder, unspecified type (H), Disturbance of conduct, Extrapyramidal symptom, Urinary incontinence, unspecified type, Bipolar affective disorder, remission status unspecified (H)       pantoprazole 20 MG EC tablet    PROTONIX    30 tablet    TAKE ONE TABLET BY MOUTH EVERY DAY    Gastroesophageal reflux disease without esophagitis       polyethylene glycol powder    MIRALAX/GLYCOLAX    527 g    Take 17 g (1 capful) by mouth daily    Chronic constipation       sennosides 8.6 MG tablet    SENOKOT    200 tablet    TAKE THREE TABLETS BY MOUTH TWICE DAILY    Chronic constipation       SF 5000 PLUS 1.1 % Crea   Generic drug:  Sodium Fluoride      Apply to affected area At Bedtime        simethicone 80 MG chewable tablet     MYLICON    180 tablet    Take 1 tablet (80 mg) by mouth every 6 hours as needed for flatulence or cramping PRN    Flatulence, eructation and gas pain       solifenacin 5 MG tablet    VESICARE    30 tablet    Take 1 tablet (5 mg) by mouth daily    Urinary incontinence, unspecified type       tolterodine ER 4 MG 24 hr capsule    DETROL LA    30 capsule    Take 1 capsule (4 mg) by mouth daily    Mixed incontinence urge and stress (male)(female)       TRAZODONE HCL PO      Take 200 mg by mouth At Bedtime        vitamin tablet    B COMPLEX-C    90 tablet    TAKE ONE TABLET BY MOUTH EVERY DAY    Iron deficiency       * Notice:  This list has 8 medication(s) that are the same as other medications prescribed for you. Read the directions carefully, and ask your doctor or other care provider to review them with you.

## 2018-11-27 NOTE — TELEPHONE ENCOUNTER
"Requested Prescriptions   Pending Prescriptions Disp Refills     Ferrous Sulfate 324 (65 Fe) MG TBEC [Pharmacy Med Name: FERROUS SULFATE 324(65)MG TABLET DR] 100 tablet      Sig: TAKE ONE TABLET BY MOUTH TWICE DAILY    Iron Supplements Passed    11/26/2018  2:15 PM       Passed - Patient is 12 years of age or older       Passed - Recent (12 mo) or future (30 days) visit within the authorizing provider's specialty    Patient had office visit in the last 12 months or has a visit in the next 30 days with authorizing provider or within the authorizing provider's specialty.  See \"Patient Info\" tab in inbasket, or \"Choose Columns\" in Meds & Orders section of the refill encounter.      Last Written Prescription Date:  7/26/18  Last Fill Quantity: 100,  # refills: 1   Last office visit: 11/6/2018 with prescribing provider:     Future Office Visit:   Next 5 appointments (look out 90 days)     Nov 27, 2018  3:45 PM CST   Nurse Only with [place] PI CMA/LPN   [facility] ([facility])    [address] [address]   [phone]            Dec 11, 2018 11:45 AM CST   Return Visit with Ciro Wang MD   [facility] ([facility])    [address] [address] [phone]                            Passed - Hgb OR Hct on record within the past 12 mos.    Patient need only have had a HGB or HCT on file in the past 12 mos. That result does not need to be normal.    Recent Labs   Lab Test  11/06/18   0900  10/09/18   0910  09/04/18   0920   HGB  12.8  12.5  12.5     Recent Labs   Lab Test  11/06/18   0900  10/09/18   0910  09/04/18 0920   HCT  39.1  38.8  38.6       Please verify a HGB or HCT has been checked SINCE THE LAST DOSE CHANGE.            pantoprazole (PROTONIX) 20 MG EC tablet [Pharmacy Med Name: PANTOPRAZOLE SODIUM 20 MG TABLET DR] 30 tablet      Sig: TAKE ONE TABLET BY MOUTH EVERY DAY    PPI Protocol Passed    11/26/2018  " "2:15 PM       Passed - Not on Clopidogrel (unless Pantoprazole ordered)       Passed - No diagnosis of osteoporosis on record       Passed - Recent (12 mo) or future (30 days) visit within the authorizing provider's specialty    Patient had office visit in the last 12 months or has a visit in the next 30 days with authorizing provider or within the authorizing provider's specialty.  See \"Patient Info\" tab in inbasket, or \"Choose Columns\" in Meds & Orders section of the refill encounter.      Last Written Prescription Date:  1/15/18  Last Fill Quantity: 30,  # refills: 11   Last office visit: 11/6/2018 with prescribing provider:     Future Office Visit:   Next 5 appointments (look out 90 days)     Nov 27, 2018  3:45 PM CST   Nurse Only with [place] PI GREY/LPN   [facility] ([facility])    [address] [address]   [phone]            Dec 11, 2018 11:45 AM CST   Return Visit with Ciro Wang MD   [facility]    [address] [place] [address]   [phone]                            Passed - Patient is age 18 or older       Passed - No active pregnacy on record       Passed - No positive pregnancy test in past 12 months          "

## 2018-12-04 DIAGNOSIS — F20.0 PARANOID SCHIZOPHRENIA, SUBCHRONIC CONDITION WITH ACUTE EXACERBATION (H): ICD-10-CM

## 2018-12-04 DIAGNOSIS — Z79.899 NEED FOR PROPHYLACTIC CHEMOTHERAPY: ICD-10-CM

## 2018-12-04 LAB
BASOPHILS # BLD AUTO: 0 10E9/L (ref 0–0.2)
BASOPHILS NFR BLD AUTO: 0.3 %
DIFFERENTIAL METHOD BLD: ABNORMAL
EOSINOPHIL # BLD AUTO: 0 10E9/L (ref 0–0.7)
EOSINOPHIL NFR BLD AUTO: 0 %
ERYTHROCYTE [DISTWIDTH] IN BLOOD BY AUTOMATED COUNT: 13.4 % (ref 10–15)
HCT VFR BLD AUTO: 38 % (ref 35–47)
HGB BLD-MCNC: 12.2 G/DL (ref 11.7–15.7)
LYMPHOCYTES # BLD AUTO: 2.1 10E9/L (ref 0.8–5.3)
LYMPHOCYTES NFR BLD AUTO: 22.8 %
MCH RBC QN AUTO: 33.3 PG (ref 26.5–33)
MCHC RBC AUTO-ENTMCNC: 32.1 G/DL (ref 31.5–36.5)
MCV RBC AUTO: 104 FL (ref 78–100)
MONOCYTES # BLD AUTO: 0.8 10E9/L (ref 0–1.3)
MONOCYTES NFR BLD AUTO: 8.5 %
NEUTROPHILS # BLD AUTO: 6.2 10E9/L (ref 1.6–8.3)
NEUTROPHILS NFR BLD AUTO: 68.4 %
PLATELET # BLD AUTO: 378 10E9/L (ref 150–450)
RBC # BLD AUTO: 3.66 10E12/L (ref 3.8–5.2)
WBC # BLD AUTO: 9.1 10E9/L (ref 4–11)

## 2018-12-04 PROCEDURE — 36415 COLL VENOUS BLD VENIPUNCTURE: CPT | Performed by: NURSE PRACTITIONER

## 2018-12-04 PROCEDURE — 80159 DRUG ASSAY CLOZAPINE: CPT | Mod: 90 | Performed by: NURSE PRACTITIONER

## 2018-12-04 PROCEDURE — 85025 COMPLETE CBC W/AUTO DIFF WBC: CPT | Performed by: NURSE PRACTITIONER

## 2018-12-05 LAB
CLOZAPINE AND METABOLITES TOTAL: 1438 NG/ML
CLOZAPINE SERPL-MCNC: 787 NG/ML
CLOZAPINE-N-OXIDE QUANT: 125 NG/ML
NORCLOZAPINE SERPL-MCNC: 526 NG/ML

## 2018-12-11 ENCOUNTER — OFFICE VISIT (OUTPATIENT)
Dept: UROLOGY | Facility: CLINIC | Age: 48
End: 2018-12-11
Payer: MEDICARE

## 2018-12-11 VITALS
HEIGHT: 62 IN | SYSTOLIC BLOOD PRESSURE: 106 MMHG | WEIGHT: 144 LBS | DIASTOLIC BLOOD PRESSURE: 71 MMHG | HEART RATE: 97 BPM | BODY MASS INDEX: 26.5 KG/M2

## 2018-12-11 DIAGNOSIS — N32.81 OAB (OVERACTIVE BLADDER): Primary | ICD-10-CM

## 2018-12-11 LAB
ALBUMIN UR-MCNC: NEGATIVE MG/DL
APPEARANCE UR: CLEAR
BILIRUB UR QL STRIP: NEGATIVE
COLOR UR AUTO: YELLOW
GLUCOSE UR STRIP-MCNC: NEGATIVE MG/DL
HGB UR QL STRIP: NEGATIVE
KETONES UR STRIP-MCNC: NEGATIVE MG/DL
LEUKOCYTE ESTERASE UR QL STRIP: NEGATIVE
NITRATE UR QL: NEGATIVE
PH UR STRIP: 6.5 PH (ref 5–7)
SOURCE: NORMAL
SP GR UR STRIP: 1.01 (ref 1–1.03)
UROBILINOGEN UR STRIP-ACNC: 0.2 EU/DL (ref 0.2–1)

## 2018-12-11 PROCEDURE — 99213 OFFICE O/P EST LOW 20 MIN: CPT | Performed by: UROLOGY

## 2018-12-11 PROCEDURE — 81003 URINALYSIS AUTO W/O SCOPE: CPT | Performed by: UROLOGY

## 2018-12-11 RX ORDER — SOLIFENACIN SUCCINATE 5 MG/1
5 TABLET, FILM COATED ORAL DAILY
Qty: 90 TABLET | Refills: 3 | Status: SHIPPED | OUTPATIENT
Start: 2018-12-11 | End: 2019-12-03

## 2018-12-11 ASSESSMENT — MIFFLIN-ST. JEOR: SCORE: 1236.43

## 2018-12-11 NOTE — PROGRESS NOTES
F/u OAB wet, severe; Vesicare 5 after failing Myrbetriq; psychosis, tobacco, other medical issues  (Presents again with her caregiver)    Control has improved as pt can now ride longer in car with less urge, also more time to get to the restroom.    Still some noc and noc enuresis but significantly improved.    Pt has moderated fluids and reduced caffeine; pleased with her success.    Doreen has also quit smoking; congratulations offered      Results for orders placed or performed in visit on 12/11/18   UA reflex to Microscopic and Culture [[id number]]   Result Value Ref Range    Color Urine Yellow     Appearance Urine Clear     Glucose Urine Negative NEG^Negative mg/dL    Bilirubin Urine Negative NEG^Negative    Ketones Urine Negative NEG^Negative mg/dL    Specific Gravity Urine 1.015 1.003 - 1.035    Blood Urine Negative NEG^Negative    pH Urine 6.5 5.0 - 7.0 pH    Protein Albumin Urine Negative NEG^Negative mg/dL    Urobilinogen Urine 0.2 0.2 - 1.0 EU/dL    Nitrite Urine Negative NEG^Negative    Leukocyte Esterase Urine Negative NEG^Negative    Source Midstream Urine            IMP:  1. Complex OAB, improving on behavioral mod + Vesicare 5      PLAN:  1. Discussed situation with patient in detail.  2. Continue behavioral mod + Ves 5  3. RTC 1 yr or sooner prn  4. 15 minutes spent with patient, more than 50% spent in counseling and coordination of care for OAB.

## 2018-12-17 DIAGNOSIS — F20.9 SCHIZOPHRENIA (H): Primary | ICD-10-CM

## 2018-12-26 DIAGNOSIS — K59.09 CHRONIC CONSTIPATION: ICD-10-CM

## 2018-12-26 NOTE — TELEPHONE ENCOUNTER
Requested Prescriptions   Pending Prescriptions Disp Refills     sennosides (SENOKOT) 8.6 MG tablet [Pharmacy Med Name: SENNA 8.6 MG TABLET] 200 tablet 11     Sig: TAKE THREE TABLETS BY MOUTH TWICE DAILY    There is no refill protocol information for this order        Last Written Prescription Date:  1/15/18  Last Fill Quantity: 200,  # refills: 11   Last office visit: 11/27/2018 with prescribing provider:     Future Office Visit:

## 2018-12-28 RX ORDER — SENNOSIDES 8.6 MG
TABLET ORAL
Qty: 200 TABLET | Refills: 5 | Status: SHIPPED | OUTPATIENT
Start: 2018-12-28 | End: 2019-06-24

## 2019-01-02 DIAGNOSIS — F20.9 SCHIZOPHRENIA (H): ICD-10-CM

## 2019-01-02 DIAGNOSIS — Z79.899 NEED FOR PROPHYLACTIC CHEMOTHERAPY: ICD-10-CM

## 2019-01-02 DIAGNOSIS — F20.0 PARANOID SCHIZOPHRENIA, SUBCHRONIC CONDITION WITH ACUTE EXACERBATION (H): ICD-10-CM

## 2019-01-02 LAB
BASOPHILS # BLD AUTO: 0 10E9/L (ref 0–0.2)
BASOPHILS NFR BLD AUTO: 0.3 %
DIFFERENTIAL METHOD BLD: ABNORMAL
EOSINOPHIL # BLD AUTO: 0 10E9/L (ref 0–0.7)
EOSINOPHIL NFR BLD AUTO: 0.1 %
ERYTHROCYTE [DISTWIDTH] IN BLOOD BY AUTOMATED COUNT: 12.9 % (ref 10–15)
HCT VFR BLD AUTO: 40.6 % (ref 35–47)
HGB BLD-MCNC: 13 G/DL (ref 11.7–15.7)
LYMPHOCYTES # BLD AUTO: 2.7 10E9/L (ref 0.8–5.3)
LYMPHOCYTES NFR BLD AUTO: 30.5 %
MCH RBC QN AUTO: 33.1 PG (ref 26.5–33)
MCHC RBC AUTO-ENTMCNC: 32 G/DL (ref 31.5–36.5)
MCV RBC AUTO: 103 FL (ref 78–100)
MONOCYTES # BLD AUTO: 0.6 10E9/L (ref 0–1.3)
MONOCYTES NFR BLD AUTO: 6.6 %
NEUTROPHILS # BLD AUTO: 5.6 10E9/L (ref 1.6–8.3)
NEUTROPHILS NFR BLD AUTO: 62.5 %
PLATELET # BLD AUTO: 389 10E9/L (ref 150–450)
RBC # BLD AUTO: 3.93 10E12/L (ref 3.8–5.2)
WBC # BLD AUTO: 9 10E9/L (ref 4–11)

## 2019-01-02 PROCEDURE — 80159 DRUG ASSAY CLOZAPINE: CPT | Mod: 90 | Performed by: NURSE PRACTITIONER

## 2019-01-02 PROCEDURE — 36415 COLL VENOUS BLD VENIPUNCTURE: CPT | Performed by: NURSE PRACTITIONER

## 2019-01-02 PROCEDURE — 85025 COMPLETE CBC W/AUTO DIFF WBC: CPT | Performed by: NURSE PRACTITIONER

## 2019-01-03 LAB
CLOZAPINE AND METABOLITES TOTAL: 1246 NG/ML
CLOZAPINE SERPL-MCNC: 744 NG/ML
CLOZAPINE-N-OXIDE QUANT: 105 NG/ML
NORCLOZAPINE SERPL-MCNC: 397 NG/ML

## 2019-01-29 DIAGNOSIS — Z79.899 NEED FOR PROPHYLACTIC CHEMOTHERAPY: ICD-10-CM

## 2019-01-29 DIAGNOSIS — F20.9 SCHIZOPHRENIA (H): ICD-10-CM

## 2019-01-29 DIAGNOSIS — Z79.899 LONG TERM USE OF DRUG: Primary | ICD-10-CM

## 2019-01-29 DIAGNOSIS — F20.0 PARANOID SCHIZOPHRENIA, SUBCHRONIC CONDITION WITH ACUTE EXACERBATION (H): ICD-10-CM

## 2019-01-29 LAB
BASOPHILS # BLD AUTO: 0 10E9/L (ref 0–0.2)
BASOPHILS NFR BLD AUTO: 0.3 %
DIFFERENTIAL METHOD BLD: ABNORMAL
EOSINOPHIL # BLD AUTO: 0 10E9/L (ref 0–0.7)
EOSINOPHIL NFR BLD AUTO: 0 %
ERYTHROCYTE [DISTWIDTH] IN BLOOD BY AUTOMATED COUNT: 13.8 % (ref 10–15)
HCT VFR BLD AUTO: 37.9 % (ref 35–47)
HGB BLD-MCNC: 12.2 G/DL (ref 11.7–15.7)
LYMPHOCYTES # BLD AUTO: 2.7 10E9/L (ref 0.8–5.3)
LYMPHOCYTES NFR BLD AUTO: 31 %
MCH RBC QN AUTO: 33.5 PG (ref 26.5–33)
MCHC RBC AUTO-ENTMCNC: 32.2 G/DL (ref 31.5–36.5)
MCV RBC AUTO: 104 FL (ref 78–100)
MONOCYTES # BLD AUTO: 0.5 10E9/L (ref 0–1.3)
MONOCYTES NFR BLD AUTO: 5.3 %
NEUTROPHILS # BLD AUTO: 5.5 10E9/L (ref 1.6–8.3)
NEUTROPHILS NFR BLD AUTO: 63.4 %
PLATELET # BLD AUTO: 354 10E9/L (ref 150–450)
RBC # BLD AUTO: 3.64 10E12/L (ref 3.8–5.2)
WBC # BLD AUTO: 8.6 10E9/L (ref 4–11)

## 2019-01-29 PROCEDURE — 36415 COLL VENOUS BLD VENIPUNCTURE: CPT | Performed by: NURSE PRACTITIONER

## 2019-01-29 PROCEDURE — 85025 COMPLETE CBC W/AUTO DIFF WBC: CPT | Performed by: NURSE PRACTITIONER

## 2019-01-29 PROCEDURE — 80159 DRUG ASSAY CLOZAPINE: CPT | Mod: 90 | Performed by: NURSE PRACTITIONER

## 2019-01-30 LAB
CLOZAPINE AND METABOLITES TOTAL: 1244 NG/ML
CLOZAPINE SERPL-MCNC: 705 NG/ML
CLOZAPINE-N-OXIDE QUANT: 109 NG/ML
NORCLOZAPINE SERPL-MCNC: 430 NG/ML

## 2019-02-19 DIAGNOSIS — E61.1 IRON DEFICIENCY: ICD-10-CM

## 2019-02-19 DIAGNOSIS — F25.9 SCHIZOAFFECTIVE DISORDER, UNSPECIFIED TYPE (H): Chronic | ICD-10-CM

## 2019-02-19 RX ORDER — FERROUS SULFATE 324(65)MG
TABLET, DELAYED RELEASE (ENTERIC COATED) ORAL
Qty: 100 TABLET | Refills: 3 | Status: SHIPPED | OUTPATIENT
Start: 2019-02-19 | End: 2019-09-18

## 2019-02-19 RX ORDER — FOLIC ACID/MV,IRON,MIN/LUTEIN 0.4-18-25
TABLET ORAL
Qty: 30 TABLET | Refills: 5 | Status: SHIPPED | OUTPATIENT
Start: 2019-02-19 | End: 2019-09-18

## 2019-02-19 NOTE — TELEPHONE ENCOUNTER
"Requested Prescriptions   Pending Prescriptions Disp Refills     Ferrous Sulfate 324 (65 Fe) MG TBEC [Pharmacy Med Name: FERROUS SULFATE 324(65)MG TABLET DR] 100 tablet 1     Sig: TAKE ONE TABLET BY MOUTH TWICE DAILY    Iron Supplements Passed - 2/19/2019 10:41 AM       Passed - Patient is 12 years of age or older       Passed - Recent (12 mo) or future (30 days) visit within the authorizing provider's specialty    Patient had office visit in the last 12 months or has a visit in the next 30 days with authorizing provider or within the authorizing provider's specialty.  See \"Patient Info\" tab in inbasket, or \"Choose Columns\" in Meds & Orders section of the refill encounter.      Last Written Prescription Date:  11/27/18  Last Fill Quantity: 100,  # refills: 1   Last office visit: 11/27/2018 with prescribing provider:     Future Office Visit:               Passed - Hgb OR Hct on record within the past 12 mos.    Patient need only have had a HGB or HCT on file in the past 12 mos. That result does not need to be normal.    Recent Labs   Lab Test 01/29/19  0910 01/02/19  0750 12/04/18  0935   HGB 12.2 13.0 12.2     Recent Labs   Lab Test 01/29/19  0910 01/02/19  0750 12/04/18  0935   HCT 37.9 40.6 38.0       Please verify a HGB or HCT has been checked SINCE THE LAST DOSE CHANGE.           Passed - Medication is active on med list        CERTAVITE/ANTIOXIDANTS tablet [Pharmacy Med Name: CERTAVITE-ANTIOXIDANT 18MG-0.4MG TABLET] 30 tablet 5     Sig: TAKE ONE TABLET BY MOUTH EVERY DAY    Vitamin Supplements (Adult) Protocol Passed - 2/19/2019 10:41 AM       Passed - High dose Vitamin D not ordered       Passed - Normal Hgb on file in past 12 mos    Recent Labs   Lab Test 01/29/19  0910   HGB 12.2              Passed - Recent (12 mo) or future (30 days) visit within the authorizing provider's specialty    Patient had office visit in the last 12 months or has a visit in the next 30 days with authorizing provider or within the " "authorizing provider's specialty.  See \"Patient Info\" tab in inbasket, or \"Choose Columns\" in Meds & Orders section of the refill encounter.      Last Written Prescription Date:  9/5/18  Last Fill Quantity: 30,  # refills: 5   Last office visit: 11/27/2018 with prescribing provider:     Future Office Visit:               Passed - Medication is active on med list          "

## 2019-02-27 DIAGNOSIS — Z79.899 LONG TERM USE OF DRUG: ICD-10-CM

## 2019-02-27 DIAGNOSIS — F20.9 SCHIZOPHRENIA (H): ICD-10-CM

## 2019-02-27 DIAGNOSIS — F20.0 PARANOID SCHIZOPHRENIA, SUBCHRONIC CONDITION WITH ACUTE EXACERBATION (H): ICD-10-CM

## 2019-02-27 LAB
BASOPHILS # BLD AUTO: 0 10E9/L (ref 0–0.2)
BASOPHILS NFR BLD AUTO: 0.4 %
DIFFERENTIAL METHOD BLD: ABNORMAL
EOSINOPHIL # BLD AUTO: 0 10E9/L (ref 0–0.7)
EOSINOPHIL NFR BLD AUTO: 0.1 %
ERYTHROCYTE [DISTWIDTH] IN BLOOD BY AUTOMATED COUNT: 13.6 % (ref 10–15)
HCT VFR BLD AUTO: 36.7 % (ref 35–47)
HGB BLD-MCNC: 12.3 G/DL (ref 11.7–15.7)
LYMPHOCYTES # BLD AUTO: 2.4 10E9/L (ref 0.8–5.3)
LYMPHOCYTES NFR BLD AUTO: 33.1 %
MCH RBC QN AUTO: 34.6 PG (ref 26.5–33)
MCHC RBC AUTO-ENTMCNC: 33.5 G/DL (ref 31.5–36.5)
MCV RBC AUTO: 103 FL (ref 78–100)
MONOCYTES # BLD AUTO: 0.5 10E9/L (ref 0–1.3)
MONOCYTES NFR BLD AUTO: 7.1 %
NEUTROPHILS # BLD AUTO: 4.4 10E9/L (ref 1.6–8.3)
NEUTROPHILS NFR BLD AUTO: 59.3 %
PLATELET # BLD AUTO: 372 10E9/L (ref 150–450)
RBC # BLD AUTO: 3.56 10E12/L (ref 3.8–5.2)
WBC # BLD AUTO: 7.4 10E9/L (ref 4–11)

## 2019-02-27 PROCEDURE — 36415 COLL VENOUS BLD VENIPUNCTURE: CPT | Performed by: NURSE PRACTITIONER

## 2019-02-27 PROCEDURE — 85025 COMPLETE CBC W/AUTO DIFF WBC: CPT | Performed by: NURSE PRACTITIONER

## 2019-02-27 PROCEDURE — 80159 DRUG ASSAY CLOZAPINE: CPT | Mod: 90 | Performed by: NURSE PRACTITIONER

## 2019-03-04 LAB
CLOZAPINE AND METABOLITES TOTAL: 803 NG/ML
CLOZAPINE SERPL-MCNC: 530 NG/ML
CLOZAPINE-N-OXIDE QUANT: <100 NG/ML
NORCLOZAPINE SERPL-MCNC: 273 NG/ML

## 2019-03-26 DIAGNOSIS — F20.9 SCHIZOPHRENIA (H): ICD-10-CM

## 2019-03-26 DIAGNOSIS — F20.0 PARANOID SCHIZOPHRENIA, SUBCHRONIC CONDITION WITH ACUTE EXACERBATION (H): ICD-10-CM

## 2019-03-26 DIAGNOSIS — Z79.899 LONG TERM USE OF DRUG: ICD-10-CM

## 2019-03-26 LAB
BASOPHILS # BLD AUTO: 0 10E9/L (ref 0–0.2)
BASOPHILS NFR BLD AUTO: 0.4 %
DIFFERENTIAL METHOD BLD: ABNORMAL
EOSINOPHIL # BLD AUTO: 0 10E9/L (ref 0–0.7)
EOSINOPHIL NFR BLD AUTO: 0 %
ERYTHROCYTE [DISTWIDTH] IN BLOOD BY AUTOMATED COUNT: 13.1 % (ref 10–15)
HCT VFR BLD AUTO: 38.2 % (ref 35–47)
HGB BLD-MCNC: 12.3 G/DL (ref 11.7–15.7)
LYMPHOCYTES # BLD AUTO: 1.8 10E9/L (ref 0.8–5.3)
LYMPHOCYTES NFR BLD AUTO: 25.3 %
MCH RBC QN AUTO: 33.2 PG (ref 26.5–33)
MCHC RBC AUTO-ENTMCNC: 32.2 G/DL (ref 31.5–36.5)
MCV RBC AUTO: 103 FL (ref 78–100)
MONOCYTES # BLD AUTO: 0.4 10E9/L (ref 0–1.3)
MONOCYTES NFR BLD AUTO: 6.2 %
NEUTROPHILS # BLD AUTO: 4.7 10E9/L (ref 1.6–8.3)
NEUTROPHILS NFR BLD AUTO: 68.1 %
PLATELET # BLD AUTO: 371 10E9/L (ref 150–450)
RBC # BLD AUTO: 3.7 10E12/L (ref 3.8–5.2)
WBC # BLD AUTO: 7 10E9/L (ref 4–11)

## 2019-03-26 PROCEDURE — 36415 COLL VENOUS BLD VENIPUNCTURE: CPT | Performed by: NURSE PRACTITIONER

## 2019-03-26 PROCEDURE — 80159 DRUG ASSAY CLOZAPINE: CPT | Mod: 90 | Performed by: NURSE PRACTITIONER

## 2019-03-26 PROCEDURE — 85025 COMPLETE CBC W/AUTO DIFF WBC: CPT | Performed by: NURSE PRACTITIONER

## 2019-03-29 LAB
CLOZAPINE AND METABOLITES TOTAL: 1007 NG/ML
CLOZAPINE SERPL-MCNC: 635 NG/ML
CLOZAPINE-N-OXIDE QUANT: <100 NG/ML
NORCLOZAPINE SERPL-MCNC: 372 NG/ML

## 2019-04-23 DIAGNOSIS — F20.9 SCHIZOPHRENIA (H): ICD-10-CM

## 2019-04-23 DIAGNOSIS — F20.0 PARANOID SCHIZOPHRENIA, SUBCHRONIC CONDITION WITH ACUTE EXACERBATION (H): ICD-10-CM

## 2019-04-23 DIAGNOSIS — Z79.899 ENCOUNTER FOR LONG-TERM (CURRENT) USE OF MEDICATIONS: Primary | ICD-10-CM

## 2019-04-23 DIAGNOSIS — Z79.899 LONG TERM USE OF DRUG: ICD-10-CM

## 2019-04-23 LAB
BASOPHILS # BLD AUTO: 0.1 10E9/L (ref 0–0.2)
BASOPHILS NFR BLD AUTO: 0.6 %
DIFFERENTIAL METHOD BLD: ABNORMAL
EOSINOPHIL # BLD AUTO: 0 10E9/L (ref 0–0.7)
EOSINOPHIL NFR BLD AUTO: 0.1 %
ERYTHROCYTE [DISTWIDTH] IN BLOOD BY AUTOMATED COUNT: 13.6 % (ref 10–15)
HCT VFR BLD AUTO: 37.2 % (ref 35–47)
HGB BLD-MCNC: 11.9 G/DL (ref 11.7–15.7)
LYMPHOCYTES # BLD AUTO: 1.9 10E9/L (ref 0.8–5.3)
LYMPHOCYTES NFR BLD AUTO: 23.3 %
MCH RBC QN AUTO: 33.1 PG (ref 26.5–33)
MCHC RBC AUTO-ENTMCNC: 32 G/DL (ref 31.5–36.5)
MCV RBC AUTO: 104 FL (ref 78–100)
MONOCYTES # BLD AUTO: 0.5 10E9/L (ref 0–1.3)
MONOCYTES NFR BLD AUTO: 6.1 %
NEUTROPHILS # BLD AUTO: 5.6 10E9/L (ref 1.6–8.3)
NEUTROPHILS NFR BLD AUTO: 69.9 %
PLATELET # BLD AUTO: 329 10E9/L (ref 150–450)
RBC # BLD AUTO: 3.59 10E12/L (ref 3.8–5.2)
WBC # BLD AUTO: 8 10E9/L (ref 4–11)

## 2019-04-23 PROCEDURE — 85025 COMPLETE CBC W/AUTO DIFF WBC: CPT | Performed by: NURSE PRACTITIONER

## 2019-04-23 PROCEDURE — 80159 DRUG ASSAY CLOZAPINE: CPT | Mod: 90 | Performed by: NURSE PRACTITIONER

## 2019-04-23 PROCEDURE — 36415 COLL VENOUS BLD VENIPUNCTURE: CPT | Performed by: NURSE PRACTITIONER

## 2019-04-25 LAB
CLOZAPINE AND METABOLITES TOTAL: 831 NG/ML
CLOZAPINE SERPL-MCNC: 515 NG/ML
CLOZAPINE-N-OXIDE QUANT: <100 NG/ML
NORCLOZAPINE SERPL-MCNC: 316 NG/ML

## 2019-04-30 DIAGNOSIS — Z79.899 ENCOUNTER FOR LONG-TERM (CURRENT) USE OF MEDICATIONS: ICD-10-CM

## 2019-04-30 LAB
ALBUMIN SERPL-MCNC: 4.1 G/DL (ref 3.4–5)
ALP SERPL-CCNC: 81 U/L (ref 40–150)
ALT SERPL W P-5'-P-CCNC: 25 U/L (ref 0–50)
ANION GAP SERPL CALCULATED.3IONS-SCNC: 5 MMOL/L (ref 3–14)
AST SERPL W P-5'-P-CCNC: 11 U/L (ref 0–45)
BILIRUB SERPL-MCNC: 0.4 MG/DL (ref 0.2–1.3)
BUN SERPL-MCNC: 12 MG/DL (ref 7–30)
CALCIUM SERPL-MCNC: 9.5 MG/DL (ref 8.5–10.1)
CHLORIDE SERPL-SCNC: 104 MMOL/L (ref 94–109)
CO2 SERPL-SCNC: 26 MMOL/L (ref 20–32)
CREAT SERPL-MCNC: 1.04 MG/DL (ref 0.52–1.04)
GFR SERPL CREATININE-BSD FRML MDRD: 63 ML/MIN/{1.73_M2}
GLUCOSE SERPL-MCNC: 136 MG/DL (ref 70–99)
LITHIUM SERPL-SCNC: 1.1 MMOL/L (ref 0.6–1.2)
POTASSIUM SERPL-SCNC: 3.5 MMOL/L (ref 3.4–5.3)
PROT SERPL-MCNC: 7 G/DL (ref 6.8–8.8)
SODIUM SERPL-SCNC: 135 MMOL/L (ref 133–144)

## 2019-04-30 PROCEDURE — 36415 COLL VENOUS BLD VENIPUNCTURE: CPT | Performed by: NURSE PRACTITIONER

## 2019-04-30 PROCEDURE — 80178 ASSAY OF LITHIUM: CPT | Performed by: NURSE PRACTITIONER

## 2019-04-30 PROCEDURE — 80053 COMPREHEN METABOLIC PANEL: CPT | Performed by: NURSE PRACTITIONER

## 2019-05-08 ENCOUNTER — TELEPHONE (OUTPATIENT)
Dept: UROLOGY | Facility: CLINIC | Age: 49
End: 2019-05-08

## 2019-05-08 NOTE — TELEPHONE ENCOUNTER
"Unable to find documentation of increased paranoia as known side effect for Vesicare. Patient has been on since October 2018.     Discussed with group home caregiver that patient may want to be seen by PCP/ED/Psych sooner than answer will be received if patient in crisis. Manager at  stated that patient has these bouts, but was asked by psych to \"check on veiscare\".     Patient however is only wearing a pad now at night, and is VERY happy with results on medication. Would like to continue if med is unlikely to be causing increased  paranoia.     Are you aware of this being a side effect? Any recommendations?     Thank you,   Anton KENNEY RN   Specialty Clinics     Number to call back: [phone] (Jalen- Group Home care giver)   "

## 2019-05-08 NOTE — TELEPHONE ENCOUNTER
Reason for Call:  Other call back    Detailed comments: Pt started on Vesicare last Oct. 2018.  In March started having an increase in paranoid thoughts and hallucinations - psych pt - wondering if the medication could be increasing her symptoms.    Phone Number Patient can be reached at: [phone]    Best Time: any    Can we leave a detailed message on this number? Not Applicable    Call taken on 5/8/2019 at 3:31 PM by Terestia Guzman

## 2019-05-09 NOTE — TELEPHONE ENCOUNTER
Patient planning to continue on with the medication and not take the break.  Patient  has an appointment with the [facility] nurse in 2 weeks.  Symptoms will be further discussed then.    Danica Aguilar   [facility]  RN

## 2019-05-09 NOTE — TELEPHONE ENCOUNTER
Ciro Wang MD  You 2 minutes ago (11:05 AM)     Thank you for the note. The 5-month time delay tends to suggest that the mental status issues are not related to the Vesicare; to clarify we could consider holding the Vesicare for several weeks to see if the mental status issues normalize     Prince         Called  back. Left detailed message with above information. Asked for callback to let staff know if patient will be holding medication, or if they would like to discuss other options with PCP/Psych as patient having good results on medication    Anton KENNEY RN   Specialty Clinics

## 2019-05-13 ENCOUNTER — TELEPHONE (OUTPATIENT)
Dept: FAMILY MEDICINE | Facility: CLINIC | Age: 49
End: 2019-05-13

## 2019-05-13 DIAGNOSIS — Z74.1 ASSISTANCE NEEDED FOR PERSONAL HYGIENE: ICD-10-CM

## 2019-05-13 DIAGNOSIS — R32 URINARY INCONTINENCE, UNSPECIFIED TYPE: Primary | ICD-10-CM

## 2019-05-13 NOTE — TELEPHONE ENCOUNTER
Received a fax from [facility] [employer] [facility] stating the following:    We have been contacted by this patient requesting incontinence supplies. If you feel this patient is able to get these products please send us an Rx for INCONTINENCE PRODUCTS (up to 300/month), GLOVES (4 BOXES, & Chux Pads. Please include REFILL AMOUNTS or PRN. Please include all associated diagnosis' and Dx/ICD-10 codes associated with the patient so we can bill product to their insurance. If you feel this patient DOES NOT qualify please state on this form and send it back to me ASAP. Please also note that with their insurance if there is NOT an expiration date I can make good for 5 years for this patient.     Thank you for your time in this matter. If you have any questions please call us at [phone]. Please fax the completed copy back to [phone].

## 2019-05-21 DIAGNOSIS — Z79.899 LONG TERM USE OF DRUG: ICD-10-CM

## 2019-05-21 DIAGNOSIS — F20.0 PARANOID SCHIZOPHRENIA, SUBCHRONIC CONDITION WITH ACUTE EXACERBATION (H): ICD-10-CM

## 2019-05-21 DIAGNOSIS — F20.9 SCHIZOPHRENIA (H): ICD-10-CM

## 2019-05-21 LAB
BASOPHILS # BLD AUTO: 0.1 10E9/L (ref 0–0.2)
BASOPHILS NFR BLD AUTO: 0.4 %
DIFFERENTIAL METHOD BLD: ABNORMAL
EOSINOPHIL # BLD AUTO: 0 10E9/L (ref 0–0.7)
EOSINOPHIL NFR BLD AUTO: 0.1 %
ERYTHROCYTE [DISTWIDTH] IN BLOOD BY AUTOMATED COUNT: 14.2 % (ref 10–15)
HCT VFR BLD AUTO: 35.2 % (ref 35–47)
HGB BLD-MCNC: 11.8 G/DL (ref 11.7–15.7)
LYMPHOCYTES # BLD AUTO: 1.7 10E9/L (ref 0.8–5.3)
LYMPHOCYTES NFR BLD AUTO: 12.8 %
MCH RBC QN AUTO: 34.2 PG (ref 26.5–33)
MCHC RBC AUTO-ENTMCNC: 33.5 G/DL (ref 31.5–36.5)
MCV RBC AUTO: 102 FL (ref 78–100)
MONOCYTES # BLD AUTO: 0.7 10E9/L (ref 0–1.3)
MONOCYTES NFR BLD AUTO: 5.6 %
NEUTROPHILS # BLD AUTO: 10.5 10E9/L (ref 1.6–8.3)
NEUTROPHILS NFR BLD AUTO: 81.1 %
PLATELET # BLD AUTO: 354 10E9/L (ref 150–450)
RBC # BLD AUTO: 3.45 10E12/L (ref 3.8–5.2)
WBC # BLD AUTO: 12.9 10E9/L (ref 4–11)

## 2019-05-21 PROCEDURE — 80159 DRUG ASSAY CLOZAPINE: CPT | Mod: 90 | Performed by: NURSE PRACTITIONER

## 2019-05-21 PROCEDURE — 36415 COLL VENOUS BLD VENIPUNCTURE: CPT | Performed by: NURSE PRACTITIONER

## 2019-05-21 PROCEDURE — 85025 COMPLETE CBC W/AUTO DIFF WBC: CPT | Performed by: NURSE PRACTITIONER

## 2019-05-24 LAB
CLOZAPINE AND METABOLITES TOTAL: 1497 NG/ML
CLOZAPINE SERPL-MCNC: 885 NG/ML
CLOZAPINE-N-OXIDE QUANT: 178 NG/ML
NORCLOZAPINE SERPL-MCNC: 434 NG/ML

## 2019-06-18 DIAGNOSIS — F20.0 PARANOID SCHIZOPHRENIA, SUBCHRONIC CONDITION WITH ACUTE EXACERBATION (H): ICD-10-CM

## 2019-06-18 DIAGNOSIS — Z79.899 LONG TERM USE OF DRUG: ICD-10-CM

## 2019-06-18 DIAGNOSIS — F20.9 SCHIZOPHRENIA (H): ICD-10-CM

## 2019-06-18 LAB
BASOPHILS # BLD AUTO: 0.1 10E9/L (ref 0–0.2)
BASOPHILS NFR BLD AUTO: 0.6 %
DIFFERENTIAL METHOD BLD: ABNORMAL
EOSINOPHIL # BLD AUTO: 0 10E9/L (ref 0–0.7)
EOSINOPHIL NFR BLD AUTO: 0.2 %
ERYTHROCYTE [DISTWIDTH] IN BLOOD BY AUTOMATED COUNT: 14.4 % (ref 10–15)
HCT VFR BLD AUTO: 35.3 % (ref 35–47)
HGB BLD-MCNC: 11.3 G/DL (ref 11.7–15.7)
LYMPHOCYTES # BLD AUTO: 2.3 10E9/L (ref 0.8–5.3)
LYMPHOCYTES NFR BLD AUTO: 20.8 %
MCH RBC QN AUTO: 33.7 PG (ref 26.5–33)
MCHC RBC AUTO-ENTMCNC: 32 G/DL (ref 31.5–36.5)
MCV RBC AUTO: 105 FL (ref 78–100)
MONOCYTES # BLD AUTO: 0.5 10E9/L (ref 0–1.3)
MONOCYTES NFR BLD AUTO: 5 %
NEUTROPHILS # BLD AUTO: 8 10E9/L (ref 1.6–8.3)
NEUTROPHILS NFR BLD AUTO: 73.4 %
PLATELET # BLD AUTO: 373 10E9/L (ref 150–450)
RBC # BLD AUTO: 3.35 10E12/L (ref 3.8–5.2)
WBC # BLD AUTO: 10.8 10E9/L (ref 4–11)

## 2019-06-18 PROCEDURE — 80159 DRUG ASSAY CLOZAPINE: CPT | Mod: 90 | Performed by: NURSE PRACTITIONER

## 2019-06-18 PROCEDURE — 85025 COMPLETE CBC W/AUTO DIFF WBC: CPT | Performed by: NURSE PRACTITIONER

## 2019-06-18 PROCEDURE — 36415 COLL VENOUS BLD VENIPUNCTURE: CPT | Performed by: NURSE PRACTITIONER

## 2019-06-19 ENCOUNTER — TRANSFERRED RECORDS (OUTPATIENT)
Dept: HEALTH INFORMATION MANAGEMENT | Facility: CLINIC | Age: 49
End: 2019-06-19

## 2019-06-20 LAB
CLOZAPINE AND METABOLITES TOTAL: 821 NG/ML
CLOZAPINE SERPL-MCNC: 474 NG/ML
CLOZAPINE-N-OXIDE QUANT: <100 NG/ML
NORCLOZAPINE SERPL-MCNC: 347 NG/ML

## 2019-06-24 DIAGNOSIS — K59.09 CHRONIC CONSTIPATION: ICD-10-CM

## 2019-06-24 NOTE — TELEPHONE ENCOUNTER
Requested Prescriptions   Pending Prescriptions Disp Refills     sennosides (SENOKOT) 8.6 MG tablet [Pharmacy Med Name: SENNA 8.6 MG TABLET] 200 tablet 5     Sig: TAKE THREE TABLETS BY MOUTH TWICE DAILY       There is no refill protocol information for this order              Last Written Prescription Date:  12/28/18  Last Fill Quantity: 200,   # refills: 5  Last Office Visit: 7/24/18  Future Office visit:       Routing refill request to provider for review/approval because:  Drug not on the [facility], P or [facility] refill protocol or controlled substance

## 2019-06-24 NOTE — TELEPHONE ENCOUNTER
"Requested Prescriptions   Pending Prescriptions Disp Refills     GAVILAX powder [Pharmacy Med Name: GAVILAX 17G/DOSE POWDER] 527 g 11     Sig: Take 17 g (1 capful) by mouth daily       Laxatives Protocol Passed - 6/24/2019 11:04 AM        Passed - Patient is age 6 or older        Passed - Recent (12 mo) or future (30 days) visit within the authorizing provider's specialty     Patient had office visit in the last 12 months or has a visit in the next 30 days with authorizing provider or within the authorizing provider's specialty.  See \"Patient Info\" tab in inbasket, or \"Choose Columns\" in Meds & Orders section of the refill encounter.              Passed - Medication is active on med list        Last Written Prescription Date:  5/25/19  Last Fill Quantity: 527g,  # refills: 11   Last office visit: 11/27/2018 with prescribing provider:     Future Office Visit:      "

## 2019-06-25 RX ORDER — SENNOSIDES 8.6 MG
TABLET ORAL
Qty: 200 TABLET | Refills: 0 | Status: SHIPPED | OUTPATIENT
Start: 2019-06-25 | End: 2019-09-18

## 2019-06-25 RX ORDER — POLYETHYLENE GLYCOL 3350 17 G/17G
POWDER, FOR SOLUTION ORAL
Qty: 527 G | Refills: 0 | Status: SHIPPED | OUTPATIENT
Start: 2019-06-25 | End: 2019-07-17

## 2019-06-27 ENCOUNTER — TELEPHONE (OUTPATIENT)
Dept: FAMILY MEDICINE | Facility: CLINIC | Age: 49
End: 2019-06-27

## 2019-06-27 NOTE — TELEPHONE ENCOUNTER
Panel Management Review      Patient has the following on her problem list:     Asthma review     ACT Total Scores 6/21/2018   ACT TOTAL SCORE -   ASTHMA ER VISITS -   ASTHMA HOSPITALIZATIONS -   ACT TOTAL SCORE (Goal Greater than or Equal to 20) 22   In the past 12 months, how many times did you visit the emergency room for your asthma without being admitted to the hospital? 0   In the past 12 months, how many times were you hospitalized overnight because of your asthma? 0      1. Is Asthma diagnosis on the Problem List? Yes    2. Is Asthma listed on Health Maintenance? Yes    3. Patient is due for:  ACT      Composite cancer screening  Chart review shows that this patient is due/due soon for the following None  Summary:    Patient is due/failing the following:   ACT    Action needed:   Patient needs to do ACT.    Type of outreach:    Copy of ACT mailed to patient, will reach out in 5 days.    Questions for provider review:    None                                                                                                                                    Nena Reagan LPN

## 2019-06-27 NOTE — LETTER
[facility]  [address] [facility] [address]  [phone]        June 27, 2019  Doreen Gramajo  [address] [facility] [address]    Dear Doreen,    I care about your health and have reviewed your health plan. I have reviewed your medical conditions, medication list, and lab results and am making recommendations based on this review, to better manage your health.    You are in particular need of attention regarding:  -Asthma  -Cervical Cancer Screening    I am recommending that you:  -schedule a PAP SMEAR EXAM which is due.  Please disregard this reminder if you have had this exam elsewhere within the last year.  It would be helpful for us to have a copy of your recent pap smear report in our file so that we can best coordinate your care.   -Complete and return the attached ASTHMA CONTROL TEST.  If your total score is 19 or less or you have been to the ER or urgent care for your asthma, then please schedule an asthma followup appointment.    Here is a list of Health Maintenance topics that are due now or due soon:  Health Maintenance Due   Topic Date Due     ASTHMA CONTROL TEST  12/20/2018     PHQ-2  01/01/2019     PAP  06/21/2019     ASTHMA ACTION PLAN  07/24/2019       Please call us at [phone] (or use Your.[email or web address]) to address the above recommendations.     Thank you for trusting [facility] and we appreciate the opportunity to serve you.  We look forward to supporting your healthcare needs in the future.    Healthy Regards,    Josey Bonilla, CNP

## 2019-07-16 DIAGNOSIS — F20.9 SCHIZOPHRENIA (H): ICD-10-CM

## 2019-07-16 DIAGNOSIS — F20.0 PARANOID SCHIZOPHRENIA, SUBCHRONIC CONDITION WITH ACUTE EXACERBATION (H): ICD-10-CM

## 2019-07-16 DIAGNOSIS — Z79.899 LONG TERM USE OF DRUG: ICD-10-CM

## 2019-07-16 LAB
BASOPHILS # BLD AUTO: 0.1 10E9/L (ref 0–0.2)
BASOPHILS NFR BLD AUTO: 0.6 %
DIFFERENTIAL METHOD BLD: ABNORMAL
EOSINOPHIL # BLD AUTO: 0 10E9/L (ref 0–0.7)
EOSINOPHIL NFR BLD AUTO: 0.2 %
ERYTHROCYTE [DISTWIDTH] IN BLOOD BY AUTOMATED COUNT: 14.4 % (ref 10–15)
HCT VFR BLD AUTO: 36.9 % (ref 35–47)
HGB BLD-MCNC: 11.9 G/DL (ref 11.7–15.7)
LYMPHOCYTES # BLD AUTO: 1.9 10E9/L (ref 0.8–5.3)
LYMPHOCYTES NFR BLD AUTO: 22.6 %
MCH RBC QN AUTO: 34.4 PG (ref 26.5–33)
MCHC RBC AUTO-ENTMCNC: 32.2 G/DL (ref 31.5–36.5)
MCV RBC AUTO: 107 FL (ref 78–100)
MONOCYTES # BLD AUTO: 0.7 10E9/L (ref 0–1.3)
MONOCYTES NFR BLD AUTO: 8.2 %
NEUTROPHILS # BLD AUTO: 5.7 10E9/L (ref 1.6–8.3)
NEUTROPHILS NFR BLD AUTO: 68.4 %
PLATELET # BLD AUTO: 354 10E9/L (ref 150–450)
RBC # BLD AUTO: 3.46 10E12/L (ref 3.8–5.2)
WBC # BLD AUTO: 8.4 10E9/L (ref 4–11)

## 2019-07-16 PROCEDURE — 36415 COLL VENOUS BLD VENIPUNCTURE: CPT | Performed by: NURSE PRACTITIONER

## 2019-07-16 PROCEDURE — 80159 DRUG ASSAY CLOZAPINE: CPT | Mod: 90 | Performed by: NURSE PRACTITIONER

## 2019-07-16 PROCEDURE — 85025 COMPLETE CBC W/AUTO DIFF WBC: CPT | Performed by: NURSE PRACTITIONER

## 2019-07-17 DIAGNOSIS — K59.09 CHRONIC CONSTIPATION: ICD-10-CM

## 2019-07-17 NOTE — TELEPHONE ENCOUNTER
"Requested Prescriptions   Pending Prescriptions Disp Refills     GAVILAX powder [Pharmacy Med Name: GAVILAX 17G/DOSE POWDER] 527 g 0     Sig: Take 17 g (1 capful) by mouth daily       Laxatives Protocol Passed - 7/17/2019  4:09 PM        Passed - Patient is age 6 or older        Passed - Recent (12 mo) or future (30 days) visit within the authorizing provider's specialty     Patient had office visit in the last 12 months or has a visit in the next 30 days with authorizing provider or within the authorizing provider's specialty.  See \"Patient Info\" tab in inbasket, or \"Choose Columns\" in Meds & Orders section of the refill encounter.              Passed - Medication is active on med list        Last Written Prescription Date:  6/25/19  Last Fill Quantity: 527,  # refills: 0   Last office visit: 11/27/2018 with prescribing provider:     Future Office Visit:   Next 5 appointments (look out 90 days)    Jul 30, 2019 10:40 AM CDT  PHYSICAL with Josey Bonilla CNP  [facility] [address] [place] [id number]  [phone]           "

## 2019-07-18 RX ORDER — POLYETHYLENE GLYCOL 3350 17 G/17G
POWDER, FOR SOLUTION ORAL
Qty: 527 G | Refills: 1 | Status: SHIPPED | OUTPATIENT
Start: 2019-07-18 | End: 2019-09-19

## 2019-07-19 LAB
CLOZAPINE AND METABOLITES TOTAL: 568 NG/ML
CLOZAPINE SERPL-MCNC: 345 NG/ML
CLOZAPINE-N-OXIDE QUANT: <100 NG/ML
NORCLOZAPINE SERPL-MCNC: 223 NG/ML

## 2019-07-25 ENCOUNTER — TRANSFERRED RECORDS (OUTPATIENT)
Dept: HEALTH INFORMATION MANAGEMENT | Facility: CLINIC | Age: 49
End: 2019-07-25

## 2019-07-26 LAB — PHQ9 SCORE: 9

## 2019-07-29 DIAGNOSIS — E03.9 HYPOTHYROIDISM, UNSPECIFIED TYPE: ICD-10-CM

## 2019-07-29 RX ORDER — LEVOTHYROXINE SODIUM 50 UG/1
TABLET ORAL
Qty: 90 TABLET | Refills: 0 | Status: SHIPPED | OUTPATIENT
Start: 2019-07-29 | End: 2019-10-18

## 2019-07-30 ENCOUNTER — TELEPHONE (OUTPATIENT)
Dept: FAMILY MEDICINE | Facility: CLINIC | Age: 49
End: 2019-07-30

## 2019-07-30 ENCOUNTER — OFFICE VISIT (OUTPATIENT)
Dept: FAMILY MEDICINE | Facility: CLINIC | Age: 49
End: 2019-07-30
Payer: MEDICARE

## 2019-07-30 VITALS
BODY MASS INDEX: 22.08 KG/M2 | OXYGEN SATURATION: 100 % | HEART RATE: 80 BPM | WEIGHT: 120 LBS | HEIGHT: 62 IN | SYSTOLIC BLOOD PRESSURE: 118 MMHG | TEMPERATURE: 97.5 F | DIASTOLIC BLOOD PRESSURE: 66 MMHG | RESPIRATION RATE: 16 BRPM

## 2019-07-30 DIAGNOSIS — Z00.00 ENCOUNTER FOR WELLNESS EXAMINATION IN ADULT: Primary | ICD-10-CM

## 2019-07-30 DIAGNOSIS — K59.09 CHRONIC CONSTIPATION: Primary | ICD-10-CM

## 2019-07-30 DIAGNOSIS — J45.20 MILD INTERMITTENT REACTIVE AIRWAY DISEASE WITHOUT COMPLICATION: ICD-10-CM

## 2019-07-30 DIAGNOSIS — Z78.9 LIVES IN GROUP HOME: ICD-10-CM

## 2019-07-30 DIAGNOSIS — G44.89 HEADACHE ASSOCIATED WITH HORMONAL FACTORS: ICD-10-CM

## 2019-07-30 DIAGNOSIS — Z86.59 HISTORY OF ANOREXIA NERVOSA: ICD-10-CM

## 2019-07-30 DIAGNOSIS — F20.0 PARANOID SCHIZOPHRENIA (H): Chronic | ICD-10-CM

## 2019-07-30 DIAGNOSIS — Z86.59 HISTORY OF EATING DISORDER: ICD-10-CM

## 2019-07-30 DIAGNOSIS — Z13.6 CARDIOVASCULAR SCREENING; LDL GOAL LESS THAN 160: ICD-10-CM

## 2019-07-30 DIAGNOSIS — K59.09 CHRONIC CONSTIPATION: ICD-10-CM

## 2019-07-30 DIAGNOSIS — E03.9 HYPOTHYROIDISM, UNSPECIFIED TYPE: Chronic | ICD-10-CM

## 2019-07-30 DIAGNOSIS — Z12.4 CERVICAL CANCER SCREENING: ICD-10-CM

## 2019-07-30 PROBLEM — J45.909 REACTIVE AIRWAY DISEASE WITHOUT COMPLICATION: Status: ACTIVE | Noted: 2019-07-30

## 2019-07-30 PROBLEM — J98.01 ACUTE BRONCHOSPASM: Status: RESOLVED | Noted: 2018-07-24 | Resolved: 2019-07-30

## 2019-07-30 LAB
CHOLEST SERPL-MCNC: 198 MG/DL
HDLC SERPL-MCNC: 61 MG/DL
LDLC SERPL CALC-MCNC: 113 MG/DL
NONHDLC SERPL-MCNC: 137 MG/DL
TRIGL SERPL-MCNC: 119 MG/DL
TSH SERPL DL<=0.005 MIU/L-ACNC: 2.92 MU/L (ref 0.4–4)

## 2019-07-30 PROCEDURE — 99396 PREV VISIT EST AGE 40-64: CPT | Performed by: NURSE PRACTITIONER

## 2019-07-30 PROCEDURE — G0145 SCR C/V CYTO,THINLAYER,RESCR: HCPCS | Performed by: NURSE PRACTITIONER

## 2019-07-30 PROCEDURE — 80061 LIPID PANEL: CPT | Performed by: NURSE PRACTITIONER

## 2019-07-30 PROCEDURE — G0476 HPV COMBO ASSAY CA SCREEN: HCPCS | Performed by: NURSE PRACTITIONER

## 2019-07-30 PROCEDURE — 36415 COLL VENOUS BLD VENIPUNCTURE: CPT | Performed by: NURSE PRACTITIONER

## 2019-07-30 PROCEDURE — 99214 OFFICE O/P EST MOD 30 MIN: CPT | Mod: 25 | Performed by: NURSE PRACTITIONER

## 2019-07-30 PROCEDURE — 84443 ASSAY THYROID STIM HORMONE: CPT | Performed by: NURSE PRACTITIONER

## 2019-07-30 RX ORDER — DESVENLAFAXINE 100 MG/1
TABLET, EXTENDED RELEASE ORAL DAILY
COMMUNITY

## 2019-07-30 RX ORDER — ALBUTEROL SULFATE 90 UG/1
AEROSOL, METERED RESPIRATORY (INHALATION)
Qty: 3 INHALER | Refills: 3 | Status: SHIPPED | OUTPATIENT
Start: 2019-07-30

## 2019-07-30 RX ORDER — BISACODYL 10 MG
10 SUPPOSITORY, RECTAL RECTAL DAILY PRN
Qty: 16 SUPPOSITORY | Refills: 0 | Status: SHIPPED | OUTPATIENT
Start: 2019-07-30

## 2019-07-30 ASSESSMENT — MIFFLIN-ST. JEOR: SCORE: 1127.57

## 2019-07-30 NOTE — PATIENT INSTRUCTIONS
1. Encounter for wellness examination in adult  Screening    2. Lives in group home    3. Paranoid schizophrenia (H)  Chronic, stable  Follows with Jody Schoenecker    4. History of eating disorder  Chronic, unstable  Follows with Atiya Saldaña  Consider [facility] if no improvement    5. CARDIOVASCULAR SCREENING; LDL GOAL LESS THAN 160  Screening  - Lipid panel reflex to direct LDL Fasting    6. Hypothyroidism, unspecified type  Chronic, stable  - TSH with free T4 reflex    7. History of anorexia nervosa  Historical    8. Cervical cancer screening  Screening  - Pap imaged thin layer screen with HPV - recommended age 30 - 65 years (select HPV order below)    9. Chronic constipation  Acute on chronic  Use fleet enema or suppository on day #4 of constipation    10. Mild intermittent reactive airway disease without complication  Chronic, stable  - albuterol (VENTOLIN HFA) 108 (90 Base) MCG/ACT inhaler; Inhale 2 puffs into the lungs every 6 hours (PRN for shortness of breath)  Dispense: 3 Inhaler; Refill: 3    11. Headache associated with hormonal factors  Chronic, stable  - aspirin-acetaminophen-caffeine (EXCEDRIN MIGRAINE) 250-250-65 MG tablet; Take 1 tablet by mouth every 6 hours as needed for headaches  Dispense: 30 tablet; Refill: 3  If Excedrin is helpful, use as needed  If no improvement, talk with July about Amitriptyline for chronic headache  (moderate interaction with Haldol)      Patient Education     Self-Care for Headaches  Most headaches aren't serious and can be relieved with self-care. But some headaches may be a sign of another health problem like eye trouble or high blood pressure. To find the best treatment, learn what kind of headaches you get. For tension headaches, self-care will usually help. To treat migraines, ask your healthcare provider for advice. It is also possible to get both tension and migraine headaches. Self-care involves relieving the pain and avoiding headache  triggers  if you  "can.    Ways to reduce pain and tension  Try these steps:    Apply a cold compress or ice pack to the pain site.    Drink fluids. If nausea makes it hard to drink, try sucking on ice.    Rest. Protect yourself from bright light and loud noises.    Calm your emotions by imagining a peaceful scene.    Massage tight neck, shoulder, and head muscles.    To relax muscles, soak in a hot bath or use a hot shower.  Use medicines  Aspirin or other over-the-counter pain medicines, such as ibuprofen and acetaminophen, can relieve headache. Remember: Never give aspirin to anyone 18 years old or younger because of the risk of developing Reye syndrome. Use pain medicines only when needed. Certain prescription medicines, if taken too often, can lead to rebound headaches. Check with your healthcare provider or pharmacist about your medicines.  Track your headaches  Keeping a headache diary can help you and your healthcare provider identify what's causing your headaches:    Note when each headache happens.    Identify your activities and the foods you've eaten 6 to 8 hours before the headache began.    Look for any trends or \"triggers.\"  Signs of tension headache  Any of the following can be signs:    Dull pain or feeling of pressure in a tight band around your head    Pain in your neck or shoulders    Headache without a definite beginning or end    Headache after an activity such as driving or working on a computer  Signs of migraine  Any of the following can be signs:    Throbbing pain on one or both sides of your head    Nausea or vomiting    Extreme sensitivity to light, sound, and smells    Bright spots, flashes, or other visual changes    Pain or nausea so severe that you can't continue your daily activities  Call your healthcare provider   If you have any of the following symptoms, contact your healthcare provider:    A headache that lingers after a recent injury or bump to the head.    A fever with a stiff neck or pain when " "you bend your head toward your chest.    A headache along with slurred speech, changes in your vision, or numbness or weakness in your arms or legs.    A headache for longer than 3 days.    Frequent headaches, especially in the morning.    Headaches with seizures     Seek immediate medical attention if you have a headache that you would call \"the worst headache you have ever had.\"  Date Last Reviewed: 3/1/2018    9245-4268 [employer]. [address] [place] [address], [place] [address]. All rights reserved. This information is not intended as a substitute for professional medical care. Always follow your healthcare professional's instructions.           "

## 2019-07-30 NOTE — PROGRESS NOTES
SUBJECTIVE   Doreen Gramajo is a  female who presents to clinic today for the following health issue(s):       [facility] annual physical    Psychiatric problems  Followed by Dr. Jody Schoenecker in [place]  Due for PAP  Lab Results   Component Value Date    PAP NIL 06/21/2016    PAP NIL 03/13/2012     History of anorexia nervosa  -She has lost 24 lbs since December 2018  She was in a [facility] outpatient program  Working with a therapist online every Wednesday- out of [place] (started less than a month ago)  Needs to be at 135 lbs for psych medications to work  Wt Readings from Last 10 Encounters:   07/30/19 54.4 kg (120 lb)   12/11/18 65.3 kg (144 lb)   08/28/18 64 kg (141 lb)   07/24/18 64 kg (141 lb)   06/20/18 64.4 kg (142 lb)   05/17/18 63 kg (139 lb)   11/14/17 63 kg (139 lb)   07/18/17 69.9 kg (154 lb)   05/26/17 77.6 kg (171 lb)   01/31/17 74.8 kg (165 lb)     Hypothyroidism  - stable  TSH   Date Value Ref Range Status   09/04/2018 2.48 0.40 - 4.00 mU/L Final   08/14/2018 1.94 0.40 - 4.00 mU/L Final   10/10/2017 3.02 0.40 - 4.00 mU/L Final   04/25/2017 3.94 0.40 - 4.00 mU/L Final   05/10/2016 2.54 0.40 - 4.00 mU/L Final     Constipation  - acute 5 days   Takes Senakot and Miralax daily  Pushes fluids    ADDRESS ALL HEALTH MAINTENANCE NEEDS- Place orders as needed  Health Maintenance Due   Topic Date Due     ASTHMA CONTROL TEST  12/20/2018     PHQ-2  01/01/2019     PAP  06/21/2019     MEDICARE ANNUAL WELLNESS VISIT  07/24/2019     ASTHMA ACTION PLAN  07/24/2019       PCP   Josey Bonilla, [phone]    PROBLEM LIST        Patient Active Problem List   Diagnosis     Schizophrenia (H)     Hypothyroidism     Bipolar affective (H)     Gastroesophageal reflux disease without esophagitis     CARDIOVASCULAR SCREENING; LDL GOAL LESS THAN 160     Health Care Home     Psychosis (H)     Behavior disturbance     Cigarette nicotine dependence with nicotine-induced disorder     Iron deficiency      "Chronic constipation     Urinary incontinence, unspecified type     Borderline intellectual functioning     History of anorexia nervosa     Rectal prolapse     Extrapyramidal symptom     History of eating disorder     Disturbance of conduct     Lives in group home     Dry eyes     Reactive airway disease without complication     Headache associated with hormonal factors       MEDICATIONS        Current Outpatient Medications   Medication     albuterol (VENTOLIN HFA) 108 (90 Base) MCG/ACT inhaler     aspirin-acetaminophen-caffeine (EXCEDRIN MIGRAINE) 250-250-65 MG tablet     B Complex-C TABS     CERTAVITE/ANTIOXIDANTS tablet     cloZAPine (CLOZARIL) 200 MG tablet     desvenlafaxine (PRISTIQ) 100 MG 24 hr tablet     DiphenhydrAMINE HCl (DIPHENDRYL PO)     Ferrous Sulfate 324 (65 Fe) MG TBEC     GAVILAX powder     haloperidol decanoate (HALDOL DECANOATE) 100 MG/ML injection     HALOPERIDOL PO     HALOPERIDOL PO     Incontinence Supply Disposable (DEPEND UNDERGARMENTS) MISC     levothyroxine (SYNTHROID/LEVOTHROID) 50 MCG tablet     lithium 300 MG tablet     LORAZEPAM PO     order for DME     pantoprazole (PROTONIX) 20 MG EC tablet     sennosides (SENOKOT) 8.6 MG tablet     Sodium Fluoride (SF 5000 PLUS) 1.1 % CREA     solifenacin (VESICARE) 5 MG tablet     solifenacin (VESICARE) 5 MG tablet     TRAZODONE HCL PO     No current facility-administered medications for this visit.        Reviewed and updated as needed this visit by Provider:  Tobacco  Allergies  Meds  Med Hx  Surg Hx  Fam Hx  Soc Hx     ROS      Constitutional, neuro, ENT, endocrine, pulmonary, cardiac, gastrointestinal, genitourinary, musculoskeletal, integument and psychiatric systems are negative, except as otherwise noted.    PHYSICAL EXAM   /66 (BP Location: Right arm, Patient Position: Sitting, Cuff Size: Adult Regular)   Pulse 80   Temp 97.5  F (36.4  C) (Tympanic)   Resp 16   Ht 1.575 m (5' 2\")   Wt 54.4 kg (120 lb)   LMP 07/16/2019  "  SpO2 100%   BMI 21.95 kg/m    Body mass index is 21.95 kg/m .  GENERAL APPEARANCE: healthy, alert and no distress  EYES: Eyes grossly normal to inspection, PERRL and conjunctivae and sclerae normal  NECK: no adenopathy, no asymmetry, masses, or scars and thyroid normal to palpation  RESP: lungs clear to auscultation - no rales, rhonchi or wheezes  CV: regular rates and rhythm, normal S1 S2, no S3 or S4 and no murmur, click or rub  ABDOMEN: soft, nontender, without hepatosplenomegaly or masses and bowel sounds distant  Pelvic exam: The external genitalia appeared normal. The vaginal vault was without bleeding, discharge, or odor. The cervix was smooth and shiny and normal in appearance. A pap was obtained. No vaginal support defects were noted, Bimanual exam normal-no masses were felt.   MS: extremities normal- no gross deformities noted  SKIN: no suspicious lesions or rashes  NEURO: Normal strength and tone, mentation intact and speech normal  PSYCH: mentation appears normal and affect flat    ASSESSMENT & PLAN     1. Encounter for wellness examination in adult  Screening    2. Lives in group home    3. Paranoid schizophrenia (H)  Chronic, stable  Follows with Jody Schoenecker    4. History of eating disorder  Chronic, unstable  Follows with [facility] if no improvement    5. CARDIOVASCULAR SCREENING; LDL GOAL LESS THAN 160  Screening  - Lipid panel reflex to direct LDL Fasting    6. Hypothyroidism, unspecified type  Chronic, stable  - TSH with free T4 reflex    7. History of anorexia nervosa  Historical    8. Cervical cancer screening  Screening  - Pap imaged thin layer screen with HPV - recommended age 30 - 65 years (select HPV order below)    9. Chronic constipation  Acute on chronic  Use fleet enema or suppository on day #4 of constipation    10. Mild intermittent reactive airway disease without complication  Chronic, stable  - albuterol (VENTOLIN HFA) 108 (90 Base) MCG/ACT  inhaler; Inhale 2 puffs into the lungs every 6 hours (PRN for shortness of breath)  Dispense: 3 Inhaler; Refill: 3    11. Headache associated with hormonal factors  Chronic, stable  - aspirin-acetaminophen-caffeine (EXCEDRIN MIGRAINE) 250-250-65 MG tablet; Take 1 tablet by mouth every 6 hours as needed for headaches  Dispense: 30 tablet; Refill: 3  If Excedrin is helpful, use as needed  If no improvement, talk with July about Amitriptyline for chronic headache  (moderate interaction with Haldol)      Patient Education     Self-Care for Headaches  Most headaches aren't serious and can be relieved with self-care. But some headaches may be a sign of another health problem like eye trouble or high blood pressure. To find the best treatment, learn what kind of headaches you get. For tension headaches, self-care will usually help. To treat migraines, ask your healthcare provider for advice. It is also possible to get both tension and migraine headaches. Self-care involves relieving the pain and avoiding headache  triggers  if you can.    Ways to reduce pain and tension  Try these steps:    Apply a cold compress or ice pack to the pain site.    Drink fluids. If nausea makes it hard to drink, try sucking on ice.    Rest. Protect yourself from bright light and loud noises.    Calm your emotions by imagining a peaceful scene.    Massage tight neck, shoulder, and head muscles.    To relax muscles, soak in a hot bath or use a hot shower.  Use medicines  Aspirin or other over-the-counter pain medicines, such as ibuprofen and acetaminophen, can relieve headache. Remember: Never give aspirin to anyone 18 years old or younger because of the risk of developing Reye syndrome. Use pain medicines only when needed. Certain prescription medicines, if taken too often, can lead to rebound headaches. Check with your healthcare provider or pharmacist about your medicines.  Track your headaches  Keeping a headache diary can help you and  "your healthcare provider identify what's causing your headaches:    Note when each headache happens.    Identify your activities and the foods you've eaten 6 to 8 hours before the headache began.    Look for any trends or \"triggers.\"  Signs of tension headache  Any of the following can be signs:    Dull pain or feeling of pressure in a tight band around your head    Pain in your neck or shoulders    Headache without a definite beginning or end    Headache after an activity such as driving or working on a computer  Signs of migraine  Any of the following can be signs:    Throbbing pain on one or both sides of your head    Nausea or vomiting    Extreme sensitivity to light, sound, and smells    Bright spots, flashes, or other visual changes    Pain or nausea so severe that you can't continue your daily activities  Call your healthcare provider   If you have any of the following symptoms, contact your healthcare provider:    A headache that lingers after a recent injury or bump to the head.    A fever with a stiff neck or pain when you bend your head toward your chest.    A headache along with slurred speech, changes in your vision, or numbness or weakness in your arms or legs.    A headache for longer than 3 days.    Frequent headaches, especially in the morning.    Headaches with seizures     Seek immediate medical attention if you have a headache that you would call \"the worst headache you have ever had.\"  Date Last Reviewed: 3/1/2018    1344-8883 [employer]. [address] [place] [address]. All rights reserved. This information is not intended as a substitute for professional medical care. Always follow your healthcare professional's instructions.             Risks, benefits, side effects and rationale for treatment plan fully discussed with the patient and understanding expressed.    RENÉ [facility]      "

## 2019-07-30 NOTE — LETTER
August 8, 2019    Doreen TABATHA Gramajo  [address] [facility] [address]    Dear ,  This letter is regarding your recent Pap smear (cervical cancer screening) and Human Papillomavirus (HPV) test.  We are happy to inform you that your Pap smear result is normal. Cervical cancer is closely linked with certain types of HPV. Your results showed no evidence of high-risk HPV.  We recommend you have your next PAP smear and HPV test in 5 years.  You will still need to return to the clinic every year for an annual exam and other preventive tests.  If you have additional questions regarding this result, please call our registered nurse, Mirna at [phone].  Sincerely,    Josey Bonilla, ABIGAIL/rlm

## 2019-07-30 NOTE — NURSING NOTE
"Chief Complaint   Patient presents with     Physical     Annual [facility]     Gyn Exam     Pap        Initial /66 (BP Location: Right arm, Patient Position: Sitting, Cuff Size: Adult Regular)   Pulse 80   Temp 97.5  F (36.4  C) (Tympanic)   Resp 16   Ht 1.575 m (5' 2\")   Wt 54.4 kg (120 lb)   LMP 07/16/2019   SpO2 100%   BMI 21.95 kg/m   Estimated body mass index is 21.95 kg/m  as calculated from the following:    Height as of this encounter: 1.575 m (5' 2\").    Weight as of this encounter: 54.4 kg (120 lb).    Patient presents to the clinic using No DME    Health Maintenance that is potentially due pending provider review:  Pap Smear    Done today.    Is there anyone who you would like to be able to receive your results? No  If yes have patient fill out WOODY    "

## 2019-07-30 NOTE — TELEPHONE ENCOUNTER
Demetrice from Group home requesting a script for Dulcolax. Pt had a OV today and discussed this today. Was suppose to see if pt's Suppositories are covered. Yes they are. Please send Dulcolax scripts  Nena [facility]

## 2019-07-30 NOTE — TELEPHONE ENCOUNTER
Per today OV-    9. Chronic constipation  Acute on chronic  Use fleet enema or suppository on day #4 of constipation    Group home staff states dulcolax supp covered by insurance, will need Rx sent to [place] yung Shukla RN

## 2019-07-31 ASSESSMENT — ASTHMA QUESTIONNAIRES: ACT_TOTALSCORE: 25

## 2019-08-01 ENCOUNTER — TELEPHONE (OUTPATIENT)
Dept: FAMILY MEDICINE | Facility: CLINIC | Age: 49
End: 2019-08-01

## 2019-08-01 NOTE — TELEPHONE ENCOUNTER
[facility]ody Schoene[facility]. Given to Josey to review.    Meliza [place] [place]

## 2019-08-05 LAB
COPATH REPORT: NORMAL
PAP: NORMAL

## 2019-08-06 LAB
FINAL DIAGNOSIS: NORMAL
HPV HR 12 DNA CVX QL NAA+PROBE: NEGATIVE
HPV16 DNA SPEC QL NAA+PROBE: NEGATIVE
HPV18 DNA SPEC QL NAA+PROBE: NEGATIVE
SPECIMEN DESCRIPTION: NORMAL
SPECIMEN SOURCE CVX/VAG CYTO: NORMAL

## 2019-08-22 DIAGNOSIS — Z79.899 LONG TERM USE OF DRUG: ICD-10-CM

## 2019-08-22 DIAGNOSIS — F20.9 SCHIZOPHRENIA (H): ICD-10-CM

## 2019-08-22 DIAGNOSIS — F20.0 PARANOID SCHIZOPHRENIA, SUBCHRONIC CONDITION WITH ACUTE EXACERBATION (H): ICD-10-CM

## 2019-08-22 LAB
BASOPHILS # BLD AUTO: 0 10E9/L (ref 0–0.2)
BASOPHILS NFR BLD AUTO: 0.5 %
DIFFERENTIAL METHOD BLD: ABNORMAL
EOSINOPHIL # BLD AUTO: 0.1 10E9/L (ref 0–0.7)
EOSINOPHIL NFR BLD AUTO: 0.8 %
ERYTHROCYTE [DISTWIDTH] IN BLOOD BY AUTOMATED COUNT: 13.3 % (ref 10–15)
HCT VFR BLD AUTO: 39.1 % (ref 35–47)
HGB BLD-MCNC: 12.7 G/DL (ref 11.7–15.7)
LYMPHOCYTES # BLD AUTO: 1.8 10E9/L (ref 0.8–5.3)
LYMPHOCYTES NFR BLD AUTO: 20.3 %
MCH RBC QN AUTO: 34 PG (ref 26.5–33)
MCHC RBC AUTO-ENTMCNC: 32.5 G/DL (ref 31.5–36.5)
MCV RBC AUTO: 105 FL (ref 78–100)
MONOCYTES # BLD AUTO: 0.6 10E9/L (ref 0–1.3)
MONOCYTES NFR BLD AUTO: 6.4 %
NEUTROPHILS # BLD AUTO: 6.4 10E9/L (ref 1.6–8.3)
NEUTROPHILS NFR BLD AUTO: 72 %
PLATELET # BLD AUTO: 321 10E9/L (ref 150–450)
RBC # BLD AUTO: 3.73 10E12/L (ref 3.8–5.2)
WBC # BLD AUTO: 8.9 10E9/L (ref 4–11)

## 2019-08-22 PROCEDURE — 36415 COLL VENOUS BLD VENIPUNCTURE: CPT | Performed by: NURSE PRACTITIONER

## 2019-08-22 PROCEDURE — 80159 DRUG ASSAY CLOZAPINE: CPT | Mod: 90 | Performed by: NURSE PRACTITIONER

## 2019-08-22 PROCEDURE — 85025 COMPLETE CBC W/AUTO DIFF WBC: CPT | Performed by: NURSE PRACTITIONER

## 2019-08-23 PROBLEM — F41.1 GAD (GENERALIZED ANXIETY DISORDER): Chronic | Status: ACTIVE | Noted: 2019-08-23

## 2019-08-23 PROBLEM — F41.1 GAD (GENERALIZED ANXIETY DISORDER): Status: ACTIVE | Noted: 2019-08-23

## 2019-08-25 LAB
CLOZAPINE AND METABOLITES TOTAL: 621 NG/ML
CLOZAPINE SERPL-MCNC: 403 NG/ML
CLOZAPINE-N-OXIDE QUANT: <100 NG/ML
NORCLOZAPINE SERPL-MCNC: 218 NG/ML

## 2019-09-10 DIAGNOSIS — F20.9 SCHIZOPHRENIA (H): ICD-10-CM

## 2019-09-10 DIAGNOSIS — Z79.899 LONG TERM USE OF DRUG: ICD-10-CM

## 2019-09-10 DIAGNOSIS — F20.0 PARANOID SCHIZOPHRENIA, SUBCHRONIC CONDITION WITH ACUTE EXACERBATION (H): ICD-10-CM

## 2019-09-10 LAB
BASOPHILS # BLD AUTO: 0 10E9/L (ref 0–0.2)
BASOPHILS NFR BLD AUTO: 0.2 %
DIFFERENTIAL METHOD BLD: ABNORMAL
EOSINOPHIL # BLD AUTO: 0 10E9/L (ref 0–0.7)
EOSINOPHIL NFR BLD AUTO: 0.3 %
ERYTHROCYTE [DISTWIDTH] IN BLOOD BY AUTOMATED COUNT: 13.5 % (ref 10–15)
HCT VFR BLD AUTO: 36.5 % (ref 35–47)
HGB BLD-MCNC: 12.3 G/DL (ref 11.7–15.7)
LYMPHOCYTES # BLD AUTO: 1.8 10E9/L (ref 0.8–5.3)
LYMPHOCYTES NFR BLD AUTO: 13.9 %
MCH RBC QN AUTO: 34.6 PG (ref 26.5–33)
MCHC RBC AUTO-ENTMCNC: 33.7 G/DL (ref 31.5–36.5)
MCV RBC AUTO: 103 FL (ref 78–100)
MONOCYTES # BLD AUTO: 0.9 10E9/L (ref 0–1.3)
MONOCYTES NFR BLD AUTO: 7.3 %
NEUTROPHILS # BLD AUTO: 9.9 10E9/L (ref 1.6–8.3)
NEUTROPHILS NFR BLD AUTO: 78.3 %
PLATELET # BLD AUTO: 363 10E9/L (ref 150–450)
RBC # BLD AUTO: 3.55 10E12/L (ref 3.8–5.2)
WBC # BLD AUTO: 12.7 10E9/L (ref 4–11)

## 2019-09-10 PROCEDURE — 36415 COLL VENOUS BLD VENIPUNCTURE: CPT | Performed by: NURSE PRACTITIONER

## 2019-09-10 PROCEDURE — 85025 COMPLETE CBC W/AUTO DIFF WBC: CPT | Performed by: NURSE PRACTITIONER

## 2019-09-10 PROCEDURE — 80159 DRUG ASSAY CLOZAPINE: CPT | Mod: 90 | Performed by: NURSE PRACTITIONER

## 2019-09-13 LAB
CLOZAPINE AND METABOLITES TOTAL: 692 NG/ML
CLOZAPINE SERPL-MCNC: 445 NG/ML
CLOZAPINE-N-OXIDE QUANT: <100 NG/ML
NORCLOZAPINE SERPL-MCNC: 247 NG/ML

## 2019-09-19 DIAGNOSIS — K59.09 CHRONIC CONSTIPATION: ICD-10-CM

## 2019-09-19 RX ORDER — POLYETHYLENE GLYCOL 3350 17 G/17G
POWDER, FOR SOLUTION ORAL
Qty: 527 G | Refills: 11 | Status: SHIPPED | OUTPATIENT
Start: 2019-09-19 | End: 2019-12-03

## 2019-09-19 NOTE — TELEPHONE ENCOUNTER
"Requested Prescriptions   Pending Prescriptions Disp Refills     GAVILAX powder [Pharmacy Med Name: GAVILAX 17G/DOSE POWDER] 527 g 1     Sig: Take 17 g (1 capful) by mouth daily       Laxatives Protocol Passed - 9/19/2019  9:51 AM        Passed - Patient is age 6 or older        Passed - Recent (12 mo) or future (30 days) visit within the authorizing provider's specialty     Patient had office visit in the last 12 months or has a visit in the next 30 days with authorizing provider or within the authorizing provider's specialty.  See \"Patient Info\" tab in inbasket, or \"Choose Columns\" in Meds & Orders section of the refill encounter.              Passed - Medication is active on med list        Last Written Prescription Date:  7/19/19  Last Fill Quantity: 527g,  # refills: 1   Last office visit: 7/30/2019 with prescribing provider:     Future Office Visit:      "

## 2019-10-08 DIAGNOSIS — F20.9 SCHIZOPHRENIA (H): ICD-10-CM

## 2019-10-08 DIAGNOSIS — Z79.899 LONG TERM USE OF DRUG: ICD-10-CM

## 2019-10-08 DIAGNOSIS — F20.0 PARANOID SCHIZOPHRENIA, SUBCHRONIC CONDITION WITH ACUTE EXACERBATION (H): ICD-10-CM

## 2019-10-08 LAB
BASOPHILS # BLD AUTO: 0 10E9/L (ref 0–0.2)
BASOPHILS NFR BLD AUTO: 0.4 %
DIFFERENTIAL METHOD BLD: ABNORMAL
EOSINOPHIL # BLD AUTO: 0.1 10E9/L (ref 0–0.7)
EOSINOPHIL NFR BLD AUTO: 1.4 %
ERYTHROCYTE [DISTWIDTH] IN BLOOD BY AUTOMATED COUNT: 14 % (ref 10–15)
HCT VFR BLD AUTO: 37.2 % (ref 35–47)
HGB BLD-MCNC: 12 G/DL (ref 11.7–15.7)
LYMPHOCYTES # BLD AUTO: 2.5 10E9/L (ref 0.8–5.3)
LYMPHOCYTES NFR BLD AUTO: 23.9 %
MCH RBC QN AUTO: 33.5 PG (ref 26.5–33)
MCHC RBC AUTO-ENTMCNC: 32.3 G/DL (ref 31.5–36.5)
MCV RBC AUTO: 104 FL (ref 78–100)
MONOCYTES # BLD AUTO: 0.7 10E9/L (ref 0–1.3)
MONOCYTES NFR BLD AUTO: 6.8 %
NEUTROPHILS # BLD AUTO: 6.9 10E9/L (ref 1.6–8.3)
NEUTROPHILS NFR BLD AUTO: 67.5 %
PLATELET # BLD AUTO: 358 10E9/L (ref 150–450)
RBC # BLD AUTO: 3.58 10E12/L (ref 3.8–5.2)
WBC # BLD AUTO: 10.3 10E9/L (ref 4–11)

## 2019-10-08 PROCEDURE — 85025 COMPLETE CBC W/AUTO DIFF WBC: CPT | Performed by: NURSE PRACTITIONER

## 2019-10-08 PROCEDURE — 80159 DRUG ASSAY CLOZAPINE: CPT | Mod: 90 | Performed by: NURSE PRACTITIONER

## 2019-10-08 PROCEDURE — 36415 COLL VENOUS BLD VENIPUNCTURE: CPT | Performed by: NURSE PRACTITIONER

## 2019-10-10 LAB
CLOZAPINE AND METABOLITES TOTAL: 914 NG/ML
CLOZAPINE SERPL-MCNC: 523 NG/ML
CLOZAPINE-N-OXIDE QUANT: <100 NG/ML
NORCLOZAPINE SERPL-MCNC: 391 NG/ML

## 2019-10-18 DIAGNOSIS — K59.09 CHRONIC CONSTIPATION: ICD-10-CM

## 2019-10-18 DIAGNOSIS — E03.9 HYPOTHYROIDISM, UNSPECIFIED TYPE: ICD-10-CM

## 2019-10-18 RX ORDER — SENNOSIDES 8.6 MG
TABLET ORAL
Qty: 200 TABLET | Refills: 0 | Status: SHIPPED | OUTPATIENT
Start: 2019-10-18 | End: 2019-12-16

## 2019-10-18 RX ORDER — LEVOTHYROXINE SODIUM 50 UG/1
TABLET ORAL
Qty: 90 TABLET | Refills: 0 | Status: SHIPPED | OUTPATIENT
Start: 2019-10-18 | End: 2019-12-03

## 2019-11-08 ENCOUNTER — TRANSFERRED RECORDS (OUTPATIENT)
Dept: HEALTH INFORMATION MANAGEMENT | Facility: CLINIC | Age: 49
End: 2019-11-08

## 2019-11-28 ENCOUNTER — TRANSFERRED RECORDS (OUTPATIENT)
Dept: HEALTH INFORMATION MANAGEMENT | Facility: CLINIC | Age: 49
End: 2019-11-28

## 2019-11-29 NOTE — PROGRESS NOTES
SUBJECTIVE   Doreen Gramajo is a  female who presents to clinic today for the following health issue(s):       Hospital Follow-up Visit:    Hospital/Nursing Home/IP Rehab Facility: [facility]  Date of Admission: 11/08/2019  Date of Discharge: 11/29/2019  Reason(s) for Admission: Depression, Suicidal ideation, hearing voices            Problems taking medications regularly:  None       Medication changes since discharge: None       Problems adhering to non-medication therapy:  None    Summary of hospitalization:  Discharge summary unavailable  Diagnostic Tests/Treatments reviewed.  Follow up needed:   Psychiatrist Dr. Leena Lino 12/3/19  at [facility] in [place], and therapist 12/5/19   PCP in 1 week  ECT at [facility] after discharge twice per week X 2 weeks. Then 1/week for 2 weeks. May need to discuss future ECT with her outpatient psychiatrist  Diet: regular: ful mix; limit solid fat (butter, margarine, shortening, get most of fats from fish, nuts, and vegetable oils    Started:  Hydroxyzine 50 mg e 6 hours PRN  Olanzapine 5 mg q 8 hrs PRN for agitation or hallucinations  Miralax 17 g BID  Clozapine 50 mg take 9 tabs at bedtime  Clozapine 200 mg take once daily  Pristiq 50 mg Take 2 tabs at bedtime  Levothyroxine 75 mcg Take 1 tab before breakfast    Constipation  They increased Miralax BID- too liquid  Miralax once daily with power pudding was working    Hypothyroidism  TSH 5.30 on 11/7/2019 at [facility]. Increased Levothyroxine to 75 mcg  Recheck TSH in Dec 17, 2019 lab only    Urinary incontinence  They stopped Vesicare- she's having more issues having to wake up to urinate  Vesicare started, and the group home watched very closely to look for any side effects, they found none.     TSH   Date Value Ref Range Status   07/30/2019 2.92 0.40 - 4.00 mU/L Final   09/04/2018 2.48 0.40 - 4.00 mU/L Final   08/14/2018 1.94 0.40 - 4.00 mU/L Final   10/10/2017 3.02 0.40 - 4.00 mU/L Final   04/25/2017 3.94  0.40 - 4.00 mU/L Final       Other Healthcare Providers Involved in Patient s Care:         Specialist appointment - see attached  Update since discharge: stable.     Post Discharge Medication Reconciliation: discharge medications reconciled and changed, per note/orders (see AVS).  Plan of care communicated with patient and other healthcare provider     Coding guidelines for this visit:  Type of Medical   Decision Making Face-to-Face Visit       within 7 Days of discharge Face-to-Face Visit        within 14 days of discharge   Moderate Complexity 14539 [id number]   High Complexity 53081 26820          Wt Readings from Last 10 Encounters:   12/03/19 60.3 kg (133 lb)   07/30/19 54.4 kg (120 lb)   12/11/18 65.3 kg (144 lb)   08/28/18 64 kg (141 lb)   07/24/18 64 kg (141 lb)   06/20/18 64.4 kg (142 lb)   05/17/18 63 kg (139 lb)   11/14/17 63 kg (139 lb)   07/18/17 69.9 kg (154 lb)   05/26/17 77.6 kg (171 lb)         ADDRESS ALL HEALTH MAINTENANCE NEEDS- Place orders as needed  Health Maintenance Due   Topic Date Due     ASTHMA ACTION PLAN  07/24/2019     INFLUENZA VACCINE (1) 09/01/2019       PCP   Josey Bonilla, [facility] [phone]    PROBLEM LIST        Patient Active Problem List   Diagnosis     Schizoaffective disorder, depressive type (H)     Hypothyroidism     Bipolar affective (H)     Gastroesophageal reflux disease without esophagitis     CARDIOVASCULAR SCREENING; LDL GOAL LESS THAN 160     Health Care Home     Psychosis (H)     Behavior disturbance     Cigarette nicotine dependence with nicotine-induced disorder     Iron deficiency     Chronic constipation     Urinary incontinence, unspecified type     Borderline intellectual functioning     Rectal prolapse     Extrapyramidal symptom     Anorexia     Disturbance of conduct     Lives in group home     Dry eyes     Reactive airway disease without complication     Headache associated with hormonal factors     ROBYN (generalized anxiety disorder)     Involuntary  "commitment     Paranoid schizophrenia, chronic condition with acute exacerbation (H)     Constipation, unspecified       MEDICATIONS        Current Outpatient Medications   Medication     albuterol (VENTOLIN HFA) 108 (90 Base) MCG/ACT inhaler     aspirin-acetaminophen-caffeine (EXCEDRIN MIGRAINE) 250-250-65 MG tablet     bisacodyl (DULCOLAX) 10 MG suppository     CERTAVITE/ANTIOXIDANTS tablet     cloZAPine (CLOZARIL) 200 MG tablet     desvenlafaxine (PRISTIQ) 100 MG 24 hr tablet     Ferrous Sulfate 324 (65 Fe) MG TBEC     HALOPERIDOL PO     HALOPERIDOL PO     levothyroxine (SYNTHROID/LEVOTHROID) 75 MCG tablet     pantoprazole (PROTONIX) 20 MG EC tablet     TRAZODONE HCL PO     GAVILAX powder     Incontinence Supply Disposable (DEPEND UNDERGARMENTS) [facility]     order for DME     sennosides (SENOKOT) 8.6 MG tablet     No current facility-administered medications for this visit.        Reviewed and updated as needed this visit by Provider:  Tobacco  Allergies  Meds  Med Hx  Surg Hx  Fam Hx  Soc Hx     ROS      Constitutional, HEENT, cardiovascular, pulmonary, gi and gu systems are negative, except as otherwise noted.    PHYSICAL EXAM   BP 96/64 (BP Location: Right arm, Patient Position: Sitting, Cuff Size: Adult Regular)   Pulse 84   Temp 97.9  F (36.6  C) (Tympanic)   Resp 16   Ht 1.575 m (5' 2\")   Wt 60.3 kg (133 lb)   SpO2 100%   BMI 24.33 kg/m    Body mass index is 24.33 kg/m .  GENERAL APPEARANCE: healthy, alert and no distress  RESP: lungs clear to auscultation - no rales, rhonchi or wheezes  CV: regular rates and rhythm, normal S1 S2, no S3 or S4 and no murmur, click or rub  ABDOMEN: soft, nontender, without hepatosplenomegaly or masses and bowel sounds normal  MS: extremities normal- no gross deformities noted  SKIN: no suspicious lesions or rashes  PSYCH: mentation appears normal and affect normal/bright    ASSESSMENT & PLAN     1. Schizoaffective disorder, depressive type (H)  Chronic, stable  - " Lithium level  Dr. Leena Lino 12/3/19  Check for release of information at visit  Therapist 12/5/19  Check for release of information at visit    2. Bipolar affective disorder, remission status unspecified (H)  Chronic, stable  -Restart vitamin (B COMPLEX-C) tablet; Take 1 tablet by mouth daily  Dispense: 90 tablet; Refill: 1  - Restart vitamin D3 (CHOLECALCIFEROL) 2000 units (50 mcg) tablet; Take 1 tablet (2,000 Units) by mouth daily    3. Urinary incontinence, unspecified type  Chronic, stable  -Restart solifenacin (VESICARE) 5 MG tablet; Take 1 tablet (5 mg) by mouth daily  Dispense: 90 tablet; Refill: 3    4. Hypothyroidism, unspecified type  Chronic,stable  - TSH with free T4 reflex; Future    5. Chronic constipation  Chronic, stable  -REDUCE polyethylene glycol (GAVILAX) powder; Take 17 g (1 capful) by mouth daily  Dispense: 527 g; Refill: 11  Powder pudding daily    6. Iron deficiency  Historical  We'll recheck levels today  -Restart  vitamin (B COMPLEX-C) tablet; Take 1 tablet by mouth daily  Dispense: 90 tablet; Refill: 1  - Ferritin  - Folate  - Iron and iron binding capacity  - Transferrin  - Vitamin B12    7. Sprain of interphalangeal joint of left middle finger, initial encounter  Acute, stable  Keep finger brace on during the day, take off at night  Range of motion at night, sock with rice or squeezy stress ball- squeeze 5 times daily        Risks, benefits, side effects and rationale for treatment plan fully discussed with the patient and understanding expressed.    Josey Bonilla, RENÉ-BC  [facility]

## 2019-12-03 ENCOUNTER — OFFICE VISIT (OUTPATIENT)
Dept: FAMILY MEDICINE | Facility: CLINIC | Age: 49
End: 2019-12-03
Payer: MEDICARE

## 2019-12-03 VITALS
HEIGHT: 62 IN | WEIGHT: 133 LBS | OXYGEN SATURATION: 100 % | SYSTOLIC BLOOD PRESSURE: 96 MMHG | DIASTOLIC BLOOD PRESSURE: 64 MMHG | RESPIRATION RATE: 16 BRPM | HEART RATE: 84 BPM | BODY MASS INDEX: 24.48 KG/M2 | TEMPERATURE: 97.9 F

## 2019-12-03 DIAGNOSIS — E03.9 HYPOTHYROIDISM, UNSPECIFIED TYPE: Chronic | ICD-10-CM

## 2019-12-03 DIAGNOSIS — F25.1 SCHIZOAFFECTIVE DISORDER, DEPRESSIVE TYPE (H): Primary | Chronic | ICD-10-CM

## 2019-12-03 DIAGNOSIS — K59.09 CHRONIC CONSTIPATION: ICD-10-CM

## 2019-12-03 DIAGNOSIS — E61.1 IRON DEFICIENCY: ICD-10-CM

## 2019-12-03 DIAGNOSIS — S63.633A SPRAIN OF INTERPHALANGEAL JOINT OF LEFT MIDDLE FINGER, INITIAL ENCOUNTER: ICD-10-CM

## 2019-12-03 DIAGNOSIS — R32 URINARY INCONTINENCE, UNSPECIFIED TYPE: ICD-10-CM

## 2019-12-03 DIAGNOSIS — F31.9 BIPOLAR AFFECTIVE DISORDER, REMISSION STATUS UNSPECIFIED (H): Chronic | ICD-10-CM

## 2019-12-03 LAB
FERRITIN SERPL-MCNC: 198 NG/ML (ref 8–252)
FOLATE SERPL-MCNC: 17.2 NG/ML
IRON SATN MFR SERPL: 33 % (ref 15–46)
IRON SERPL-MCNC: 104 UG/DL (ref 35–180)
LITHIUM SERPL-SCNC: <0.2 MMOL/L (ref 0.6–1.2)
TIBC SERPL-MCNC: 314 UG/DL (ref 240–430)
VIT B12 SERPL-MCNC: 585 PG/ML (ref 193–986)

## 2019-12-03 PROCEDURE — 82607 VITAMIN B-12: CPT | Performed by: NURSE PRACTITIONER

## 2019-12-03 PROCEDURE — 99214 OFFICE O/P EST MOD 30 MIN: CPT | Performed by: NURSE PRACTITIONER

## 2019-12-03 PROCEDURE — 82746 ASSAY OF FOLIC ACID SERUM: CPT | Performed by: NURSE PRACTITIONER

## 2019-12-03 PROCEDURE — 82728 ASSAY OF FERRITIN: CPT | Performed by: NURSE PRACTITIONER

## 2019-12-03 PROCEDURE — 84466 ASSAY OF TRANSFERRIN: CPT | Performed by: NURSE PRACTITIONER

## 2019-12-03 PROCEDURE — 36415 COLL VENOUS BLD VENIPUNCTURE: CPT | Performed by: NURSE PRACTITIONER

## 2019-12-03 PROCEDURE — 80178 ASSAY OF LITHIUM: CPT | Performed by: NURSE PRACTITIONER

## 2019-12-03 PROCEDURE — 83540 ASSAY OF IRON: CPT | Performed by: NURSE PRACTITIONER

## 2019-12-03 PROCEDURE — 83550 IRON BINDING TEST: CPT | Performed by: NURSE PRACTITIONER

## 2019-12-03 RX ORDER — SOLIFENACIN SUCCINATE 5 MG/1
5 TABLET, FILM COATED ORAL DAILY
Qty: 90 TABLET | Refills: 3 | Status: SHIPPED | OUTPATIENT
Start: 2019-12-03 | End: 2020-09-22

## 2019-12-03 RX ORDER — LEVOTHYROXINE SODIUM 75 UG/1
75 TABLET ORAL DAILY
COMMUNITY
Start: 2019-11-27 | End: 2019-12-12

## 2019-12-03 RX ORDER — MULTIVITAMIN WITH IRON
1 TABLET ORAL DAILY
Qty: 90 TABLET | Refills: 1 | COMMUNITY
Start: 2019-12-03

## 2019-12-03 RX ORDER — POLYETHYLENE GLYCOL 3350 17 G/17G
POWDER, FOR SOLUTION ORAL
Qty: 527 G | Refills: 11 | Status: SHIPPED | OUTPATIENT
Start: 2019-12-03

## 2019-12-03 RX ORDER — OLANZAPINE 5 MG/1
5 TABLET ORAL EVERY 8 HOURS PRN
COMMUNITY
End: 2020-08-12

## 2019-12-03 RX ORDER — CHOLECALCIFEROL (VITAMIN D3) 50 MCG
1 TABLET ORAL DAILY
COMMUNITY
Start: 2019-12-03

## 2019-12-03 RX ORDER — HYDROXYZINE HYDROCHLORIDE 50 MG/1
50 TABLET, FILM COATED ORAL EVERY 6 HOURS PRN
COMMUNITY
End: 2020-08-12

## 2019-12-03 ASSESSMENT — MIFFLIN-ST. JEOR: SCORE: 1181.53

## 2019-12-03 NOTE — PATIENT INSTRUCTIONS
1. Schizoaffective disorder, depressive type (H)  Chronic, stable  - Lithium level  Dr. Leena Lino 12/3/19  Check for release of information at visit  Therapist 12/5/19  Check for release of information at visit    2. Bipolar affective disorder, remission status unspecified (H)  Chronic, stable  -Restart vitamin (B COMPLEX-C) tablet; Take 1 tablet by mouth daily  Dispense: 90 tablet; Refill: 1  - Restart vitamin D3 (CHOLECALCIFEROL) 2000 units (50 mcg) tablet; Take 1 tablet (2,000 Units) by mouth daily    3. Urinary incontinence, unspecified type  Chronic, stable  -Restart solifenacin (VESICARE) 5 MG tablet; Take 1 tablet (5 mg) by mouth daily  Dispense: 90 tablet; Refill: 3    4. Hypothyroidism, unspecified type  Chronic,stable  - TSH with free T4 reflex; Future    5. Chronic constipation  Chronic, stable  -REDUCE polyethylene glycol (GAVILAX) powder; Take 17 g (1 capful) by mouth daily  Dispense: 527 g; Refill: 11  Powder pudding daily    6. Iron deficiency  Historical  We'll recheck levels today  -Restart  vitamin (B COMPLEX-C) tablet; Take 1 tablet by mouth daily  Dispense: 90 tablet; Refill: 1  - Ferritin  - Folate  - Iron and iron binding capacity  - Transferrin  - Vitamin B12    7. Sprain of interphalangeal joint of left middle finger, initial encounter  Acute, stable  Keep finger brace on during the day, take off at night  Range of motion at night, sock with rice or squeezy stress ball- squeeze 5 times daily

## 2019-12-03 NOTE — LETTER
December 4, 2019      Doreen Gramajo  [address] [facility] [address]        Dear ,    We are writing to inform you of your test results.    Transferrin-normal   Vit B12-normal   Folate-normal   Ferritin-normal   CBC-normal   Iron and Iron binding capacity-normal   Lithium level-low (as expected off the medication)     Resulted Orders   Lithium level   Result Value Ref Range    Lithium Level <0.20 (L) 0.60 - 1.20 mmol/L   Ferritin   Result Value Ref Range    Ferritin 198 8 - 252 ng/mL   Folate   Result Value Ref Range    Folate 17.2 >5.4 ng/mL   Iron and iron binding capacity   Result Value Ref Range    Iron 104 35 - 180 ug/dL    Iron Binding Cap 314 240 - 430 ug/dL    Iron Saturation Index 33 15 - 46 %   Transferrin   Result Value Ref Range    Transferrin 250 210 - 360 mg/dL   Vitamin B12   Result Value Ref Range    Vitamin B12 585 193 - 986 pg/mL       If you have any questions or concerns, please call the clinic at the number listed above.       Sincerely,        Josey Bonilla, CNP/CB

## 2019-12-03 NOTE — NURSING NOTE
"Chief Complaint   Patient presents with     Hospital F/U       Initial BP 96/64 (BP Location: Right arm, Patient Position: Sitting, Cuff Size: Adult Regular)   Pulse 84   Temp 97.9  F (36.6  C) (Tympanic)   Resp 16   Ht 1.575 m (5' 2\")   Wt 60.3 kg (133 lb)   SpO2 100%   BMI 24.33 kg/m   Estimated body mass index is 24.33 kg/m  as calculated from the following:    Height as of this encounter: 1.575 m (5' 2\").    Weight as of this encounter: 60.3 kg (133 lb).    Patient presents to the clinic using No DME    Health Maintenance that is potentially due pending provider review:  NONE    n/a    Is there anyone who you would like to be able to receive your results? No  If yes have patient fill out WOODY    "

## 2019-12-04 LAB — TRANSFERRIN SERPL-MCNC: 250 MG/DL (ref 210–360)

## 2019-12-04 NOTE — RESULT ENCOUNTER NOTE
Transferrin-normal  Vit B12-normal  Folate-normal  Ferritin-normal  CBC-normal  Iron and Iron binding capacity-normal  Lithium level-low (as expected off the medication)  Please call her with these results. Send a hard copy for their records.   Thanks. RENÉ Nowak

## 2019-12-12 DIAGNOSIS — E03.9 HYPOTHYROIDISM, UNSPECIFIED TYPE: Primary | ICD-10-CM

## 2019-12-12 RX ORDER — LEVOTHYROXINE SODIUM 75 UG/1
75 TABLET ORAL DAILY
Qty: 30 TABLET | Refills: 0 | Status: SHIPPED | OUTPATIENT
Start: 2019-12-12 | End: 2019-12-17

## 2019-12-12 NOTE — TELEPHONE ENCOUNTER
"Requested Prescriptions   Pending Prescriptions Disp Refills     levothyroxine (SYNTHROID/LEVOTHROID) 75 MCG tablet       Sig: Take 1 tablet (75 mcg) by mouth daily       Thyroid Protocol Passed - 12/12/2019  2:37 PM        Passed - Patient is 12 years or older        Passed - Recent (12 mo) or future (30 days) visit within the authorizing provider's specialty     Patient has had an office visit with the authorizing provider or a provider within the authorizing providers department within the previous 12 mos or has a future within next 30 days. See \"Patient Info\" tab in inbasket, or \"Choose Columns\" in Meds & Orders section of the refill encounter.              Passed - Medication is active on med list        Passed - Normal TSH on file in past 12 months     Recent Labs   Lab Test 07/30/19  1205   TSH 2.92              Passed - No active pregnancy on record     If patient is pregnant or has had a positive pregnancy test, please check TSH.          Passed - No positive pregnancy test in past 12 months     If patient is pregnant or has had a positive pregnancy test, please check TSH.            Medication list as reported by patient now would like new prescription  Last office visit 12/3/19    "

## 2019-12-12 NOTE — TELEPHONE ENCOUNTER
Last OV 12-3-19. (per note below)  Ratna Alejo RN    TSH 5.30 on 11/7/2019 at [facility]. Increased Levothyroxine to 75 mcg  Recheck TSH in Dec 17, 2019 lab only  Pt due for TSH level        Levothyroxine 75 mcg Take 1 tab before breakfast  TSH   Date Value Ref Range Status   07/30/2019 2.92 0.40 - 4.00 mU/L Final

## 2019-12-16 DIAGNOSIS — K59.09 CHRONIC CONSTIPATION: ICD-10-CM

## 2019-12-16 RX ORDER — SENNOSIDES 8.6 MG
TABLET ORAL
Qty: 180 TABLET | Refills: 3 | Status: SHIPPED | OUTPATIENT
Start: 2019-12-16 | End: 2020-03-21

## 2019-12-16 NOTE — TELEPHONE ENCOUNTER
Requested Prescriptions   Pending Prescriptions Disp Refills     sennosides (SENOKOT) 8.6 MG tablet 200 tablet 0       There is no refill protocol information for this order        Last Written Prescription Date:  10/18/19  Last Fill Quantity: 200,  # refills: 0   Last office visit: 12/3/2019 with prescribing provider:     Future Office Visit:      Routing refill request to provider for review/approval because:  Drug not on the [facility], [facility] or [facility] refill protocol or controlled substance

## 2019-12-17 DIAGNOSIS — E03.9 HYPOTHYROIDISM, UNSPECIFIED TYPE: Chronic | ICD-10-CM

## 2019-12-17 DIAGNOSIS — E03.9 HYPOTHYROIDISM, UNSPECIFIED TYPE: ICD-10-CM

## 2019-12-17 LAB — TSH SERPL DL<=0.005 MIU/L-ACNC: 2.12 MU/L (ref 0.4–4)

## 2019-12-17 PROCEDURE — 36415 COLL VENOUS BLD VENIPUNCTURE: CPT | Performed by: NURSE PRACTITIONER

## 2019-12-17 PROCEDURE — 84443 ASSAY THYROID STIM HORMONE: CPT | Performed by: NURSE PRACTITIONER

## 2019-12-17 RX ORDER — LEVOTHYROXINE SODIUM 75 UG/1
75 TABLET ORAL DAILY
Qty: 90 TABLET | Refills: 3 | Status: SHIPPED | OUTPATIENT
Start: 2019-12-17 | End: 2020-10-21

## 2019-12-17 NOTE — LETTER
December 17, 2019      Dore[address]ky Perkinsch  [address] [facility] [address]        Dear ,    We are writing to inform you of your test results.    TSH- normal. Refilled Levothyroxine for a year.     Resulted Orders   TSH with free T4 reflex   Result Value Ref Range    TSH 2.12 0.40 - 4.00 mU/L       If you have any questions or concerns, please call the clinic at the number listed above.       Sincerely,        Josey Bonilla, CNP/cb

## 2019-12-17 NOTE — RESULT ENCOUNTER NOTE
TSH- normal. Refilled Levothyroxine for a year.  Group home  Please call her with these results. Send a hard copy for their records.   Thanks. RENÉ Nowak

## 2019-12-31 DIAGNOSIS — K21.9 GASTROESOPHAGEAL REFLUX DISEASE WITHOUT ESOPHAGITIS: ICD-10-CM

## 2019-12-31 DIAGNOSIS — F25.9 SCHIZOAFFECTIVE DISORDER, UNSPECIFIED TYPE (H): Chronic | ICD-10-CM

## 2019-12-31 RX ORDER — PANTOPRAZOLE SODIUM 20 MG/1
20 TABLET, DELAYED RELEASE ORAL DAILY
Qty: 90 TABLET | Refills: 1 | Status: SHIPPED | OUTPATIENT
Start: 2019-12-31 | End: 2020-05-15

## 2019-12-31 RX ORDER — TRAZODONE HYDROCHLORIDE 100 MG/1
200 TABLET ORAL AT BEDTIME
OUTPATIENT
Start: 2019-12-31

## 2019-12-31 RX ORDER — MULTIPLE VITAMINS W/ MINERALS TAB 9MG-400MCG
1 TAB ORAL DAILY
Qty: 30 TABLET | Refills: 5 | Status: SHIPPED | OUTPATIENT
Start: 2019-12-31 | End: 2020-05-15

## 2019-12-31 NOTE — TELEPHONE ENCOUNTER
"This patient wants a new prescription for TRAZODONE HCL PO    Requested Prescriptions   Pending Prescriptions Disp Refills     traZODone (DESYREL) 100 MG tablet       Sig: Take 2 tablets (200 mg) by mouth At Bedtime       Serotonin Modulators Passed - 12/31/2019  2:44 PM        Passed - Recent (12 mo) or future (30 days) visit within the authorizing provider's specialty     Patient has had an office visit with the authorizing provider or a provider within the authorizing providers department within the previous 12 mos or has a future within next 30 days. See \"Patient Info\" tab in inbasket, or \"Choose Columns\" in Meds & Orders section of the refill encounter.              Passed - Medication is active on med list        Passed - Patient is age 18 or older        Passed - No active pregnancy on record        Passed - No positive pregnancy test in past 12 months            "

## 2019-12-31 NOTE — TELEPHONE ENCOUNTER
"Requested Prescriptions   Pending Prescriptions Disp Refills     multivitamin w/minerals (CERTAVITE/ANTIOXIDANTS) tablet 30 tablet 5     Sig: Take 1 tablet by mouth daily       Vitamin Supplements (Adult) Protocol Passed - 12/31/2019  2:43 PM        Passed - High dose Vitamin D not ordered        Passed - Recent (12 mo) or future (30 days) visit within the authorizing provider's specialty     Patient has had an office visit with the authorizing provider or a provider within the authorizing providers department within the previous 12 mos or has a future within next 30 days. See \"Patient Info\" tab in inbasket, or \"Choose Columns\" in Meds & Orders section of the refill encounter.              Passed - Medication is active on med list        Last Written Prescription Date:  9/19/19  Last Fill Quantity: 30,  # refills: 5   Last office visit: 12/3/2019 with prescribing provider:     Future Office Visit:      "

## 2019-12-31 NOTE — TELEPHONE ENCOUNTER
"Requested Prescriptions   Pending Prescriptions Disp Refills     pantoprazole (PROTONIX) 20 MG EC tablet 90 tablet 1     Sig: Take 1 tablet (20 mg) by mouth daily       PPI Protocol Passed - 12/31/2019  2:41 PM        Passed - Not on Clopidogrel (unless Pantoprazole ordered)        Passed - No diagnosis of osteoporosis on record        Passed - Recent (12 mo) or future (30 days) visit within the authorizing provider's specialty     Patient has had an office visit with the authorizing provider or a provider within the authorizing providers department within the previous 12 mos or has a future within next 30 days. See \"Patient Info\" tab in inbasket, or \"Choose Columns\" in Meds & Orders section of the refill encounter.              Passed - Medication is active on med list        Passed - Patient is age 18 or older        Passed - No active pregnacy on record        Passed - No positive pregnancy test in past 12 months        Last Written Prescription Date:  5/28/19  Last Fill Quantity: 90,  # refills: 1   Last office visit: 12/3/2019 with prescribing provider:     Future Office Visit:      "

## 2020-01-27 DIAGNOSIS — Z79.899 ENCOUNTER FOR LONG-TERM (CURRENT) USE OF OTHER MEDICATIONS: ICD-10-CM

## 2020-01-27 DIAGNOSIS — F20.9 SUBCHRONIC SCHIZOPHRENIA (H): Primary | ICD-10-CM

## 2020-01-27 LAB
ANION GAP SERPL CALCULATED.3IONS-SCNC: 5 MMOL/L (ref 3–14)
BUN SERPL-MCNC: 22 MG/DL (ref 7–30)
CALCIUM SERPL-MCNC: 9.5 MG/DL (ref 8.5–10.1)
CHLORIDE SERPL-SCNC: 105 MMOL/L (ref 94–109)
CO2 SERPL-SCNC: 27 MMOL/L (ref 20–32)
CREAT SERPL-MCNC: 1 MG/DL (ref 0.52–1.04)
GFR SERPL CREATININE-BSD FRML MDRD: 66 ML/MIN/{1.73_M2}
GLUCOSE SERPL-MCNC: 83 MG/DL (ref 70–99)
LITHIUM SERPL-SCNC: 0.85 MMOL/L (ref 0.6–1.2)
POTASSIUM SERPL-SCNC: 3.8 MMOL/L (ref 3.4–5.3)
SODIUM SERPL-SCNC: 137 MMOL/L (ref 133–144)
T4 FREE SERPL-MCNC: 0.95 NG/DL (ref 0.76–1.46)
TSH SERPL DL<=0.005 MIU/L-ACNC: 2.51 MU/L (ref 0.4–4)

## 2020-01-27 PROCEDURE — 80178 ASSAY OF LITHIUM: CPT

## 2020-01-27 PROCEDURE — 36415 COLL VENOUS BLD VENIPUNCTURE: CPT

## 2020-01-27 PROCEDURE — 84439 ASSAY OF FREE THYROXINE: CPT

## 2020-01-27 PROCEDURE — 80048 BASIC METABOLIC PNL TOTAL CA: CPT

## 2020-01-27 PROCEDURE — 84443 ASSAY THYROID STIM HORMONE: CPT

## 2020-02-24 DIAGNOSIS — K59.09 CHRONIC CONSTIPATION: ICD-10-CM

## 2020-02-24 NOTE — TELEPHONE ENCOUNTER
Requested Prescriptions   Pending Prescriptions Disp Refills     sennosides (SENOKOT) 8.6 MG tablet [Pharmacy Med Name: senna 8.6 mg tablet] 180 tablet 3     Sig: Take 3 tablet by mouth twice daily       There is no refill protocol information for this order      Last Written Prescription Date:  12/16/2019  Last Fill Quantity: 180,  # refills: 3   Last office visit: 12/3/2019 with prescribing provider:     Future Office Visit:

## 2020-02-25 RX ORDER — SENNOSIDES 8.6 MG
TABLET ORAL
Qty: 180 TABLET | Refills: 3 | OUTPATIENT
Start: 2020-02-25

## 2020-03-20 DIAGNOSIS — K59.09 CHRONIC CONSTIPATION: ICD-10-CM

## 2020-03-21 RX ORDER — SENNOSIDES 8.6 MG
TABLET ORAL
Qty: 180 TABLET | Refills: 3 | Status: SHIPPED | OUTPATIENT
Start: 2020-03-21 | End: 2020-06-10

## 2020-03-21 NOTE — TELEPHONE ENCOUNTER
Requested Prescriptions   Pending Prescriptions Disp Refills     sennosides (SENOKOT) 8.6 MG tablet [Pharmacy Med Name: senna 8.6 mg tablet] 180 tablet 3     Sig: Take 3 tablet by mouth twice daily       There is no refill protocol information for this order        Last Written Prescription Date:  12/16/18  Last Fill Quantity: 180,  # refills: 3   Last office visit: 12/3/2019 with prescribing provider:     Future Office Visit:      Routing refill request to provider for review/approval because:  Drug not on the [facility], P or [facility] refill protocol or controlled substance

## 2020-05-15 DIAGNOSIS — K21.9 GASTROESOPHAGEAL REFLUX DISEASE WITHOUT ESOPHAGITIS: ICD-10-CM

## 2020-05-15 DIAGNOSIS — F25.9 SCHIZOAFFECTIVE DISORDER, UNSPECIFIED TYPE (H): Chronic | ICD-10-CM

## 2020-05-15 RX ORDER — FOLIC ACID/MV,IRON,MIN/LUTEIN 0.4-18-25
TABLET ORAL
Qty: 30 TABLET | Refills: 1 | Status: SHIPPED | OUTPATIENT
Start: 2020-05-15 | End: 2020-07-06

## 2020-05-15 RX ORDER — PANTOPRAZOLE SODIUM 20 MG/1
TABLET, DELAYED RELEASE ORAL
Qty: 30 TABLET | Refills: 1 | Status: SHIPPED | OUTPATIENT
Start: 2020-05-15 | End: 2020-07-06

## 2020-06-10 DIAGNOSIS — E61.1 IRON DEFICIENCY: ICD-10-CM

## 2020-06-10 DIAGNOSIS — K59.09 CHRONIC CONSTIPATION: ICD-10-CM

## 2020-06-10 RX ORDER — FERROUS SULFATE 325(65) MG
TABLET ORAL
Qty: 100 TABLET | Refills: 3 | Status: SHIPPED | OUTPATIENT
Start: 2020-06-10 | End: 2020-08-12

## 2020-06-10 RX ORDER — SENNOSIDES 8.6 MG
TABLET ORAL
Qty: 180 TABLET | Refills: 3 | Status: SHIPPED | OUTPATIENT
Start: 2020-06-10 | End: 2020-10-21

## 2020-07-01 ENCOUNTER — TELEPHONE (OUTPATIENT)
Dept: FAMILY MEDICINE | Facility: CLINIC | Age: 50
End: 2020-07-01

## 2020-07-01 DIAGNOSIS — R32 URINARY INCONTINENCE, UNSPECIFIED TYPE: Primary | ICD-10-CM

## 2020-07-01 DIAGNOSIS — F25.1 SCHIZOAFFECTIVE DISORDER, DEPRESSIVE TYPE (H): Chronic | ICD-10-CM

## 2020-07-01 DIAGNOSIS — R41.83 BORDERLINE INTELLECTUAL FUNCTIONING: Chronic | ICD-10-CM

## 2020-07-01 NOTE — TELEPHONE ENCOUNTER
TR as to [employer] individual orders?      Reason for Call: Request for an order or referral:    Order or referral being requested: Incontinence products up to 360 per Month   1) Briefs / Pull Ups  2) Gloves    3) Chux's Pads    Please give signed orders to [facility]    Date needed: as soon as possible    Has the patient been seen by the PCP for this problem? YES    Phone number Patient can be reached at:  Other phone number:  [facility] [phone]*    Best Time:  Any Time      Can we leave a detailed message on this number?  YES    Call taken on 7/1/2020 at 10:17 AM by Nena Nielson

## 2020-07-02 NOTE — TELEPHONE ENCOUNTER
Per Mildred   Pt needs Amount of refills or PRN on these scripts  Refax order.  Nena [facility]

## 2020-07-03 DIAGNOSIS — K21.9 GASTROESOPHAGEAL REFLUX DISEASE WITHOUT ESOPHAGITIS: ICD-10-CM

## 2020-07-03 DIAGNOSIS — F25.9 SCHIZOAFFECTIVE DISORDER, UNSPECIFIED TYPE (H): Chronic | ICD-10-CM

## 2020-07-06 RX ORDER — FOLIC ACID/MV,IRON,MIN/LUTEIN 0.4-18-25
TABLET ORAL
Qty: 30 TABLET | Refills: 1 | Status: SHIPPED | OUTPATIENT
Start: 2020-07-06 | End: 2020-09-22

## 2020-07-06 RX ORDER — PANTOPRAZOLE SODIUM 20 MG/1
TABLET, DELAYED RELEASE ORAL
Qty: 30 TABLET | Refills: 1 | Status: SHIPPED | OUTPATIENT
Start: 2020-07-06 | End: 2020-09-22

## 2020-07-10 ENCOUNTER — TELEPHONE (OUTPATIENT)
Dept: FAMILY MEDICINE | Facility: CLINIC | Age: 50
End: 2020-07-10

## 2020-07-10 DIAGNOSIS — R32 URINARY INCONTINENCE, UNSPECIFIED TYPE: Primary | ICD-10-CM

## 2020-07-10 DIAGNOSIS — Z74.1 ASSISTANCE NEEDED FOR PERSONAL HYGIENE: ICD-10-CM

## 2020-07-10 NOTE — TELEPHONE ENCOUNTER
Patient needs a signed order for - Incontinence Products - up to 360 per month and gloves.  Please include refill amounts or PRN. Please include all associated diagnosis codes.  Also include amount for refills for a full year    [facility] - - [phone]  - [phone].

## 2020-07-15 ENCOUNTER — TELEPHONE (OUTPATIENT)
Dept: FAMILY MEDICINE | Facility: CLINIC | Age: 50
End: 2020-07-15

## 2020-07-15 DIAGNOSIS — Z74.1 ASSISTANCE NEEDED FOR PERSONAL HYGIENE: ICD-10-CM

## 2020-07-15 DIAGNOSIS — R32 URINARY INCONTINENCE, UNSPECIFIED TYPE: ICD-10-CM

## 2020-07-15 NOTE — TELEPHONE ENCOUNTER
Medical supply received fax for her Incontinent products. She says it only had one refill and she needs this for one year.    Please fax back to them at [phone]

## 2020-07-27 ENCOUNTER — TELEPHONE (OUTPATIENT)
Dept: FAMILY MEDICINE | Facility: CLINIC | Age: 50
End: 2020-07-27

## 2020-07-27 NOTE — TELEPHONE ENCOUNTER
Reason for Call: Request for an order or referral:    Order or referral being requested: [facility] [place] [employer] is calling regarding order and Diagnosis code for Gloves  Unable to use [employer] Unspecified type R32.  Cannot have this even on the order. Requesting new order with Assistance needed for Personal Hygiene only on the order Z74.1  Refax order to [place] [employer]  [phone]    Date needed: as soon as possible    Has the patient been seen by the PCP for this problem? YES    Phone number Patient can be reached at:  Home number on file [phone] (home)    Best Time:  Any Time      Can we leave a detailed message on this number?  YES    Call taken on 7/27/2020 at 1:14 PM by Nena Nielson

## 2020-08-12 ENCOUNTER — OFFICE VISIT (OUTPATIENT)
Dept: FAMILY MEDICINE | Facility: CLINIC | Age: 50
End: 2020-08-12
Payer: MEDICARE

## 2020-08-12 VITALS
WEIGHT: 127 LBS | HEIGHT: 62 IN | TEMPERATURE: 97.6 F | OXYGEN SATURATION: 99 % | DIASTOLIC BLOOD PRESSURE: 58 MMHG | RESPIRATION RATE: 20 BRPM | BODY MASS INDEX: 23.37 KG/M2 | HEART RATE: 94 BPM | SYSTOLIC BLOOD PRESSURE: 88 MMHG

## 2020-08-12 DIAGNOSIS — E61.1 IRON DEFICIENCY: ICD-10-CM

## 2020-08-12 DIAGNOSIS — F25.1 SCHIZOAFFECTIVE DISORDER, DEPRESSIVE TYPE (H): Primary | Chronic | ICD-10-CM

## 2020-08-12 DIAGNOSIS — R63.0 ANOREXIA: Chronic | ICD-10-CM

## 2020-08-12 DIAGNOSIS — L84 CORN OR CALLUS: ICD-10-CM

## 2020-08-12 DIAGNOSIS — F25.9 SCHIZOAFFECTIVE DISORDER, UNSPECIFIED TYPE (H): Chronic | ICD-10-CM

## 2020-08-12 DIAGNOSIS — Z79.899 ENCOUNTER FOR LONG-TERM (CURRENT) USE OF OTHER MEDICATIONS: Primary | ICD-10-CM

## 2020-08-12 DIAGNOSIS — F41.1 GAD (GENERALIZED ANXIETY DISORDER): Chronic | ICD-10-CM

## 2020-08-12 DIAGNOSIS — F31.9 BIPOLAR AFFECTIVE DISORDER, REMISSION STATUS UNSPECIFIED (H): Chronic | ICD-10-CM

## 2020-08-12 DIAGNOSIS — I95.9 HYPOTENSION, UNSPECIFIED HYPOTENSION TYPE: ICD-10-CM

## 2020-08-12 DIAGNOSIS — B07.8 COMMON WART: ICD-10-CM

## 2020-08-12 PROBLEM — Z78.9 LIVES IN GROUP HOME: Chronic | Status: ACTIVE | Noted: 2018-07-24

## 2020-08-12 LAB — LITHIUM SERPL-SCNC: 1.08 MMOL/L (ref 0.6–1.2)

## 2020-08-12 PROCEDURE — 99214 OFFICE O/P EST MOD 30 MIN: CPT | Performed by: NURSE PRACTITIONER

## 2020-08-12 PROCEDURE — 36415 COLL VENOUS BLD VENIPUNCTURE: CPT | Performed by: NURSE PRACTITIONER

## 2020-08-12 PROCEDURE — 80178 ASSAY OF LITHIUM: CPT | Performed by: NURSE PRACTITIONER

## 2020-08-12 PROCEDURE — 93000 ELECTROCARDIOGRAM COMPLETE: CPT | Performed by: NURSE PRACTITIONER

## 2020-08-12 RX ORDER — DIPHENHYDRAMINE HCL 50 MG
50 CAPSULE ORAL
COMMUNITY

## 2020-08-12 RX ORDER — LITHIUM CARBONATE 300 MG/1
300 CAPSULE ORAL 2 TIMES DAILY
COMMUNITY
Start: 2020-06-10

## 2020-08-12 RX ORDER — LORAZEPAM 1 MG/1
1 TABLET ORAL
COMMUNITY

## 2020-08-12 RX ORDER — HALOPERIDOL DECANOATE 100 MG/ML
100 INJECTION INTRAMUSCULAR
COMMUNITY

## 2020-08-12 ASSESSMENT — ENCOUNTER SYMPTOMS
HEARTBURN: 0
HEADACHES: 0
FREQUENCY: 0
COUGH: 0
SHORTNESS OF BREATH: 0
BREAST MASS: 0
WEAKNESS: 0
HEMATURIA: 0
SORE THROAT: 0
MYALGIAS: 0
CHILLS: 0
NERVOUS/ANXIOUS: 1
DYSURIA: 0
ARTHRALGIAS: 0
ABDOMINAL PAIN: 0
JOINT SWELLING: 0
PARESTHESIAS: 0
DIARRHEA: 0
HEMATOCHEZIA: 0
EYE PAIN: 0
FEVER: 0
DIZZINESS: 1
NAUSEA: 0
CONSTIPATION: 0
PALPITATIONS: 0

## 2020-08-12 ASSESSMENT — ACTIVITIES OF DAILY LIVING (ADL)
CURRENT_FUNCTION: TRANSPORTATION REQUIRES ASSISTANCE
CURRENT_FUNCTION: PREPARING MEALS REQUIRES ASSISTANCE
CURRENT_FUNCTION: SHOPPING REQUIRES ASSISTANCE
CURRENT_FUNCTION: LAUNDRY REQUIRES ASSISTANCE
CURRENT_FUNCTION: MEDICATION ADMINISTRATION REQUIRES ASSISTANCE

## 2020-08-12 ASSESSMENT — MIFFLIN-ST. JEOR: SCORE: 1154.32

## 2020-08-12 NOTE — NURSING NOTE
"Chief Complaint   Patient presents with     Physical     See scanned copies       Initial BP (!) 88/58   Pulse 94   Temp 97.6  F (36.4  C) (Tympanic)   Resp 20   Ht 1.575 m (5' 2\")   Wt 57.6 kg (127 lb)   SpO2 99%   BMI 23.23 kg/m   Estimated body mass index is 23.23 kg/m  as calculated from the following:    Height as of this encounter: 1.575 m (5' 2\").    Weight as of this encounter: 57.6 kg (127 lb).    Patient presents to the clinic using No DME    Health Maintenance that is potentially due pending provider review:  Mammogram    Pt declines to have mammogram.    Is there anyone who you would like to be able to receive your results? No  If yes have patient fill out WOODY    "

## 2020-08-12 NOTE — PATIENT INSTRUCTIONS
1. Schizoaffective disorder, depressive type (H)  Chronic, stable  Follow with Dr. Schoenecker   - EKG 12-lead complete w/read - Clinics    2. Bipolar affective disorder, remission status unspecified (H)  Chronic, stable  Follow with Dr. Schoenecker     3. Schizoaffective disorder, unspecified type (H)  Chronic, stable  Follow with Dr. Schoenecker     4. ROBYN (generalized anxiety disorder)  Chronic, stable  Follow with Dr. Schoenecker     5. Iron deficiency d/t anorexia  Historical  STOP iron supplement  Recheck anemia labs in 3 months with a lab only appointment  - CBC with platelets; Future  - Ferritin; Future  - Iron and iron binding capacity; Future  - Vitamin B12; Future  - Folate; Future      6. H/O Anorexia  Historical    7. Corn or callus  Acute, stable  Use shoes with wide toe box  Pare down callus regularly    8. Common wart  Acute, stable  Use OTC Duo film as directed on package  -salicylic acid (DUOFILM) 17 % external solution; Apply topically 2 times dailyup in 1-2 month if no improvement    9. Hypotension, unspecified hypotension type  Acute, stable  - EKG 12-lead complete w/read - Clinics  Follow with Dr. Schoenecker and discuss: Clozapine, Haldol, Lorazapam, and Trazodone (known side effect of these is hypotension)          Patient Education     Treating Corns and Calluses  If your corns or calluses are mild, reducing friction may help. Different shoes, moleskin patches, or soft pads may be all the treatment you need. In more severe cases, treating tissue buildup may require your doctor s care. Sometimes custom-made shoe inserts (orthotics) or special pads are prescribed to reduce friction and pressure.     Doctor examining senior man's foot.   Change shoes  If you have corns, your doctor may suggest wearing shoes that have more toe room. This way, buckled joints are less likely to be pinched against the top of the shoe. If you have calluses, wearing a cushioned insole, arch support, or heel counter  can help reduce friction.  Visit your doctor  In some cases, your doctor may trim away the outer layers of skin that make up the corn or callus. For a painful corn, medicine may be injected beneath the built-up tissue.  Wear orthotics  Orthotics are specially made to meet the needs of your feet. They cushion calluses or divert pressure away from these problem areas. Worn as directed, orthotics help limit existing problems and prevent new ones from forming.  If you need surgery  If a bone or joint is out of place, certain parts of your foot may be under too much pressure. This can cause severe corns and calluses. In such cases, surgery may be the best way to correct the problem.  Outpatient procedures  In most cases, surgery to improve bone position is an outpatient procedure. Your doctor may cut away excess bone, reposition prominent bones, or even fuse joints. Sometimes tendons or ligaments are cut to reduce tension on a bone or joint. Your doctor will talk with you about the procedure that is best suited to your needs.  Date Last Reviewed: 7/1/2016 2000-2019 [employer]. [address] [place] [address]. All rights reserved. This information is not intended as a substitute for professional medical care. Always follow your healthcare professional's instructions.           Patient Education     Low Blood Pressure (All Causes)  A blood pressure reading is made up of 2 numbers There is a top number over a bottom number. The top number is the systolic pressure. The bottom number is the diastolic pressure. A normal blood pressure is a systolic pressure less than 120 over a diastolic pressure less than 80. Low blood pressure (hypotension) is a blood pressure that is less than what is normal for you. Low blood pressure can cause dizziness, lightheadedness, or fainting.  Some medicines can cause low blood pressure. They include:    High blood pressure pills    Water pills (diuretics)    Some heart  medicines    Some antidepressants    Pain, anxiety, sedative, and sleeping medicines  Other causes include:    Dehydration, severe infection, or fever    Blood loss, such as bleeding from the stomach or intestines.    Heart failure    Change in heart rate or rhythm (arrhythmia)    A drop in blood pressure from a sudden change in body position, from lying down to standing (orthostatic hypotension)    Alcohol or drug intoxication    Neurological diseases that impair the autonomic nervous system  Treatment will depend on what is causing your low blood pressure.  Home care  Follow these guidelines when caring for yourself at home:    Rest until your symptoms get better.    Keep a record of your symptoms and what you were doing when they occurred. Bring the record with you to your next appointment.    Be aware of how quickly your blood pressure drops when you become dehydrated, spend a lot of time in the sun, or have low blood sugar. Take measures to prevent blood pressure drops at these times.    Follow the treatment plan described by your healthcare provider.  Follow-up care  Follow up with your healthcare provider, or as advised.  When to seek medical advice  Call your healthcare provider right away if any of these occur:    Dizziness, lightheadedness, or fainting    Black or red color in your stools or vomit    Shortness of breath or difficulty breathing    Chest, shoulder, arm, neck, or upper back pain    Abdominal pain    Diarrhea or vomiting that doesn t go away    You aren t able to eat or drink    Fever of 100.4 F (38 C) or higher, or as directed by your healthcare provider    Burning when you urinate    Foul-smelling urine  Date Last Reviewed: 12/1/2016 2000-2019 [employer]. [address] [place] [address], [place] [address]. All rights reserved. This information is not intended as a substitute for professional medical care. Always follow your healthcare professional's instructions.           Patient  Education     Treating Warts     You and your healthcare provider can discuss whether your warts need to be treated.     You and your healthcare provider can talk about what treatment may be best for your wart or warts. To get rid of your warts, your healthcare provider may need to try more than one type of treatment. The methods described below are often used to treat warts.  Types of treatment    Do nothing. Most warts will resolve within 2 years, even without treatment. So doing nothing is sometimes a good option. This is particularly true for smaller warts that are not causing symptoms.    Cryotherapy (liquid nitrogen). This kills skin cells by freezing them. It kills the warts and destroys skin infected by the wart-causing virus. This is done in your healthcare provider s office and will cause some discomfort. It may take several treatments over several weeks to get rid of the warts.    Topical medicines. Prescribed topical medicines can be put on the skin. These are usually applied in the healthcare provider's office. But some prescriptions may be applied at home.    Over-the-counter (OTC) topical treatments. OTC medicines that most often contain salicylic acid may be an option. These patches, liquids, and creams are used at home. The medicine is applied daily to the wart and nearby skin. It's usually left on overnight. The dead skin is filed down the next day. In 1 to 3 days, the procedure can be repeated. Topical treatments are sometimes combined with cryotherapy.    Electrodessication and curettage (ED & C).  For this procedure, the healthcare provider applies numbing medicine to the wart. Then the wart is scraped or cut off. This type of treatment is usually not the first line of therapy.    Laser surgery.  This can vaporize wart tissue or destroy the blood vessels that feed the wart. This is done in the healthcare provider's office.    Shots (injections). These can be used to treat warts that don t  respond to other treatments, such as stubborn or painful warts around the nails. This is done in the healthcare provider s office.    When to seek medical treatment  It s a good idea to have your healthcare provider check your warts. That way your provider can rule out any other skin problems. Sometimes a callous or a corn can look like a wart, but the treatments may differ. Treatment can also provide relief from warts that bleed, burn, hurt, or itch. Genital warts should always be treated. They can spread to other people through sexual contact. And they may cause genital or cervical cancer.  After having your warts treated, new warts may still appear. Don t be discouraged. Warts often come back. See your healthcare provider again to discuss this. Your provider can tell you about the treatments that most likely will help clear your skin of warts.  Date Last Reviewed: 2/1/2017 2000-2019 [employer]. [address] [place] [address], [place] [address]. All rights reserved. This information is not intended as a substitute for professional medical care. Always follow your healthcare professional's instructions.

## 2020-08-12 NOTE — PROGRESS NOTES
SUBJECTIVE   Doreen Gramajo is a  female who presents to clinic today for the following health issue(s):       Foot problem/pain    Duration: 1-2 months    Description  Location: Lt outer upper ?wart    Intensity:  mild    Accompanying signs and symptoms: Raised hardened lump    History  Previous similar problem: no   Previous evaluation:  none    Precipitating or alleviating factors:  Trauma or overuse: no   Aggravating factors include: None    Therapies tried and outcome: Corn remover and paring down    Low BP     Duration: 1 week     Description (location/character/radiation): 87/56    Intensity:  Asymptomatic     History (similar episodes/previous evaluation): None    Precipitating or alleviating factors: None    Therapies tried and outcome: None   Clozapine, Pristiq, Haldol, Lithium, Lorazapam, Trazodone,  Drinks 3 glasses of water, has urinary incontinence so doesn't want to do too much  BP Readings from Last 6 Encounters:   08/12/20 (!) 88/58   12/03/19 96/64   07/30/19 118/66   12/11/18 106/71   10/09/18 109/71   08/28/18 96/69     Psychiatrist @ [place] (pt sees every 2 months)- Dr Schoenecker  Fax # [phone], Phone # [phone]  Private practice in [place]  7/25/19    Iron deficiency d/t diet concerns  She is eating healthy now  We'll trial no iron supplement and repeat anemia studies in 3 months    Health Maintenance        Health Maintenance Due   Topic Date Due     ASTHMA ACTION PLAN  07/24/2019     PHQ-2  01/01/2020     ASTHMA CONTROL TEST  01/30/2020     MEDICARE ANNUAL WELLNESS VISIT  07/30/2020     MAMMO SCREENING  08/13/2020       PCP   Josey Bonilla, ABIGAIL [phone]    PROBLEM LIST        Patient Active Problem List   Diagnosis     Schizoaffective disorder, depressive type (H)     Hypothyroidism     Bipolar affective (H)     Gastroesophageal reflux disease without esophagitis     CARDIOVASCULAR SCREENING; LDL GOAL LESS THAN 160     Health Care Home     Psychosis (H)      Behavior disturbance     Cigarette nicotine dependence with nicotine-induced disorder     Iron deficiency d/t anorexia     Chronic constipation     Urinary incontinence, unspecified type     Borderline intellectual functioning     Rectal prolapse     Extrapyramidal symptom     H/O Anorexia     Disturbance of conduct     Lives in group home     Dry eyes     Reactive airway disease without complication     Headache associated with hormonal factors     ROBYN (generalized anxiety disorder)     Involuntary commitment     Paranoid schizophrenia, chronic condition with acute exacerbation (H)     Constipation, unspecified       MEDICATIONS        Current Outpatient Medications   Medication     albuterol (VENTOLIN HFA) 108 (90 Base) MCG/ACT inhaler     CERTAVITE/ANTIOXIDANTS tablet     cloZAPine (CLOZARIL) 200 MG tablet     desvenlafaxine (PRISTIQ) 100 MG 24 hr tablet     diphenhydrAMINE (BENADRYL) 50 MG capsule     haloperidol decanoate (HALDOL DECANOATE) 100 MG/ML injection     HALOPERIDOL PO     HALOPERIDOL PO     levothyroxine (SYNTHROID/LEVOTHROID) 75 MCG tablet     lithium 300 MG capsule     LORazepam (ATIVAN) 1 MG tablet     pantoprazole (PROTONIX) 20 MG EC tablet     polyethylene glycol (GAVILAX) powder     salicylic acid (DUOFILM) 17 % external solution     sennosides (SENOKOT) 8.6 MG tablet     solifenacin (VESICARE) 5 MG tablet     TRAZODONE HCL PO     vitamin (B COMPLEX-C) tablet     aspirin-acetaminophen-caffeine (EXCEDRIN MIGRAINE) 250-250-65 MG tablet     bisacodyl (DULCOLAX) 10 MG suppository     Incontinence Supply Disposable (DEPEND UNDERGARMENTS) MISC     order for DME     order for DME     order for DME     order for DME     vitamin D3 (CHOLECALCIFEROL) 2000 units (50 mcg) tablet     No current facility-administered medications for this visit.        Reviewed and updated as needed this visit by Provider:  Tobacco  Allergies  Meds  Med Hx  Surg Hx  Fam Hx  Soc Hx     ROS      Constitutional, neuro,  "ENT, endocrine, pulmonary, cardiac, gastrointestinal, genitourinary, musculoskeletal, integument and psychiatric systems are negative, except as otherwise noted.    PHYSICAL EXAM   BP (!) 88/58   Pulse 94   Temp 97.6  F (36.4  C) (Tympanic)   Resp 20   Ht 1.575 m (5' 2\")   Wt 57.6 kg (127 lb)   SpO2 99%   BMI 23.23 kg/m    Body mass index is 23.23 kg/m .  GENERAL APPEARANCE: healthy, alert and no distress  NECK: no adenopathy, no asymmetry, masses, or scars and thyroid normal to palpation  RESP: lungs clear to auscultation - no rales, rhonchi or wheezes  CV: regular rates and rhythm, normal S1 S2, no S3 or S4 and no murmur, click or rub  ABDOMEN: soft, nontender, without hepatosplenomegaly or masses and bowel sounds normal  MS: extremities normal- no gross deformities noted  SKIN: common wart and callus on left outer base of pinkie toe   PSYCH: mentation appears abnormal- slow. and affect flat, speech is not as clear as normal    EKG- NSR        ASSESSMENT & PLAN   Group home page signed    1. Schizoaffective disorder, depressive type (H)  Chronic, stable  Follow with Dr. Schoenecker   - EKG 12-lead complete w/read - Clinics    2. Bipolar affective disorder, remission status unspecified (H)  Chronic, stable  Follow with Dr. Schoenecker     3. Schizoaffective disorder, unspecified type (H)  Chronic, stable  Follow with Dr. Schoenecker     4. ROBYN (generalized anxiety disorder)  Chronic, stable  Follow with Dr. Schoenecker     5. Iron deficiency d/t anorexia  Historical  STOP iron supplement  Recheck anemia labs in 3 months with a lab only appointment  - CBC with platelets; Future  - Ferritin; Future  - Iron and iron binding capacity; Future  - Vitamin B12; Future  - Folate; Future    6. H/O Anorexia  Historical    7. Corn or callus  Acute, stable  Use shoes with wide toe box  Pare down callus regularly    8. Common wart  Acute, stable  Use OTC Duo film as directed on package  -salicylic acid (DUOFILM) 17 % " external solution; Apply topically 2 times dailyup in 1-2 month if no improvement    9. Hypotension, unspecified hypotension type  Acute, stable  - EKG 12-lead complete w/read - Clinics  Follow with Dr. Schoenecker and discuss: Clozapine, Haldol, Lorazapam, and Trazodone (known side effect of these is hypotension)          Patient Education     Treating Corns and Calluses  If your corns or calluses are mild, reducing friction may help. Different shoes, moleskin patches, or soft pads may be all the treatment you need. In more severe cases, treating tissue buildup may require your doctor s care. Sometimes custom-made shoe inserts (orthotics) or special pads are prescribed to reduce friction and pressure.     Doctor examining senior man's foot.   Change shoes  If you have corns, your doctor may suggest wearing shoes that have more toe room. This way, buckled joints are less likely to be pinched against the top of the shoe. If you have calluses, wearing a cushioned insole, arch support, or heel counter can help reduce friction.  Visit your doctor  In some cases, your doctor may trim away the outer layers of skin that make up the corn or callus. For a painful corn, medicine may be injected beneath the built-up tissue.  Wear orthotics  Orthotics are specially made to meet the needs of your feet. They cushion calluses or divert pressure away from these problem areas. Worn as directed, orthotics help limit existing problems and prevent new ones from forming.  If you need surgery  If a bone or joint is out of place, certain parts of your foot may be under too much pressure. This can cause severe corns and calluses. In such cases, surgery may be the best way to correct the problem.  Outpatient procedures  In most cases, surgery to improve bone position is an outpatient procedure. Your doctor may cut away excess bone, reposition prominent bones, or even fuse joints. Sometimes tendons or ligaments are cut to reduce tension on a  bone or joint. Your doctor will talk with you about the procedure that is best suited to your needs.  Date Last Reviewed: 7/1/2016 2000-2019 [employer]. [address] [place] [address], [place] [address]. All rights reserved. This information is not intended as a substitute for professional medical care. Always follow your healthcare professional's instructions.           Patient Education     Low Blood Pressure (All Causes)  A blood pressure reading is made up of 2 numbers There is a top number over a bottom number. The top number is the systolic pressure. The bottom number is the diastolic pressure. A normal blood pressure is a systolic pressure less than 120 over a diastolic pressure less than 80. Low blood pressure (hypotension) is a blood pressure that is less than what is normal for you. Low blood pressure can cause dizziness, lightheadedness, or fainting.  Some medicines can cause low blood pressure. They include:    High blood pressure pills    Water pills (diuretics)    Some heart medicines    Some antidepressants    Pain, anxiety, sedative, and sleeping medicines  Other causes include:    Dehydration, severe infection, or fever    Blood loss, such as bleeding from the stomach or intestines.    Heart failure    Change in heart rate or rhythm (arrhythmia)    A drop in blood pressure from a sudden change in body position, from lying down to standing (orthostatic hypotension)    Alcohol or drug intoxication    Neurological diseases that impair the autonomic nervous system  Treatment will depend on what is causing your low blood pressure.  Home care  Follow these guidelines when caring for yourself at home:    Rest until your symptoms get better.    Keep a record of your symptoms and what you were doing when they occurred. Bring the record with you to your next appointment.    Be aware of how quickly your blood pressure drops when you become dehydrated, spend a lot of time in the sun, or have low blood  sugar. Take measures to prevent blood pressure drops at these times.    Follow the treatment plan described by your healthcare provider.  Follow-up care  Follow up with your healthcare provider, or as advised.  When to seek medical advice  Call your healthcare provider right away if any of these occur:    Dizziness, lightheadedness, or fainting    Black or red color in your stools or vomit    Shortness of breath or difficulty breathing    Chest, shoulder, arm, neck, or upper back pain    Abdominal pain    Diarrhea or vomiting that doesn t go away    You aren t able to eat or drink    Fever of 100.4 F (38 C) or higher, or as directed by your healthcare provider    Burning when you urinate    Foul-smelling urine  Date Last Reviewed: 12/1/2016 2000-2019 [employer]. [address] [place] [address], [place] [address]. All rights reserved. This information is not intended as a substitute for professional medical care. Always follow your healthcare professional's instructions.           Patient Education     Treating Warts     You and your healthcare provider can discuss whether your warts need to be treated.     You and your healthcare provider can talk about what treatment may be best for your wart or warts. To get rid of your warts, your healthcare provider may need to try more than one type of treatment. The methods described below are often used to treat warts.  Types of treatment    Do nothing. Most warts will resolve within 2 years, even without treatment. So doing nothing is sometimes a good option. This is particularly true for smaller warts that are not causing symptoms.    Cryotherapy (liquid nitrogen). This kills skin cells by freezing them. It kills the warts and destroys skin infected by the wart-causing virus. This is done in your healthcare provider s office and will cause some discomfort. It may take several treatments over several weeks to get rid of the warts.    Topical medicines. Prescribed  topical medicines can be put on the skin. These are usually applied in the healthcare provider's office. But some prescriptions may be applied at home.    Over-the-counter (OTC) topical treatments. OTC medicines that most often contain salicylic acid may be an option. These patches, liquids, and creams are used at home. The medicine is applied daily to the wart and nearby skin. It's usually left on overnight. The dead skin is filed down the next day. In 1 to 3 days, the procedure can be repeated. Topical treatments are sometimes combined with cryotherapy.    Electrodessication and curettage (ED & C).  For this procedure, the healthcare provider applies numbing medicine to the wart. Then the wart is scraped or cut off. This type of treatment is usually not the first line of therapy.    Laser surgery.  This can vaporize wart tissue or destroy the blood vessels that feed the wart. This is done in the healthcare provider's office.    Shots (injections). These can be used to treat warts that don t respond to other treatments, such as stubborn or painful warts around the nails. This is done in the healthcare provider s office.    When to seek medical treatment  It s a good idea to have your healthcare provider check your warts. That way your provider can rule out any other skin problems. Sometimes a callous or a corn can look like a wart, but the treatments may differ. Treatment can also provide relief from warts that bleed, burn, hurt, or itch. Genital warts should always be treated. They can spread to other people through sexual contact. And they may cause genital or cervical cancer.  After having your warts treated, new warts may still appear. Don t be discouraged. Warts often come back. See your healthcare provider again to discuss this. Your provider can tell you about the treatments that most likely will help clear your skin of warts.  Date Last Reviewed: 2/1/2017 2000-2019 [employer]. [address] [place] [address]  "[place] [address]. All rights reserved. This information is not intended as a substitute for professional medical care. Always follow your healthcare professional's instructions.             Risks, benefits, side effects and rationale for treatment plan fully discussed with the patient and understanding expressed.  Josey Bonilla [facility]    Answers for HPI/ROS submitted by the patient on 8/12/2020   Annual Exam:  In general, how would you rate your overall physical health?: good  Frequency of exercise:: 2-3 days/week  Do you usually eat at least 4 servings of fruit and vegetables a day, include whole grains & fiber, and avoid regularly eating high fat or \"junk\" foods? : Yes  Taking medications regularly:: Yes  Medication side effects:: Other  Activities of Daily Living: transportation requires assistance, shopping requires assistance, preparing meals requires assistance, laundry requires assistance, medication administration requires assistance  Home safety: no safety concerns identified  Hearing Impairment:: no hearing concerns  In the past 6 months, have you been bothered by leaking of urine?: Yes  abdominal pain: No  Blood in stool: No  Blood in urine: No  chest pain: No  chills: No  congestion: No  constipation: No  cough: No  diarrhea: No  dizziness: Yes  ear pain: No  eye pain: No  nervous/anxious: Yes  fever: No  frequency: No  genital sores: No  headaches: No  hearing loss: No  heartburn: No  arthralgias: No  joint swelling: No  peripheral edema: No  mood changes: No  myalgias: No  nausea: No  dysuria: No  palpitations: No  Skin sensation changes: No  sore throat: No  urgency: No  rash: No  shortness of breath: No  visual disturbance: No  weakness: No  pelvic pain: No  vaginal bleeding: No  vaginal discharge: No  tenderness: No  breast mass: No  breast discharge: No  In general, how would you rate your overall mental or emotional health?: fair  Additional " concerns today:: No  Duration of exercise:: 15-30 minutes

## 2020-08-13 ASSESSMENT — ASTHMA QUESTIONNAIRES: ACT_TOTALSCORE: 25

## 2020-08-14 ENCOUNTER — MEDICAL CORRESPONDENCE (OUTPATIENT)
Dept: HEALTH INFORMATION MANAGEMENT | Facility: CLINIC | Age: 50
End: 2020-08-14

## 2020-09-21 DIAGNOSIS — F25.9 SCHIZOAFFECTIVE DISORDER, UNSPECIFIED TYPE (H): Chronic | ICD-10-CM

## 2020-09-21 DIAGNOSIS — R32 URINARY INCONTINENCE, UNSPECIFIED TYPE: ICD-10-CM

## 2020-09-21 DIAGNOSIS — K21.9 GASTROESOPHAGEAL REFLUX DISEASE WITHOUT ESOPHAGITIS: ICD-10-CM

## 2020-09-22 RX ORDER — FOLIC ACID/MV,IRON,MIN/LUTEIN 0.4-18-25
TABLET ORAL
Qty: 30 TABLET | Refills: 11 | Status: SHIPPED | OUTPATIENT
Start: 2020-09-22

## 2020-09-22 RX ORDER — PANTOPRAZOLE SODIUM 20 MG/1
TABLET, DELAYED RELEASE ORAL
Qty: 30 TABLET | Refills: 11 | Status: SHIPPED | OUTPATIENT
Start: 2020-09-22

## 2020-09-22 RX ORDER — SOLIFENACIN SUCCINATE 5 MG/1
TABLET, FILM COATED ORAL
Qty: 30 TABLET | Refills: 11 | Status: SHIPPED | OUTPATIENT
Start: 2020-09-22

## 2020-10-21 DIAGNOSIS — K59.09 CHRONIC CONSTIPATION: ICD-10-CM

## 2020-10-21 DIAGNOSIS — E03.9 HYPOTHYROIDISM, UNSPECIFIED TYPE: ICD-10-CM

## 2020-10-21 RX ORDER — LEVOTHYROXINE SODIUM 75 UG/1
TABLET ORAL
Qty: 30 TABLET | Refills: 3 | Status: SHIPPED | OUTPATIENT
Start: 2020-10-21

## 2020-10-21 RX ORDER — SENNOSIDES 8.6 MG
TABLET ORAL
Qty: 180 TABLET | Refills: 1 | Status: SHIPPED | OUTPATIENT
Start: 2020-10-21

## 2023-04-24 NOTE — RESULT ENCOUNTER NOTE
TSH( thyroid)- normal  Lipids- stable  Please call her with these results. Send a hard copy for their records.   Thanks. RENÉ Nowak     /67, HR 80/yes

## 2024-07-11 NOTE — PROGRESS NOTES
Quick Note:    PFT was unable to be fully completed. No restriction noted. I'm going to refer her to allergy/asthma for review of asthma. Please schedule appointment: [place] [phone]  Please send her a copy of lab/test results.   Thanks. Josey Bonilla, RENÉ      ______   What Type Of Note Output Would You Prefer (Optional)?: Bullet Format Hpi Title: Evaluation of Skin Lesions Location: Back Year Removed: 2010